# Patient Record
Sex: MALE | Race: WHITE | HISPANIC OR LATINO
[De-identification: names, ages, dates, MRNs, and addresses within clinical notes are randomized per-mention and may not be internally consistent; named-entity substitution may affect disease eponyms.]

---

## 2017-01-08 ENCOUNTER — MESSAGE (OUTPATIENT)
Age: 52
End: 2017-01-08

## 2017-01-11 ENCOUNTER — APPOINTMENT (OUTPATIENT)
Dept: PULMONOLOGY | Facility: CLINIC | Age: 52
End: 2017-01-11

## 2017-06-18 ENCOUNTER — EMERGENCY (EMERGENCY)
Facility: HOSPITAL | Age: 52
LOS: 1 days | Discharge: HOME | End: 2017-06-18
Attending: EMERGENCY MEDICINE
Payer: COMMERCIAL

## 2017-06-18 VITALS
DIASTOLIC BLOOD PRESSURE: 80 MMHG | SYSTOLIC BLOOD PRESSURE: 126 MMHG | OXYGEN SATURATION: 100 % | HEART RATE: 109 BPM | HEIGHT: 69 IN | WEIGHT: 214.95 LBS | TEMPERATURE: 99 F | RESPIRATION RATE: 24 BRPM

## 2017-06-18 VITALS
TEMPERATURE: 99 F | SYSTOLIC BLOOD PRESSURE: 130 MMHG | OXYGEN SATURATION: 97 % | RESPIRATION RATE: 20 BRPM | HEART RATE: 95 BPM | DIASTOLIC BLOOD PRESSURE: 83 MMHG

## 2017-06-18 DIAGNOSIS — G43.909 MIGRAINE, UNSPECIFIED, NOT INTRACTABLE, WITHOUT STATUS MIGRAINOSUS: ICD-10-CM

## 2017-06-18 DIAGNOSIS — M25.551 PAIN IN RIGHT HIP: ICD-10-CM

## 2017-06-18 DIAGNOSIS — M54.31 SCIATICA, RIGHT SIDE: ICD-10-CM

## 2017-06-18 DIAGNOSIS — E11.9 TYPE 2 DIABETES MELLITUS WITHOUT COMPLICATIONS: ICD-10-CM

## 2017-06-18 DIAGNOSIS — I10 ESSENTIAL (PRIMARY) HYPERTENSION: ICD-10-CM

## 2017-06-18 DIAGNOSIS — F17.210 NICOTINE DEPENDENCE, CIGARETTES, UNCOMPLICATED: ICD-10-CM

## 2017-06-18 PROCEDURE — 73502 X-RAY EXAM HIP UNI 2-3 VIEWS: CPT

## 2017-06-18 PROCEDURE — 99283 EMERGENCY DEPT VISIT LOW MDM: CPT | Mod: 25

## 2017-06-18 PROCEDURE — 96372 THER/PROPH/DIAG INJ SC/IM: CPT

## 2017-06-18 PROCEDURE — 99284 EMERGENCY DEPT VISIT MOD MDM: CPT

## 2017-06-18 PROCEDURE — 73502 X-RAY EXAM HIP UNI 2-3 VIEWS: CPT | Mod: 26,RT

## 2017-06-18 RX ORDER — ACETAMINOPHEN WITH CODEINE 300MG-30MG
1 TABLET ORAL
Qty: 20 | Refills: 0 | OUTPATIENT
Start: 2017-06-18 | End: 2017-06-23

## 2017-06-18 RX ORDER — KETOROLAC TROMETHAMINE 30 MG/ML
30 SYRINGE (ML) INJECTION ONCE
Qty: 0 | Refills: 0 | Status: DISCONTINUED | OUTPATIENT
Start: 2017-06-18 | End: 2017-06-18

## 2017-06-18 RX ORDER — ACETAMINOPHEN WITH CODEINE 300MG-30MG
1 TABLET ORAL ONCE
Qty: 0 | Refills: 0 | Status: DISCONTINUED | OUTPATIENT
Start: 2017-06-18 | End: 2017-06-18

## 2017-06-18 RX ORDER — IBUPROFEN 200 MG
1 TABLET ORAL
Qty: 20 | Refills: 0 | OUTPATIENT
Start: 2017-06-18 | End: 2017-06-23

## 2017-06-18 RX ADMIN — Medication 30 MILLIGRAM(S): at 16:20

## 2017-06-18 RX ADMIN — Medication 1 TABLET(S): at 16:19

## 2017-06-18 NOTE — ED PROVIDER NOTE - PROGRESS NOTE DETAILS
Pain is c/w patient's chronic R sided hip pain/sciatica. DC with cane  and pain meds, given copy of xray.

## 2017-06-18 NOTE — ED PROVIDER NOTE - OBJECTIVE STATEMENT
52 y/o male with significant PMHx NIDDM, HTN, presents to the ED c/o R hip pain radiating to R knee x today. Pt describes pain as aching and intermittent in nature, worsened with walking, causing limp. Pt reports he took Ibuprofen to mild relief. Pt denies heavy lifting, trauma or falls recently, only relates taking out garbage.  Pt denies swelling, dysuria, back pain, saddle anesthesia, numbness, weakness, tingling, N/V/D, Hx of abd surgeries, or any other complaints. NKDA Currently taking: Metformin. Smoker. 50 y/o male with significant PMHx NIDDM, HTN, presents to the ED c/o R abd/hip pain radiating to R knee x today. Pt describes pain as aching and intermittent in nature, worsened with walking, causing limp. Pt reports he took Ibuprofen to mild relief. Pt denies heavy lifting, trauma or falls recently, only relates taking out garbage.  Pt denies swelling, dysuria, back pain, saddle anesthesia, numbness, weakness, tingling, N/V/D, Hx of abd surgeries, or any other complaints. NKDA Currently taking: Metformin. Smoker. 50 y/o male with significant PMHx NIDDM, HTN, presents to the ED c/o R abd, R hip pain radiating to R knee x today. Pt describes pain as aching and intermittent in nature, worsened with walking, causing limp. Pt reports he took Ibuprofen to mild relief. Pt denies heavy lifting, trauma or falls recently, only relates taking out garbage.  Pt denies swelling, dysuria, back pain, saddle anesthesia, numbness, weakness, tingling, N/V/D, Hx of abd surgeries, or any other complaints. NKDA Currently taking: Metformin. Smoker. 50 y/o male with significant PMHx NIDDM, HTN, presents to the ED c/o R abd, R hip pain radiating to R knee x today. Pt describes pain as aching and intermittent in nature, worsened with walking, causing limp. Pt reports he took Ibuprofen to mild relief. Pt denies heavy lifting, trauma or falls recently, only relates taking out garbage. Pt has a known Hx of R sided sciatica and R hip pain. Pt denies swelling, dysuria, back pain, saddle anesthesia, numbness, weakness, tingling, N/V/D, Hx of abd surgeries, or any other complaints. NKDA Currently taking: Metformin. Smoker. 52 y/o male with significant PMHx NIDDM, HTN, R sided sciatica presents to the ED c/o R hip pain radiating to R knee x today. Pt describes pain as aching and intermittent in nature, worsened with walking, causing limp. Pt reports he took Ibuprofen to mild relief. Pt denies heavy lifting, trauma or falls recently, only relates taking out garbage. Pt has a known Hx of R sided sciatica and R hip pain. Pt denies swelling, dysuria, back pain, saddle anesthesia, numbness, weakness, tingling, N/V/D, Hx of abd surgeries, or any other complaints. NKDA Currently taking: Metformin. Smoker.

## 2017-06-18 NOTE — ED PROVIDER NOTE - CHPI ED SYMPTOMS NEG
no numbness/no tingling/no weakness/no swelling, dysuria, back pain, saddle anesthesia, numbness, weakness, tingling, N/V/D

## 2017-06-18 NOTE — ED PROVIDER NOTE - EXTREMITY EXAM
FROM of knees, pain on range of motion of R knee right lower extremity findings/FROM of knees, pain on range of motion of R hip

## 2017-06-18 NOTE — ED PROVIDER NOTE - MEDICAL DECISION MAKING DETAILS
Patient with known R sided sciatica c/o R hip pain radiating to the R knee, no new trauma, on exam with pain on ROM of the R hip but no redness, swelling, TTP. No leg swelling/pain. NV intact, will xray R hip, treat with pain meds, likely exacerbation of R hip sciatica Patient with known R sided sciatica c/o R hip pain radiating to the R knee, no new trauma, on exam with pain on ROM of the R hip but no redness, swelling, TTP. No leg swelling/pain. NV intact, no numbness/tingling/incontinence, will xray R hip, treat with pain meds, likely exacerbation of R hip sciatica.

## 2017-09-25 ENCOUNTER — OUTPATIENT (OUTPATIENT)
Dept: OUTPATIENT SERVICES | Facility: HOSPITAL | Age: 52
LOS: 1 days | Discharge: HOME | End: 2017-09-25
Payer: MEDICARE

## 2017-09-25 VITALS
HEART RATE: 74 BPM | HEIGHT: 66 IN | DIASTOLIC BLOOD PRESSURE: 81 MMHG | OXYGEN SATURATION: 99 % | WEIGHT: 220.24 LBS | RESPIRATION RATE: 18 BRPM | TEMPERATURE: 98 F | SYSTOLIC BLOOD PRESSURE: 144 MMHG

## 2017-09-25 DIAGNOSIS — I10 ESSENTIAL (PRIMARY) HYPERTENSION: ICD-10-CM

## 2017-09-25 DIAGNOSIS — M25.561 PAIN IN RIGHT KNEE: ICD-10-CM

## 2017-09-25 DIAGNOSIS — S83.221D PERIPHERAL TEAR OF MEDIAL MENISCUS, CURRENT INJURY, RIGHT KNEE, SUBSEQUENT ENCOUNTER: ICD-10-CM

## 2017-09-25 DIAGNOSIS — F17.200 NICOTINE DEPENDENCE, UNSPECIFIED, UNCOMPLICATED: ICD-10-CM

## 2017-09-25 DIAGNOSIS — E11.9 TYPE 2 DIABETES MELLITUS WITHOUT COMPLICATIONS: ICD-10-CM

## 2017-09-25 DIAGNOSIS — Z01.818 ENCOUNTER FOR OTHER PREPROCEDURAL EXAMINATION: ICD-10-CM

## 2017-09-25 LAB
ALBUMIN SERPL ELPH-MCNC: 3.7 G/DL — SIGNIFICANT CHANGE UP (ref 3.3–5)
ALP SERPL-CCNC: 100 U/L — SIGNIFICANT CHANGE UP (ref 40–120)
ALT FLD-CCNC: 36 U/L — SIGNIFICANT CHANGE UP (ref 12–78)
ANION GAP SERPL CALC-SCNC: 8 MMOL/L — SIGNIFICANT CHANGE UP (ref 5–17)
AST SERPL-CCNC: 17 U/L — SIGNIFICANT CHANGE UP (ref 15–37)
BILIRUB SERPL-MCNC: 0.3 MG/DL — SIGNIFICANT CHANGE UP (ref 0.2–1.2)
BUN SERPL-MCNC: 8 MG/DL — SIGNIFICANT CHANGE UP (ref 7–23)
CALCIUM SERPL-MCNC: 8.9 MG/DL — SIGNIFICANT CHANGE UP (ref 8.5–10.1)
CHLORIDE SERPL-SCNC: 104 MMOL/L — SIGNIFICANT CHANGE UP (ref 96–108)
CO2 SERPL-SCNC: 30 MMOL/L — SIGNIFICANT CHANGE UP (ref 22–31)
CREAT SERPL-MCNC: 0.74 MG/DL — SIGNIFICANT CHANGE UP (ref 0.5–1.3)
GLUCOSE SERPL-MCNC: 137 MG/DL — HIGH (ref 70–99)
HCT VFR BLD CALC: 43.3 % — SIGNIFICANT CHANGE UP (ref 39–50)
HGB BLD-MCNC: 14.8 G/DL — SIGNIFICANT CHANGE UP (ref 13–17)
MCHC RBC-ENTMCNC: 31.1 PG — SIGNIFICANT CHANGE UP (ref 27–34)
MCHC RBC-ENTMCNC: 34.2 GM/DL — SIGNIFICANT CHANGE UP (ref 32–36)
MCV RBC AUTO: 91 FL — SIGNIFICANT CHANGE UP (ref 80–100)
PLATELET # BLD AUTO: 189 K/UL — SIGNIFICANT CHANGE UP (ref 150–400)
POTASSIUM SERPL-MCNC: 3.2 MMOL/L — LOW (ref 3.5–5.3)
POTASSIUM SERPL-SCNC: 3.2 MMOL/L — LOW (ref 3.5–5.3)
PROT SERPL-MCNC: 7.7 GM/DL — SIGNIFICANT CHANGE UP (ref 6–8.3)
RBC # BLD: 4.76 M/UL — SIGNIFICANT CHANGE UP (ref 4.2–5.8)
RBC # FLD: 12.4 % — SIGNIFICANT CHANGE UP (ref 11–15)
SODIUM SERPL-SCNC: 142 MMOL/L — SIGNIFICANT CHANGE UP (ref 135–145)
WBC # BLD: 8.6 K/UL — SIGNIFICANT CHANGE UP (ref 3.8–10.5)
WBC # FLD AUTO: 8.6 K/UL — SIGNIFICANT CHANGE UP (ref 3.8–10.5)

## 2017-09-25 PROCEDURE — 71020: CPT | Mod: 26

## 2017-09-25 NOTE — H&P PST ADULT - FAMILY HISTORY
Grandparent  Still living? Unknown  Family history of essential hypertension, Age at diagnosis: Age Unknown

## 2017-09-25 NOTE — H&P PST ADULT - RS GEN PE MLT RESP DETAILS PC
no chest wall tenderness/no rhonchi/no wheezes/no rales/respirations non-labored/clear to auscultation bilaterally

## 2017-09-25 NOTE — H&P PST ADULT - HISTORY OF PRESENT ILLNESS
Patient is a 52 year old male with PMH DM, HTN, current everyday smoker who presents prior to right knee arthroscopy due to knee pain. Began suddenly in June of this year, tried PT with no relief. Takes ibuprofen at home with minimal relief. Pain worsened with laying supine and sitting for extended periods of time.     Patient saw his PCP about 1-2 weeks ago; he is aware of surgery. Patient is a 52 year old male with PMH DM, HTN, current everyday smoker who presents prior to right knee arthroscopy due to knee pain. States that pain began suddenly in June of this year, tried PT with no relief. Takes ibuprofen at home with minimal relief. Pain worsened when laying supine and sitting for extended periods of time.     Patient saw his PCP about 1-2 weeks ago; he is aware of surgery.

## 2017-10-30 ENCOUNTER — EMERGENCY (EMERGENCY)
Facility: HOSPITAL | Age: 52
LOS: 1 days | Discharge: LEFT BEFORE DISCHARGE | End: 2017-10-30
Admitting: EMERGENCY MEDICINE

## 2017-10-30 VITALS
SYSTOLIC BLOOD PRESSURE: 170 MMHG | DIASTOLIC BLOOD PRESSURE: 96 MMHG | RESPIRATION RATE: 18 BRPM | HEART RATE: 96 BPM | HEIGHT: 69 IN | WEIGHT: 218.92 LBS | OXYGEN SATURATION: 100 % | TEMPERATURE: 98 F

## 2017-10-30 NOTE — ED ADULT TRIAGE NOTE - CHIEF COMPLAINT QUOTE
patient with toothache on right side. hx of root canal on that tooth patient has not been able to see the dentist.

## 2017-10-30 NOTE — ED ADULT NURSE NOTE - OBJECTIVE STATEMENT
Pt is a 52 year old male who is complaining of pain in his right lower jaw and teeth. Pt reports the pain started last night suddenly. Pt reports he tried using Ibuprofen with no relief and decided to come to the ED.

## 2017-11-04 DIAGNOSIS — K08.89 OTHER SPECIFIED DISORDERS OF TEETH AND SUPPORTING STRUCTURES: ICD-10-CM

## 2018-01-08 ENCOUNTER — EMERGENCY (EMERGENCY)
Facility: HOSPITAL | Age: 53
LOS: 1 days | Discharge: HOME | End: 2018-01-08
Attending: EMERGENCY MEDICINE
Payer: COMMERCIAL

## 2018-01-08 VITALS
RESPIRATION RATE: 18 BRPM | TEMPERATURE: 99 F | DIASTOLIC BLOOD PRESSURE: 88 MMHG | HEIGHT: 69 IN | HEART RATE: 74 BPM | SYSTOLIC BLOOD PRESSURE: 141 MMHG | OXYGEN SATURATION: 98 % | WEIGHT: 218.92 LBS

## 2018-01-08 LAB
ALBUMIN SERPL ELPH-MCNC: 3.9 G/DL — SIGNIFICANT CHANGE UP (ref 3.5–5)
ALP SERPL-CCNC: 97 U/L — SIGNIFICANT CHANGE UP (ref 40–120)
ALT FLD-CCNC: 28 U/L DA — SIGNIFICANT CHANGE UP (ref 10–60)
ANION GAP SERPL CALC-SCNC: 8 MMOL/L — SIGNIFICANT CHANGE UP (ref 5–17)
APPEARANCE UR: CLEAR — SIGNIFICANT CHANGE UP
AST SERPL-CCNC: 15 U/L — SIGNIFICANT CHANGE UP (ref 10–40)
BACTERIA # UR AUTO: ABNORMAL /HPF
BASOPHILS # BLD AUTO: 0.1 K/UL — SIGNIFICANT CHANGE UP (ref 0–0.2)
BASOPHILS NFR BLD AUTO: 0.9 % — SIGNIFICANT CHANGE UP (ref 0–2)
BILIRUB SERPL-MCNC: 0.7 MG/DL — SIGNIFICANT CHANGE UP (ref 0.2–1.2)
BILIRUB UR-MCNC: ABNORMAL
BUN SERPL-MCNC: 12 MG/DL — SIGNIFICANT CHANGE UP (ref 7–18)
CALCIUM SERPL-MCNC: 8.9 MG/DL — SIGNIFICANT CHANGE UP (ref 8.4–10.5)
CHLORIDE SERPL-SCNC: 106 MMOL/L — SIGNIFICANT CHANGE UP (ref 96–108)
CO2 SERPL-SCNC: 24 MMOL/L — SIGNIFICANT CHANGE UP (ref 22–31)
COLOR SPEC: YELLOW — SIGNIFICANT CHANGE UP
COMMENT - URINE: SIGNIFICANT CHANGE UP
CREAT SERPL-MCNC: 0.8 MG/DL — SIGNIFICANT CHANGE UP (ref 0.5–1.3)
DIFF PNL FLD: ABNORMAL
EOSINOPHIL # BLD AUTO: 0.1 K/UL — SIGNIFICANT CHANGE UP (ref 0–0.5)
EOSINOPHIL NFR BLD AUTO: 0.7 % — SIGNIFICANT CHANGE UP (ref 0–6)
GLUCOSE SERPL-MCNC: 113 MG/DL — HIGH (ref 70–99)
GLUCOSE UR QL: NEGATIVE — SIGNIFICANT CHANGE UP
GRAN CASTS # UR COMP ASSIST: ABNORMAL /LPF
HCT VFR BLD CALC: 49.2 % — SIGNIFICANT CHANGE UP (ref 39–50)
HGB BLD-MCNC: 16.1 G/DL — SIGNIFICANT CHANGE UP (ref 13–17)
KETONES UR-MCNC: ABNORMAL
LEUKOCYTE ESTERASE UR-ACNC: ABNORMAL
LYMPHOCYTES # BLD AUTO: 2.7 K/UL — SIGNIFICANT CHANGE UP (ref 1–3.3)
LYMPHOCYTES # BLD AUTO: 30.5 % — SIGNIFICANT CHANGE UP (ref 13–44)
MCHC RBC-ENTMCNC: 30.5 PG — SIGNIFICANT CHANGE UP (ref 27–34)
MCHC RBC-ENTMCNC: 32.8 GM/DL — SIGNIFICANT CHANGE UP (ref 32–36)
MCV RBC AUTO: 92.9 FL — SIGNIFICANT CHANGE UP (ref 80–100)
MONOCYTES # BLD AUTO: 0.6 K/UL — SIGNIFICANT CHANGE UP (ref 0–0.9)
MONOCYTES NFR BLD AUTO: 6.7 % — SIGNIFICANT CHANGE UP (ref 2–14)
NEUTROPHILS # BLD AUTO: 5.4 K/UL — SIGNIFICANT CHANGE UP (ref 1.8–7.4)
NEUTROPHILS NFR BLD AUTO: 61.2 % — SIGNIFICANT CHANGE UP (ref 43–77)
NITRITE UR-MCNC: NEGATIVE — SIGNIFICANT CHANGE UP
PH UR: 6 — SIGNIFICANT CHANGE UP (ref 5–8)
PLATELET # BLD AUTO: 192 K/UL — SIGNIFICANT CHANGE UP (ref 150–400)
POTASSIUM SERPL-MCNC: 3.5 MMOL/L — SIGNIFICANT CHANGE UP (ref 3.5–5.3)
POTASSIUM SERPL-SCNC: 3.5 MMOL/L — SIGNIFICANT CHANGE UP (ref 3.5–5.3)
PROT SERPL-MCNC: 7.9 G/DL — SIGNIFICANT CHANGE UP (ref 6–8.3)
PROT UR-MCNC: 30 MG/DL
RBC # BLD: 5.29 M/UL — SIGNIFICANT CHANGE UP (ref 4.2–5.8)
RBC # FLD: 13.1 % — SIGNIFICANT CHANGE UP (ref 10.3–14.5)
RBC CASTS # UR COMP ASSIST: ABNORMAL /HPF (ref 0–2)
SODIUM SERPL-SCNC: 138 MMOL/L — SIGNIFICANT CHANGE UP (ref 135–145)
SP GR SPEC: 1.02 — SIGNIFICANT CHANGE UP (ref 1.01–1.02)
UROBILINOGEN FLD QL: 8
WBC # BLD: 8.8 K/UL — SIGNIFICANT CHANGE UP (ref 3.8–10.5)
WBC # FLD AUTO: 8.8 K/UL — SIGNIFICANT CHANGE UP (ref 3.8–10.5)
WBC UR QL: ABNORMAL /HPF (ref 0–5)

## 2018-01-08 PROCEDURE — 85027 COMPLETE CBC AUTOMATED: CPT

## 2018-01-08 PROCEDURE — 99284 EMERGENCY DEPT VISIT MOD MDM: CPT

## 2018-01-08 PROCEDURE — 74176 CT ABD & PELVIS W/O CONTRAST: CPT

## 2018-01-08 PROCEDURE — 80053 COMPREHEN METABOLIC PANEL: CPT

## 2018-01-08 PROCEDURE — 81001 URINALYSIS AUTO W/SCOPE: CPT

## 2018-01-08 PROCEDURE — 96374 THER/PROPH/DIAG INJ IV PUSH: CPT

## 2018-01-08 PROCEDURE — 71046 X-RAY EXAM CHEST 2 VIEWS: CPT | Mod: 26

## 2018-01-08 PROCEDURE — 71046 X-RAY EXAM CHEST 2 VIEWS: CPT

## 2018-01-08 PROCEDURE — 96375 TX/PRO/DX INJ NEW DRUG ADDON: CPT

## 2018-01-08 PROCEDURE — 74176 CT ABD & PELVIS W/O CONTRAST: CPT | Mod: 26

## 2018-01-08 PROCEDURE — 99284 EMERGENCY DEPT VISIT MOD MDM: CPT | Mod: 25

## 2018-01-08 RX ORDER — IBUPROFEN 200 MG
1 TABLET ORAL
Qty: 20 | Refills: 0 | OUTPATIENT
Start: 2018-01-08 | End: 2018-02-06

## 2018-01-08 RX ORDER — KETOROLAC TROMETHAMINE 30 MG/ML
15 SYRINGE (ML) INJECTION ONCE
Qty: 0 | Refills: 0 | Status: DISCONTINUED | OUTPATIENT
Start: 2018-01-08 | End: 2018-01-08

## 2018-01-08 RX ORDER — CEFTRIAXONE 500 MG/1
1 INJECTION, POWDER, FOR SOLUTION INTRAMUSCULAR; INTRAVENOUS ONCE
Qty: 0 | Refills: 0 | Status: COMPLETED | OUTPATIENT
Start: 2018-01-08 | End: 2018-01-08

## 2018-01-08 RX ORDER — SODIUM CHLORIDE 9 MG/ML
3 INJECTION INTRAMUSCULAR; INTRAVENOUS; SUBCUTANEOUS ONCE
Qty: 0 | Refills: 0 | Status: COMPLETED | OUTPATIENT
Start: 2018-01-08 | End: 2018-01-08

## 2018-01-08 RX ORDER — CEFUROXIME AXETIL 250 MG
1 TABLET ORAL
Qty: 20 | Refills: 0 | OUTPATIENT
Start: 2018-01-08 | End: 2018-01-17

## 2018-01-08 RX ADMIN — Medication 15 MILLIGRAM(S): at 12:39

## 2018-01-08 RX ADMIN — SODIUM CHLORIDE 3 MILLILITER(S): 9 INJECTION INTRAMUSCULAR; INTRAVENOUS; SUBCUTANEOUS at 12:39

## 2018-01-08 RX ADMIN — CEFTRIAXONE 100 GRAM(S): 500 INJECTION, POWDER, FOR SOLUTION INTRAMUSCULAR; INTRAVENOUS at 17:09

## 2018-01-08 NOTE — ED PROVIDER NOTE - OBJECTIVE STATEMENT
51 y/o M w/ Hx of HTN, kidney stones c/o L sided back pain x 4 hours w/ nausea. Pt denies fever, chills, cough, hematouria and any other complaints. NKDA. 53 y/o M w/ Hx of HTN, kidney stones c/o L sided back pain x 4 hours w/ nausea. Pt denies cp/sob/dizziness. No fever, chills, cough, hematuria and any other complaints. NKDA.

## 2018-05-19 ENCOUNTER — EMERGENCY (EMERGENCY)
Facility: HOSPITAL | Age: 53
LOS: 1 days | Discharge: HOME | End: 2018-05-19
Attending: EMERGENCY MEDICINE
Payer: COMMERCIAL

## 2018-05-19 VITALS
SYSTOLIC BLOOD PRESSURE: 158 MMHG | DIASTOLIC BLOOD PRESSURE: 91 MMHG | RESPIRATION RATE: 20 BRPM | OXYGEN SATURATION: 96 % | HEIGHT: 69 IN | HEART RATE: 86 BPM | TEMPERATURE: 98 F | WEIGHT: 218.92 LBS

## 2018-05-19 LAB
APPEARANCE UR: ABNORMAL
BILIRUB UR-MCNC: NEGATIVE — SIGNIFICANT CHANGE UP
COLOR SPEC: YELLOW — SIGNIFICANT CHANGE UP
DIFF PNL FLD: ABNORMAL
GLUCOSE UR QL: NEGATIVE — SIGNIFICANT CHANGE UP
KETONES UR-MCNC: ABNORMAL
LEUKOCYTE ESTERASE UR-ACNC: ABNORMAL
NITRITE UR-MCNC: NEGATIVE — SIGNIFICANT CHANGE UP
PH UR: 6 — SIGNIFICANT CHANGE UP (ref 5–8)
PROT UR-MCNC: 30 MG/DL
SP GR SPEC: 1.02 — SIGNIFICANT CHANGE UP (ref 1.01–1.02)
UROBILINOGEN FLD QL: NEGATIVE — SIGNIFICANT CHANGE UP

## 2018-05-19 PROCEDURE — 99285 EMERGENCY DEPT VISIT HI MDM: CPT

## 2018-05-19 PROCEDURE — 99284 EMERGENCY DEPT VISIT MOD MDM: CPT | Mod: 25

## 2018-05-19 PROCEDURE — 74176 CT ABD & PELVIS W/O CONTRAST: CPT

## 2018-05-19 PROCEDURE — 74176 CT ABD & PELVIS W/O CONTRAST: CPT | Mod: 26

## 2018-05-19 PROCEDURE — 81001 URINALYSIS AUTO W/SCOPE: CPT

## 2018-05-19 RX ORDER — CEFUROXIME AXETIL 250 MG
1 TABLET ORAL
Qty: 20 | Refills: 0 | OUTPATIENT
Start: 2018-05-19 | End: 2018-05-28

## 2018-05-19 NOTE — ED PROVIDER NOTE - MUSCULOSKELETAL MINIMAL EXAM
L subscapular tenderness to palpation, movement of L arm aggravates Sx, no flank tenderness to palpation

## 2018-05-19 NOTE — ED PROVIDER NOTE - MEDICAL DECISION MAKING DETAILS
Pt with L upper back pain x today. Will get labs, UA. UA returned nml, will d/c home with Motrin. Pt with L upper back pain x today. Will get labs, UA. UA reveals Urinary tract infection. Radiologist reports 2cm lesion in left kidney, recommends MRI to rule out malignancy. will d/c home with Motrin.

## 2018-05-19 NOTE — ED PROVIDER NOTE - PROGRESS NOTE DETAILS
ISELA: pt well appearing, comfortable, UA suggestive UTI and possible stone, will send Ceftin await CT and follow up with urology.

## 2018-05-19 NOTE — ED PROVIDER NOTE - OBJECTIVE STATEMENT
51 y/o male with  PMHx of DM, HTN presents to the ED c/o L upper back pain x today. Pt notes he took Ibuprofen 800mg to relief. Pt concerned with Sx due to Hx of renal stones and infection, prompting his visit to the ED. Pt denies fever, chills, or any other complaints. NKDA.

## 2018-05-19 NOTE — ED PROVIDER NOTE - CARE PLAN
Principal Discharge DX:	Back pain  Secondary Diagnosis:	UTI (urinary tract infection) Principal Discharge DX:	Back pain  Secondary Diagnosis:	UTI (urinary tract infection)  Secondary Diagnosis:	Renal lesion

## 2018-08-05 ENCOUNTER — EMERGENCY (EMERGENCY)
Facility: HOSPITAL | Age: 53
LOS: 1 days | Discharge: HOME | End: 2018-08-05
Attending: EMERGENCY MEDICINE | Admitting: EMERGENCY MEDICINE
Payer: MEDICARE

## 2018-08-05 VITALS
TEMPERATURE: 98 F | DIASTOLIC BLOOD PRESSURE: 89 MMHG | RESPIRATION RATE: 16 BRPM | OXYGEN SATURATION: 99 % | HEART RATE: 64 BPM | SYSTOLIC BLOOD PRESSURE: 156 MMHG

## 2018-08-05 VITALS
TEMPERATURE: 98 F | SYSTOLIC BLOOD PRESSURE: 169 MMHG | RESPIRATION RATE: 16 BRPM | OXYGEN SATURATION: 100 % | HEART RATE: 83 BPM | DIASTOLIC BLOOD PRESSURE: 97 MMHG

## 2018-08-05 PROBLEM — N15.9 RENAL TUBULO-INTERSTITIAL DISEASE, UNSPECIFIED: Chronic | Status: ACTIVE | Noted: 2018-05-19

## 2018-08-05 PROBLEM — N20.0 CALCULUS OF KIDNEY: Chronic | Status: ACTIVE | Noted: 2018-05-19

## 2018-08-05 LAB
APPEARANCE UR: SIGNIFICANT CHANGE UP
BACTERIA # UR AUTO: HIGH
BASOPHILS # BLD AUTO: 0.02 K/UL — SIGNIFICANT CHANGE UP (ref 0–0.2)
BASOPHILS NFR BLD AUTO: 0.2 % — SIGNIFICANT CHANGE UP (ref 0–2)
BILIRUB UR-MCNC: NEGATIVE — SIGNIFICANT CHANGE UP
BLOOD UR QL VISUAL: NEGATIVE — SIGNIFICANT CHANGE UP
BUN SERPL-MCNC: 11 MG/DL — SIGNIFICANT CHANGE UP (ref 7–23)
CALCIUM SERPL-MCNC: 9.7 MG/DL — SIGNIFICANT CHANGE UP (ref 8.4–10.5)
CHLORIDE SERPL-SCNC: 99 MMOL/L — SIGNIFICANT CHANGE UP (ref 98–107)
CO2 SERPL-SCNC: 29 MMOL/L — SIGNIFICANT CHANGE UP (ref 22–31)
COLOR SPEC: YELLOW — SIGNIFICANT CHANGE UP
CREAT SERPL-MCNC: 0.7 MG/DL — SIGNIFICANT CHANGE UP (ref 0.5–1.3)
EOSINOPHIL # BLD AUTO: 0.08 K/UL — SIGNIFICANT CHANGE UP (ref 0–0.5)
EOSINOPHIL NFR BLD AUTO: 0.9 % — SIGNIFICANT CHANGE UP (ref 0–6)
GLUCOSE SERPL-MCNC: 133 MG/DL — HIGH (ref 70–99)
GLUCOSE UR-MCNC: NEGATIVE — SIGNIFICANT CHANGE UP
HCT VFR BLD CALC: 42.8 % — SIGNIFICANT CHANGE UP (ref 39–50)
HGB BLD-MCNC: 14.8 G/DL — SIGNIFICANT CHANGE UP (ref 13–17)
IMM GRANULOCYTES # BLD AUTO: 0.04 # — SIGNIFICANT CHANGE UP
IMM GRANULOCYTES NFR BLD AUTO: 0.4 % — SIGNIFICANT CHANGE UP (ref 0–1.5)
KETONES UR-MCNC: NEGATIVE — SIGNIFICANT CHANGE UP
LEUKOCYTE ESTERASE UR-ACNC: HIGH
LYMPHOCYTES # BLD AUTO: 2.3 K/UL — SIGNIFICANT CHANGE UP (ref 1–3.3)
LYMPHOCYTES # BLD AUTO: 24.8 % — SIGNIFICANT CHANGE UP (ref 13–44)
MCHC RBC-ENTMCNC: 30.6 PG — SIGNIFICANT CHANGE UP (ref 27–34)
MCHC RBC-ENTMCNC: 34.6 % — SIGNIFICANT CHANGE UP (ref 32–36)
MCV RBC AUTO: 88.6 FL — SIGNIFICANT CHANGE UP (ref 80–100)
MONOCYTES # BLD AUTO: 0.81 K/UL — SIGNIFICANT CHANGE UP (ref 0–0.9)
MONOCYTES NFR BLD AUTO: 8.7 % — SIGNIFICANT CHANGE UP (ref 2–14)
MUCOUS THREADS # UR AUTO: SIGNIFICANT CHANGE UP
NEUTROPHILS # BLD AUTO: 6.01 K/UL — SIGNIFICANT CHANGE UP (ref 1.8–7.4)
NEUTROPHILS NFR BLD AUTO: 65 % — SIGNIFICANT CHANGE UP (ref 43–77)
NITRITE UR-MCNC: NEGATIVE — SIGNIFICANT CHANGE UP
NRBC # FLD: 0 — SIGNIFICANT CHANGE UP
PH UR: 7 — SIGNIFICANT CHANGE UP (ref 4.6–8)
PLATELET # BLD AUTO: 167 K/UL — SIGNIFICANT CHANGE UP (ref 150–400)
PMV BLD: 12.5 FL — SIGNIFICANT CHANGE UP (ref 7–13)
POTASSIUM SERPL-MCNC: 3.7 MMOL/L — SIGNIFICANT CHANGE UP (ref 3.5–5.3)
POTASSIUM SERPL-SCNC: 3.7 MMOL/L — SIGNIFICANT CHANGE UP (ref 3.5–5.3)
PROT UR-MCNC: 20 MG/DL — SIGNIFICANT CHANGE UP
RBC # BLD: 4.83 M/UL — SIGNIFICANT CHANGE UP (ref 4.2–5.8)
RBC # FLD: 12.7 % — SIGNIFICANT CHANGE UP (ref 10.3–14.5)
RBC CASTS # UR COMP ASSIST: SIGNIFICANT CHANGE UP (ref 0–?)
SODIUM SERPL-SCNC: 141 MMOL/L — SIGNIFICANT CHANGE UP (ref 135–145)
SP GR SPEC: 1.02 — SIGNIFICANT CHANGE UP (ref 1–1.04)
UROBILINOGEN FLD QL: NORMAL MG/DL — SIGNIFICANT CHANGE UP
WBC # BLD: 9.26 K/UL — SIGNIFICANT CHANGE UP (ref 3.8–10.5)
WBC # FLD AUTO: 9.26 K/UL — SIGNIFICANT CHANGE UP (ref 3.8–10.5)
WBC UR QL: SIGNIFICANT CHANGE UP (ref 0–?)

## 2018-08-05 PROCEDURE — 99284 EMERGENCY DEPT VISIT MOD MDM: CPT

## 2018-08-05 PROCEDURE — 76770 US EXAM ABDO BACK WALL COMP: CPT | Mod: 26

## 2018-08-05 RX ORDER — SODIUM CHLORIDE 9 MG/ML
1000 INJECTION INTRAMUSCULAR; INTRAVENOUS; SUBCUTANEOUS ONCE
Qty: 0 | Refills: 0 | Status: COMPLETED | OUTPATIENT
Start: 2018-08-05 | End: 2018-08-05

## 2018-08-05 RX ORDER — PHENAZOPYRIDINE HCL 100 MG
1 TABLET ORAL
Qty: 9 | Refills: 0 | OUTPATIENT
Start: 2018-08-05 | End: 2018-08-07

## 2018-08-05 RX ORDER — KETOROLAC TROMETHAMINE 30 MG/ML
15 SYRINGE (ML) INJECTION ONCE
Qty: 0 | Refills: 0 | Status: DISCONTINUED | OUTPATIENT
Start: 2018-08-05 | End: 2018-08-05

## 2018-08-05 RX ORDER — CEFUROXIME AXETIL 250 MG
1 TABLET ORAL
Qty: 20 | Refills: 0 | OUTPATIENT
Start: 2018-08-05 | End: 2018-08-14

## 2018-08-05 RX ORDER — CEPHALEXIN 500 MG
500 CAPSULE ORAL ONCE
Qty: 0 | Refills: 0 | Status: COMPLETED | OUTPATIENT
Start: 2018-08-05 | End: 2018-08-05

## 2018-08-05 RX ADMIN — Medication 500 MILLIGRAM(S): at 18:41

## 2018-08-05 RX ADMIN — SODIUM CHLORIDE 2000 MILLILITER(S): 9 INJECTION INTRAMUSCULAR; INTRAVENOUS; SUBCUTANEOUS at 12:43

## 2018-08-05 RX ADMIN — Medication 15 MILLIGRAM(S): at 12:43

## 2018-08-05 NOTE — ED ADULT NURSE NOTE - OBJECTIVE STATEMENT
received pt to intake room 3 for evaluation of left flank pain, intermittent nausea and dysuria x 1 day. denies any additional symptoms. pt presents awake a&ox4, denies dizziness or ha. skin warm, dry, appropriate for race. respirations even, unlabored. denies cp or sob. abdomen soft nontender nondistended. + left flank pain and dysuria. denies foul odor or different color/blood in urine. denies active n/v/d/fevers or chills. ivl placed. bloods drawn and sent. NS 0.9% bolus infusing. pt medicated as ordered. will re-assess. a

## 2018-08-05 NOTE — ED PROVIDER NOTE - OBJECTIVE STATEMENT
53 y/o m with pmhx of dm, htn, renal colic, uti, pyelo p/w left sided flank pain, mild polyuria neg dysuria. denies hematuria. states feels more similar to previous episode of pyelo. pt well appearing, sitting comfortably in bed. denies constitutional signs of infection, including no neck pain, nuchal rigidity, chills, fever, rigors. neg n/v/d, abd pain. neg groin pain. no purulent discharge from penis.

## 2018-08-05 NOTE — ED ADULT NURSE NOTE - NSIMPLEMENTINTERV_GEN_ALL_ED
Implemented All Universal Safety Interventions:  [place] to call system. Call bell, personal items and telephone within reach. Instruct patient to call for assistance. Room bathroom lighting operational. Non-slip footwear when patient is off stretcher. Physically safe environment: no spills, clutter or unnecessary equipment. Stretcher in lowest position, wheels locked, appropriate side rails in place.

## 2018-08-05 NOTE — ED PROVIDER NOTE - PROGRESS NOTE DETAILS
Harley:  patient signed out to me pending UA, appears to have urinary infection, will treat with keflex and instructed to follow up with his physician, given return precautions. consuelo: pt neg us for hydro, pending ua, signed out to dr bliss. pt with no active complaints at this point.

## 2018-10-31 ENCOUNTER — OUTPATIENT (OUTPATIENT)
Dept: OUTPATIENT SERVICES | Facility: HOSPITAL | Age: 53
LOS: 1 days | End: 2018-10-31
Payer: MEDICARE

## 2018-10-31 VITALS
RESPIRATION RATE: 19 BRPM | OXYGEN SATURATION: 96 % | HEIGHT: 69 IN | WEIGHT: 244.93 LBS | TEMPERATURE: 98 F | HEART RATE: 80 BPM | SYSTOLIC BLOOD PRESSURE: 151 MMHG | DIASTOLIC BLOOD PRESSURE: 86 MMHG

## 2018-10-31 DIAGNOSIS — Z98.890 OTHER SPECIFIED POSTPROCEDURAL STATES: Chronic | ICD-10-CM

## 2018-10-31 DIAGNOSIS — N20.0 CALCULUS OF KIDNEY: ICD-10-CM

## 2018-10-31 DIAGNOSIS — Z01.818 ENCOUNTER FOR OTHER PREPROCEDURAL EXAMINATION: ICD-10-CM

## 2018-10-31 PROCEDURE — G0463: CPT

## 2018-10-31 RX ORDER — SODIUM CHLORIDE 9 MG/ML
3 INJECTION INTRAMUSCULAR; INTRAVENOUS; SUBCUTANEOUS EVERY 8 HOURS
Qty: 0 | Refills: 0 | Status: DISCONTINUED | OUTPATIENT
Start: 2018-11-13 | End: 2018-11-21

## 2018-10-31 NOTE — H&P PST ADULT - TOBACCO USE
2000 TRANSFER - IN REPORT:    Verbal report received from Mary Maria (name) on Jericho Sparks  being received from Cuong (unit) for routine progression of care      Report consisted of patients Situation, Background, Assessment and   Recommendations(SBAR). Information from the following report(s) SBAR, Kardex, Procedure Summary, Intake/Output, MAR, Recent Results and Cardiac Rhythm NSR / Ayleen Ada was reviewed with the receiving nurse. Opportunity for questions and clarification was provided. Assessment completed upon patients arrival to unit and care assumed. 2120 BG 60. Given 4 oz juice and 12.5 g dextrose 50. Repeat .  2130 Instilled Cathflo into PICC for sluggish blood return  2300 Removed Cathflo. Blood return still minimal, unable to draw back 10 cc.  0330 Checked blood sugar while drawing morning labs. Critically low below 45. Pt not symptomatic. Given 25 g dextrose 50, repeat .    0730 Bedside shift change report given to Oneida Thompson (oncoming nurse) by Cuong (offgoing nurse). Report included the following information SBAR, Kardex, Procedure Summary, Intake/Output, MAR, Recent Results and Cardiac Rhythm NSR/ST. Never smoker

## 2018-10-31 NOTE — H&P PST ADULT - NSANTHOSAYNRD_GEN_A_CORE
No. SALLIE screening performed.  STOP BANG Legend: 0-2 = LOW Risk; 3-4 = INTERMEDIATE Risk; 5-8 = HIGH Risk

## 2018-10-31 NOTE — H&P PST ADULT - PMH
Diabetes    Hyperlipidemia    Hypertension    Migraines    Nephrolithiasis    Renal infection    Renal stones    Tear of meniscus of knee

## 2018-10-31 NOTE — H&P PST ADULT - HISTORY OF PRESENT ILLNESS
53year old male with pmhx of T2DM, hypertension, hyperlipidemia, nephrolithiasis, migraines, tear of meniscus and renal stones presents today for presurgical testing for scheduled left extracorporeal shockwave lithotripsy on 11/31/18

## 2018-11-12 ENCOUNTER — TRANSCRIPTION ENCOUNTER (OUTPATIENT)
Age: 53
End: 2018-11-12

## 2018-11-13 ENCOUNTER — OUTPATIENT (OUTPATIENT)
Dept: OUTPATIENT SERVICES | Facility: HOSPITAL | Age: 53
LOS: 1 days | End: 2018-11-13
Payer: MEDICARE

## 2018-11-13 VITALS
RESPIRATION RATE: 14 BRPM | TEMPERATURE: 98 F | OXYGEN SATURATION: 98 % | SYSTOLIC BLOOD PRESSURE: 126 MMHG | DIASTOLIC BLOOD PRESSURE: 64 MMHG | HEART RATE: 59 BPM

## 2018-11-13 VITALS
SYSTOLIC BLOOD PRESSURE: 134 MMHG | WEIGHT: 244.93 LBS | RESPIRATION RATE: 18 BRPM | HEIGHT: 69 IN | OXYGEN SATURATION: 96 % | HEART RATE: 78 BPM | TEMPERATURE: 98 F | DIASTOLIC BLOOD PRESSURE: 87 MMHG

## 2018-11-13 DIAGNOSIS — Z98.890 OTHER SPECIFIED POSTPROCEDURAL STATES: Chronic | ICD-10-CM

## 2018-11-13 DIAGNOSIS — N20.0 CALCULUS OF KIDNEY: ICD-10-CM

## 2018-11-13 DIAGNOSIS — Z01.818 ENCOUNTER FOR OTHER PREPROCEDURAL EXAMINATION: ICD-10-CM

## 2018-11-13 LAB
GLUCOSE BLDC GLUCOMTR-MCNC: 142 MG/DL — HIGH (ref 70–99)
GLUCOSE BLDC GLUCOMTR-MCNC: 160 MG/DL — HIGH (ref 70–99)

## 2018-11-13 PROCEDURE — 82962 GLUCOSE BLOOD TEST: CPT

## 2018-11-13 PROCEDURE — 50590 FRAGMENTING OF KIDNEY STONE: CPT

## 2018-11-13 RX ORDER — HYDROMORPHONE HYDROCHLORIDE 2 MG/ML
0.5 INJECTION INTRAMUSCULAR; INTRAVENOUS; SUBCUTANEOUS
Qty: 0 | Refills: 0 | Status: DISCONTINUED | OUTPATIENT
Start: 2018-11-13 | End: 2018-11-13

## 2018-11-13 RX ORDER — ONDANSETRON 8 MG/1
4 TABLET, FILM COATED ORAL EVERY 6 HOURS
Qty: 0 | Refills: 0 | Status: DISCONTINUED | OUTPATIENT
Start: 2018-11-13 | End: 2018-11-21

## 2018-11-13 RX ORDER — OXYCODONE AND ACETAMINOPHEN 5; 325 MG/1; MG/1
1 TABLET ORAL EVERY 6 HOURS
Qty: 0 | Refills: 0 | Status: DISCONTINUED | OUTPATIENT
Start: 2018-11-13 | End: 2018-11-13

## 2018-11-13 RX ADMIN — SODIUM CHLORIDE 3 MILLILITER(S): 9 INJECTION INTRAMUSCULAR; INTRAVENOUS; SUBCUTANEOUS at 07:49

## 2018-11-13 NOTE — ASU DISCHARGE PLAN (ADULT/PEDIATRIC). - MEDICATION SUMMARY - MEDICATIONS TO TAKE
I will START or STAY ON the medications listed below when I get home from the hospital:    metFORMIN 1000 mg oral tablet, extended release  -- 1 tab(s) by mouth 2 times a day  -- Indication: For as prescribed    Januvia 25 mg oral tablet  -- 1 tab(s) by mouth once a day  -- Indication: For as prescribed    Prandin 1 mg oral tablet  -- 1 tab(s) by mouth 3 times a day (before meals)  -- Indication: For as prescribed    Crestor 10 mg oral tablet  -- 1 tab(s) by mouth once a day (at bedtime)  -- Indication: For as prescribed    Toprol-XL 25 mg oral tablet, extended release  -- 1 tab(s) by mouth once a day  -- Indication: For as prescribed    Norvasc 10 mg oral tablet  -- 1 tab(s) by mouth once a day  -- Indication: For as prescribed

## 2018-11-30 NOTE — ED ADULT NURSE NOTE - TIMING
----- Message from Keli Muniz sent at 11/30/2018 11:53 AM CST -----  Contact: pt   Name of Who is Calling: YARI MUELLER [[id number]]       What is the request in detail: patient is returning a call in regards to insertion of IUD....Please contact to further discuss and advise.       Can the clinic reply by [facility]: NO       What Number to Call Back [facility] not [facility]: [phone] LEAVE MS IF NO ANSWER     sudden onset

## 2019-04-19 ENCOUNTER — TRANSCRIPTION ENCOUNTER (OUTPATIENT)
Age: 54
End: 2019-04-19

## 2019-05-31 NOTE — ED PROVIDER NOTE - CROS ED ENMT ALL NEG
Left msg for pt to return my call to the clinic to discuss MD recommendations.    
Left msg for pt to return my call to the clinic to discuss MD recommendations.    
Left msg on pt home phone to return my call to discuss medication regimen as previously noted.     Last Note 3.19.19     Glucometer download  Episode of hypoglycemia - None  Current Regimen (insulin medications) Toujeo 115 units BID   Novolog 40 units QAC plus 5:50>150     Self Glucose Testing  Glucometer date and time were incorrect. Reset by MA after download  BG range  mg/dL since last office visit  No hyperglycemia and no episodes of BG >400mg/dL.  BG remain variable due to lifestyle.     Plan: Continue present management until f/u in summer 2019.  call sooner with any questions, concerns, or BS <70 or >300 twice in a week.      Left message to call office         Hospital admission/ discharge instructions 5.5.19  MEDICATIONS:  1.  NovoLog Regular Insulin 20 units 3 times a day before meals.    2.  Toujeo.  She was taking 70 units nightly before admission.  I would reduce that to 30 units at this time and adjust accordingly.      
Letter sent to pt home address.  
Patient name: Lela Lowry   Person calling: Lela garcía   Phone Number: [phone]   Best Availability:   Fax Number:   Facility name:   Verbal disclosure:   Permission to leave detailed message: No  Who person saw last:   Name of pharmacy:    Name of medication:    needed: No    Detailed reason for call:  Patient calling. She is scheduled 8/1/19 for Dr. Dutta and put on the wait list to be seen for hospital follow up but insulin was changed in the hospital and she would like to go over this. Please call her @ [phone] when able.   
Patient returned call, via scheduling line.    Please call back at: [phone]  
negative...

## 2019-09-15 ENCOUNTER — EMERGENCY (EMERGENCY)
Facility: HOSPITAL | Age: 54
LOS: 1 days | Discharge: HOME | End: 2019-09-15
Attending: EMERGENCY MEDICINE
Payer: MEDICARE

## 2019-09-15 VITALS
OXYGEN SATURATION: 96 % | HEART RATE: 79 BPM | DIASTOLIC BLOOD PRESSURE: 82 MMHG | SYSTOLIC BLOOD PRESSURE: 134 MMHG | RESPIRATION RATE: 16 BRPM | HEIGHT: 69 IN | WEIGHT: 218.92 LBS | TEMPERATURE: 98 F

## 2019-09-15 DIAGNOSIS — Z98.890 OTHER SPECIFIED POSTPROCEDURAL STATES: Chronic | ICD-10-CM

## 2019-09-15 PROBLEM — S83.209A UNSPECIFIED TEAR OF UNSPECIFIED MENISCUS, CURRENT INJURY, UNSPECIFIED KNEE, INITIAL ENCOUNTER: Chronic | Status: ACTIVE | Noted: 2018-10-31

## 2019-09-15 PROBLEM — E78.5 HYPERLIPIDEMIA, UNSPECIFIED: Chronic | Status: ACTIVE | Noted: 2018-10-31

## 2019-09-15 PROCEDURE — 73130 X-RAY EXAM OF HAND: CPT | Mod: 26,RT

## 2019-09-15 PROCEDURE — 99284 EMERGENCY DEPT VISIT MOD MDM: CPT

## 2019-09-15 PROCEDURE — 90715 TDAP VACCINE 7 YRS/> IM: CPT

## 2019-09-15 PROCEDURE — 90471 IMMUNIZATION ADMIN: CPT

## 2019-09-15 PROCEDURE — 99283 EMERGENCY DEPT VISIT LOW MDM: CPT | Mod: 25

## 2019-09-15 PROCEDURE — 73130 X-RAY EXAM OF HAND: CPT

## 2019-09-15 RX ORDER — ACETAMINOPHEN 500 MG
2 TABLET ORAL
Qty: 24 | Refills: 0
Start: 2019-09-15 | End: 2019-09-17

## 2019-09-15 RX ORDER — TETANUS TOXOID, REDUCED DIPHTHERIA TOXOID AND ACELLULAR PERTUSSIS VACCINE, ADSORBED 5; 2.5; 8; 8; 2.5 [IU]/.5ML; [IU]/.5ML; UG/.5ML; UG/.5ML; UG/.5ML
0.5 SUSPENSION INTRAMUSCULAR ONCE
Refills: 0 | Status: COMPLETED | OUTPATIENT
Start: 2019-09-15 | End: 2019-09-15

## 2019-09-15 RX ORDER — CEPHALEXIN 500 MG
1 CAPSULE ORAL
Qty: 10 | Refills: 0
Start: 2019-09-15 | End: 2019-09-19

## 2019-09-15 RX ORDER — IBUPROFEN 200 MG
600 TABLET ORAL ONCE
Refills: 0 | Status: COMPLETED | OUTPATIENT
Start: 2019-09-15 | End: 2019-09-15

## 2019-09-15 RX ORDER — BACITRACIN ZINC 500 UNIT/G
1 OINTMENT IN PACKET (EA) TOPICAL ONCE
Refills: 0 | Status: COMPLETED | OUTPATIENT
Start: 2019-09-15 | End: 2019-09-15

## 2019-09-15 RX ADMIN — Medication 600 MILLIGRAM(S): at 13:32

## 2019-09-15 RX ADMIN — TETANUS TOXOID, REDUCED DIPHTHERIA TOXOID AND ACELLULAR PERTUSSIS VACCINE, ADSORBED 0.5 MILLILITER(S): 5; 2.5; 8; 8; 2.5 SUSPENSION INTRAMUSCULAR at 13:32

## 2019-09-15 RX ADMIN — Medication 600 MILLIGRAM(S): at 14:00

## 2019-09-15 RX ADMIN — Medication 1 APPLICATION(S): at 13:34

## 2019-09-15 NOTE — ED ADULT NURSE NOTE - OBJECTIVE STATEMENT
States a door slammed to right thumb yesterday, c/o pain to right humb and laceration to right palm. Laceration to right palm irrigated with NS and soaked with betadine and NS then bacitracin ointment and dsd applied.

## 2019-09-15 NOTE — ED PROVIDER NOTE - CLINICAL SUMMARY MEDICAL DECISION MAKING FREE TEXT BOX
Neurovascularly intact. No indication for lac repair. Will wash and bacitracin dressing. Xray r/o fracture, IBU, adacel, re-assess.

## 2019-09-15 NOTE — ED PROVIDER NOTE - PROGRESS NOTE DETAILS
Xray negative. Tetanus immunization updated. Given delay in cleaning wound last night and history of DM will cover with prophylactic Keflex. Will need to follow up with PMD in 2-3 days. Pt is well appearing walking with steady gait, stable for discharge and follow up without fail with medical doctor. I had a detailed discussion with the patient and/or guardian regarding the historical points, exam findings, and any diagnostic results supporting the discharge diagnosis. Pt educated on care and need for follow up. Strict return instructions and red flag signs and symptoms discussed with patient. Questions answered. Pt shows understanding of discharge information and agrees to follow.

## 2019-09-15 NOTE — ED PROVIDER NOTE - PATIENT PORTAL LINK FT
You can access the FollowMyHealth Patient Portal offered by [facility] by registering at the following website: [email or web address]. By joining [facility]’s FollowMyHealth portal, you will also be able to view your health information using other applications (apps) compatible with our system.

## 2019-09-15 NOTE — ED PROVIDER NOTE - OBJECTIVE STATEMENT
54 year-old male, history of DM, HLD, presents for evaluation of R hand injury. Reports that 9 PM last night door slammed on his hand and had a cut to the R palm. Reports pain is sharp, severe, going up the forearm. Able to move all fingers. Denies any tingling/numbness/focal weakness. Last tetanus immunization unknown.

## 2019-09-15 NOTE — ED PROVIDER NOTE - PHYSICAL EXAMINATION
R hand exam: full range of motion of all fingers. Cap. refill < 2 sec. Hypothenar eminence with superficial 0.5 mm skin tear. Localized tenderness to palpation. R wrist full range of motion without swelling or tenderness to palpation. R hand exam: full range of motion of all fingers. Cap. refill < 2 sec. Hypothenar eminence with superficial 0.5 mm skin tear. Localized tenderness to palpation. R wrist full range of motion without swelling or tenderness to palpation.    Attending Physical: right hand: superficial 0.5 cm skin tear to hypothenar eminence with tenderness to palpation. FROM of wrist w/o swelling or tenderness

## 2019-09-15 NOTE — ED ADULT TRIAGE NOTE - WEIGHT METHOD
What Is The Reason For Today's Visit?: Full Body Skin Examination What Is The Reason For Today's Visit? (Being Monitored For X): concerning skin lesions on an annual basis stated

## 2019-09-15 NOTE — ED PROVIDER NOTE - CARE PLAN
Principal Discharge DX:	Contusion of right hand, initial encounter  Secondary Diagnosis:	Skin tear of right hand without complication, initial encounter

## 2019-09-19 NOTE — PACU DISCHARGE NOTE - NSPTMEETSDISCHCRITERIADT_GEN_A_CORE
HOSPITALIST PROGRESS NOTE    Patient Name: Aleah Calhoun    Attending: Ibrahima Schneider MD  : 1965      Date: 2019  Age: 54 year old       Hospital Day: Hospital Day: 11  Sex: female    Chief Complaint: bilateral breast tissue necrosis, breast cancer    Interval History/Subjective: Aleah Calhoun is a 54 year old female who was admitted on 2019 for sepsis and likely pyoderma gangrenosum.     Patient states she had some pain with dressing change this morning, but it was much better than previously. Otherwise pain relatively well controlled. She slept well last night. Her appetite is slowly improving. No nausea, abdominal pain, diarrhea. She does not like the Ensure supplements and does not wish to continue receiving these. No fever, chills, shortness of breath.    Medications:  Scheduled Medications:   • gabapentin  300 mg Oral Nightly   • predniSONE  60 mg Oral Daily with breakfast   • clobetasol   Topical 2 times per day   • acetaminophen  1,000 mg Oral 4x Daily   • celecoxib  100 mg Oral BID WC   • lactobacillus acidophilus  1 tablet Oral Daily   • sodium chloride (PF)  2 mL Intracatheter 2 times per day   • enoxaparin  40 mg Subcutaneous Daily   • fluticasone  1 spray Each Nare BID   • fluticasone-vilanterol  1 puff Inhalation Daily Resp   • pantoprazole  40 mg Oral QAM AC     Continuous Infusions:     Objective:  Vitals 24 Hour Range Last Value   Temperature Temp  Min: 97.2 °F (36.2 °C)  Max: 97.8 °F (36.6 °C) 97.2 °F (36.2 °C) (19)   Pulse Pulse  Min: 73  Max: 86 73 (19)   Respiratory Resp  Min: 18  Max: 19 18 (19)   Non-Invasive Blood Pressure BP  Min: 132/77  Max: 138/73 138/73 (19)   Pulse Oximetry SpO2  Min: 97 %  Max: 100 % 100 % (19)   FiO2          Vital Most Recent Value First Value   Weight (!) 139.4 kg Weight: 127 kg   Height 5' 3\" (160 cm) Height: 5' 3\" (160 cm)   Intake/Output:    I&O Last shift: I/O this shift:  In: 355.9  [I.V.:355.9]  Out: -     I&O Last 24 hours:     Intake/Output Summary (Last 24 hours) at 9/19/2019 1055  Last data filed at 9/19/2019 0733  Gross per 24 hour   Intake 4830.08 ml   Output 40 ml   Net 4790.08 ml       Last Stool Occurrence: 1 (09/17/19 1215)    Physical Exam:   General: Patient sitting up in bed. Does not appear to be in acute distress.  Psychiatric: Pleasant. Engages in conversation. Appropriate mood and affect.  HEENT: Normocephalic. Pupils equal. External ears and nose without abnormalities. Oropharynx moist.  Respiratory: Normal respiratory effort. No use of accessory muscles. Clear to auscultation throughout peripheral lung fields.  Cardiac: Regular rate and rhythm. Normal S1 and S2. No murmurs, rubs, or gallops.  Abdomen: Soft, nontender, nondistended. Bowel sounds normoactive.  Peripheral Vascular: Dorsalis pedis pulses 2+ bilaterally.  Extremities: 1+ bilateral lower extremity edema  Skin: Mastectomy incision sites with granulation tissue present. Violaceous areas improving compared to pictures in chart. Mild surrounding erythema. 2 CAROLINA drains on each breast, draining serosanguinous fluid.  Neuro: Alert and oriented x3. No gross neuro deficits. Moves all extremities spontaneously.    Labs:  Recent Labs   Lab 09/19/19  0758   WBC 11.6*   HGB 8.6*   HCT 26.4*   SODIUM 144   POTASSIUM 3.1*   CHLORIDE 112*   CO2 23   BUN 14   BUN CREATININE RATIO 30*   CREATININE 0.46*   GLUCOSE 111*   CALCIUM 7.5*     Imaging:  CXR 9/9/2019  Bilateral breast expanders. Possible subtle infiltrate in the suprahilar region on the left.  US liver/gallbladder/pancras 9/11/2019  Steatosis hepatitis, otherwise unremarkable upper abdomen ultrasound.  CT chest/abdomen/pelvis with contrast 9/11/2019  1.  Postsurgical changes of bilateral breasts with prominent skin thickening over the left breast/chest wall. No drainable fluid collection or abscess. Presumably the skin thickening is related to cellulitis but clinical  correlation is recommended to exclude malignancy.  2.  No acute intraabdominal findings.  CT chest/abdomen/pelvis 9/16/2019  1. Interval removal of bilateral breast tissue expanders and placement of bilateral breast subcutaneous drains. Residual superficial skin thickening involving both breasts, left greater than right, possibly cellulitis.   2. Left upper extremity PICC.  3. Otherwise, no evident source of infection in the chest, abdomen, or pelvis.    Assessment:  Active Hospital Problems    Diagnosis Date Noted   • Leukocytosis 09/17/2019     Priority: Low   • Acute blood loss anemia 09/17/2019     Priority: Low   • Hyponatremia 09/17/2019     Priority: Low   • Sepsis (CMS/[facility]) 09/13/2019     Priority: Low   • Cellulitis 09/13/2019     Priority: Low   • Infiltrating ductal carcinoma of right breast (CMS/[facility]) 08/12/2019     Priority: Low     Plan:    Likely pyoderma gangrenosum  -cultures negative thus far and antibiotics discontinued per ID 9/17  -WBC much improved to 11.6 this morning  -surgery consulted; appreciate assistance  -wound care consulted; appreciate assistance  -dermatology consulted; appreciate assistance  -surgical pathology 9/13 of bilateral breast tissue: epidermal ulceration with dense suppurative inflammation and focal necrosis  -continue prednisone and topical clobetasol  -will need home care at discharge for wounds  -4 CAROLINA drains in place; will remain in place at discharge    Sepsis, resolved  -initially felt to related to possible pneumonia, but no evidence of pneumonia on CT chest  -CT chest/abd/pelvis 9/11 and 9/16 concerning for left breast cellulitis  -has been afebrile   -leukocytosis improved; WBC 11.6 today  -procalcitonin 3.87>0.80>0.78  -urine culture 9/9 with no growth  -strep pneumo and legionella ag negative, respiratory pathogen panel negative  -blood cultures 9/9 negative  -repeat blood cultures 9/16 with no growth to date  -left breast fluid culture 9/9 and left breast  aspirate 9/13 negative, right breast tissue culture negative  -left breast skin tissue culture 9/13 with very rare coag negative staphylococcus  -C difficile 9/17 negative  -ID consulted; antibiotics discontinued 9/17    Breast cancer s/p bilateral mastectomy 9/6/2019  -follows with Dr. Espinoza  -s/p removal of tissue expanders 9/13  -wound care as above    Hyponatremia, resolved  -sodium normalized with IV hydration  -monitor    Acute blood loss anemia  -Hgb 8.6  -in setting of surgery  -no overt bleeding  -monitor    Hypokalemia  -K 3.1  -supplemented  -continue to monitor    Best practice:  DVT prophylaxis:Lovenox 40 mg sub q daily  Gamboa in Place: No  Central Line: Yes, medically necessary PICC line    Code Status:     Full Resuscitation    Disposition: likely discharge tomorrow pending clinical stability    Plan of care discussed with patient, the , the patient's nurse, general surgery    Care in coordination with Dr. Jennie Amezquita PA-C  Hospitalist Service  09/19/19     I personally saw and examined patient at bedside. I reviewed and discussed the above-mentioned assessment and plan with the RAMESH and we jointly formed the treatment plan above. I also made adjustments as necessary.    Pyoderma gangrenosum: WBC significantly improved and wounds improving on steroids. We will discuss tapering regimen with Dermatology. Her drains will remain in place on discharge she'll follow-up with general surgery regarding this. Wound care will continue to manage her as an outpatient as well.    Anemia: Hemoglobin is 20.6 from 9.4 yesterday. We will recheck tomorrow.    Physical Exam   Constitutional: She is well-developed, well-nourished, and in no distress. No distress.   HENT:   Head: Normocephalic and atraumatic.   Cardiovascular: Normal rate and regular rhythm. Exam reveals no friction rub.   No murmur heard.  Pulmonary/Chest: No respiratory distress. She has no wheezes. She has no rales.    Abdominal: Soft. Bowel sounds are normal. She exhibits no distension. There is no tenderness.   Skin:   Her chest wounds are somewhat improved, less drainage, less running erythema.        Ibrahima Schneider MD  9/19/2019 1:56 PM     13-Nov-2018 09:33

## 2019-09-30 NOTE — ED ADULT TRIAGE NOTE - BSA (M2)
2.15 Melolabial Transposition Flap Text: The defect edges were debeveled with a #15 scalpel blade.  Given the location of the defect and the proximity to free margins a melolabial flap was deemed most appropriate.  Using a sterile surgical marker, an appropriate melolabial transposition flap was drawn incorporating the defect.    The area thus outlined was incised deep to adipose tissue with a #15 scalpel blade.  The skin margins were undermined to an appropriate distance in all directions utilizing iris scissors.

## 2019-10-01 ENCOUNTER — OUTPATIENT (OUTPATIENT)
Dept: OUTPATIENT SERVICES | Facility: HOSPITAL | Age: 54
LOS: 1 days | End: 2019-10-01
Payer: MEDICARE

## 2019-10-01 DIAGNOSIS — Z98.890 OTHER SPECIFIED POSTPROCEDURAL STATES: Chronic | ICD-10-CM

## 2019-10-01 PROCEDURE — G9001: CPT

## 2019-10-11 DIAGNOSIS — Z71.89 OTHER SPECIFIED COUNSELING: ICD-10-CM

## 2020-02-21 NOTE — ED ADULT NURSE NOTE - PAIN RATING/NUMBER SCALE (0-10): REST
10 [Eyeglasses] : currently wearing eyeglasses [Anxiety] : anxiety [Negative] : Heme/Lymph [Headache] : no headache [Recent Weight Gain (___ Lbs)] : no recent weight gain [Feeling Fatigued] : not feeling fatigued [Seeing Double (Diplopia)] : no diplopia [Earache] : no earache [Mouth Sores] : no mouth sores [Dyspnea on exertion] : not dyspnea during exertion [Palpitations] : no palpitations [Lower Ext Edema] : no extremity edema [Chest Pain] : no chest pain [Wheezing] : no wheezing [Cough] : no cough [Abdominal Pain] : no abdominal pain [Nausea] : no nausea [Heartburn] : no heartburn [Dysuria] : no dysuria [Incontinence] : no incontinence [Joint Pain] : no joint pain [Limb Weakness (Paresis)] : no limb weakness [Joint Swelling] : no joint swelling [Itching] : no itching [Skin: A Rash] : no rash: [Dizziness] : no dizziness [Tremor] : no tremor was seen [Confusion] : no confusion was observed [Convulsions] : no convulsions [Excessive Thirst] : no polydipsia [Memory Lapses Or Loss] : no memory lapses or loss [Easy Bleeding] : no tendency for easy bleeding [Easy Bruising] : no tendency for easy bruising

## 2020-06-21 ENCOUNTER — APPOINTMENT (OUTPATIENT)
Dept: DISASTER EMERGENCY | Facility: CLINIC | Age: 55
End: 2020-06-21

## 2021-09-01 ENCOUNTER — APPOINTMENT (OUTPATIENT)
Dept: ORTHOPEDIC SURGERY | Facility: CLINIC | Age: 56
End: 2021-09-01
Payer: MEDICARE

## 2021-09-01 VITALS
WEIGHT: 230 LBS | SYSTOLIC BLOOD PRESSURE: 145 MMHG | DIASTOLIC BLOOD PRESSURE: 87 MMHG | HEART RATE: 80 BPM | BODY MASS INDEX: 34.07 KG/M2 | HEIGHT: 69 IN

## 2021-09-01 VITALS — TEMPERATURE: 96.6 F

## 2021-09-01 PROCEDURE — 99204 OFFICE O/P NEW MOD 45 MIN: CPT | Mod: 25

## 2021-09-01 PROCEDURE — 20611 DRAIN/INJ JOINT/BURSA W/US: CPT | Mod: RT

## 2021-09-01 PROCEDURE — 73564 X-RAY EXAM KNEE 4 OR MORE: CPT | Mod: RT

## 2021-09-01 NOTE — HISTORY OF PRESENT ILLNESS
[de-identified] : CC Bilateral knee\par \par HPI 55yo male presents with acute onset of 2 months of medial left knee pain as well as several years of anterior right knee pain[without injury]. The pain is worse, and rated a #8 out of 10, described as sharp, [without radiation].  Rest makes the pain better and walking makes the pain worse. The patient reports associated symptoms of pop click grinding instability causing falls. The patient reports pain at night affecting sleep, and reports left knee pain is currently similar pain previously in the right knee prior to arthroscopic surgery however his right knee pain is different from his previous pain.\par \par The patient has tried the following treatments:\par Activity modification	+\par Ice/Compression  	+\par Braces    		-\par Nsaids    		+\par Physical Therapy 	+ 2 mo hep\par Cortisone Injection	-\par Visco Injection		-\par Zilretta			-\par Arthroscopy		+ right knee 2017\par \par Review of Systems is positive for the above musculoskeletal symptoms and is otherwise non-contributory for general, constitutional, psychiatric, neurologic, HEENT, cardiac, respiratory, gastrointestinal, reproductive, lymphatic, and dermatologic complaints.\par \par Consult by  [ ]

## 2021-09-01 NOTE — PHYSICAL EXAM
[de-identified] : Physical Examination\par General: well nourished, in no acute distress, alert and oriented to person, place and time\par Psychiatric: normal mood and affect, no abnormal movements or speech patterns\par Eyes: vision intact with glasses\par \par \par Musculoskeletal Examination\par Ambulation	with antalgic gait, with assistive devices\par \par Knee			Right			Left\par General\par      Swelling/Deformity	normal			normal	\par      Skin			healed incisions			normal\par      Erythema		-			-\par      Standing Alignment	neutral			neutral\par      Effusion		none			none\par Range of Motion\par      Hip			full painless ROM		full painless ROM\par      Knee Flexion		110			120\par      Knee Extension	0			0\par Patella\par      J Sign		-			-\par      Quad Medial/Lateral	1/1 1/1\par      Apprehension		-			-\par      Nathanael's		+			-\par      Grind Sign		+			-\par      Crepitus		+			+\par Palpation\par      Medial Joint Line	-			+\par      Medial Fem Condyle Artic	-			-\par      Medial Fem Condyle Epic	-			-\par      Lateral Joint Line	-			-\par      Quad Tendon		-			-\par      Patella Tendon	-			-\par      Medial Patella		-			-\par      Lateral Patella 	+			-\par      Posterior Knee	-			-\par Ligamentous\par      Varus @ 0° / 30°	-/-			-/-\par      Valgus @ 0° / 30°	-/-			-/-\par      Lachman		-			-\par      Pivot Shift		-			-\par      Anterior Drawer	-			-\par      Posterior Drawer	-			-\par Meniscus\par      Ashwini		-			+ medial pain\par      Flexion Pinch		+ anterior pain			-\par Strength Examination/Atrophy\par      Hip Flexors 		5+			5+\par      Quadriceps		5+			5+\par      Hamstring		5+			5+\par      Tibialis Anterior	5+			5+\par      Achilles/Soleus	5+			5+\par Sensation\par      Deep Peroneal	normal			normal\par      Superficial Peroneal 	normal			normal\par      Sural  		normal			normal\par      Posterior Tibial 	normal			normal\par      Saphneous 		normal			normal\par Pulses\par      DP			2+			2+\par  [de-identified] : 4 views of the bilateral knee (standing AP, flexing standing AP, 30degree flexed lateral,  sunrise view)\par were ordered, obtained and evaluated by myself today and\par demonstrate:\par Right\par There is no narrowing\par Trace osteophytic lipping\par Trace suprapatellar effusion\par Small lateral osteophytes with trace patellofemoral joint space loss [without] evidence of tilt [or] subluxation on sunrise view\par Normal soft tissue density\par Otherwise normal osseous bone structure without fracture or dislocation\par \par Left\par There is no narrowing\par Trace osteophytic lipping\par Trace suprapatellar effusion\par Trace patellofemoral joint space loss [without] evidence of tilt [or] subluxation on sunrise view\par Normal soft tissue density\par Otherwise normal osseous bone structure without fracture or dislocation

## 2021-09-01 NOTE — DISCUSSION/SUMMARY
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 Given the current findings for the patient, I recommend proceeding with advanced imaging to better characterize and diagnose the problem to aid patient care, management and treatment guidance as I suspect an internal injury to the knee not diagnosed on non-diagnostic plain radiographs causing the patients symptoms and physical examination to help provide surgical management planning.\par \par Therefore given the patients continued symptoms despite [ non-operative management ] with continued pain affecting ADLs and inability to do activities limiting quality of life as well as locking and instability significant for patient falls potentially placing the patient at increased risk for exacerbating the injury or causing further injury to the knee, an examination and history consistent with injury to an internal knee derangement and a non-diagnostic radiograph I recommend obtaining an MRI to assess the knee's internal structures for preoperative planning. I discussed with the patient that I am ordering an MRI to assess the soft tissue structures in the joint such as ligaments and tendons, as well as to assess the cartilage and menisci as well as for any bony edema. The test will need insurance approval and will take place at a [facility] or outside facility. If the patient elects to obtain the MRI from an outside facility, the patient understands they have been instructed to bring in both the final radiologist read and a CD/DVD with the MRI images to allow review of the imaging test by myself during the follow up visit. I do not recommend obtaining an open standing MRI as the quality of the images is subpar and makes it difficult to diagnose intra-articular derangements and guide care discussion and decision making.\par The patient was prescribed Diclofenac PO non-steroidal anti-inflammatory medication. 50mg tablets twice daily to be taken for at least 1-2 weeks in a row and then PRN afterwards. Risks and benefits were discussed and include but not limited to renal damage and GI ulceration and bleeding.  They were advised to take with food to limit stomach upset as well as warned to stop the medication if worsening gastric pain or dizziness or other side effects. Also to immediately stop the medication and seek appropriate medical attention if any severe stomach ache, gastritis, black/red vomit, black/red stools or any other medical concern.\par \par \par For the right knee recommend physical therapy and lateral J brace\par \par Procedure Note:\par \par Verbal consent was obtained for an arthrocentesis and intra-articular ketorolac injection of the RIGHT knee, after the risks and benefits were discussed with the patient. Potential adverse effects were discussed including but not limited to bleeding, skin/joint infection, local skin reactions including bleaching, bruising, stiffness, soreness, vasovagal episodes, transient hyperglycemia, avascular necrosis, pseudo-septic type reactions, post injection joint pain, allergic reaction to product or anesthetic and other rare but potential adverse effects along with benefits including decreased pain and improved stability prior to obtaining verbal informed consent. It was also discussed that for some patients the treatment is ineffective and there are no guarantees that the patient will experience improvement as the result of the injection. In rare occasions the injection can cause worsening of pain.\par \par The use of a Sonosite 15-6 MHz linear transducer with live ultrasound guidance of the knee was necessary given the patient's BMI and local body habitus overlying and obscuring the accurate identification of normal body bony anatomy used to identify the injection site and the depth of soft tissue envelope necessitating a longer than normal needle to reach the joint space, and to confirm the location of the needle tip and intra-articular delivery of the medication. Without the use of live ultrasound guidance the injection would have been more difficult and place the patient's neurovascular structures at risk from the longer needle needed to traverse the soft tissue envelope.\par \par A 6 inch bolster was placed underneath the knee to place the knee in a slightly flexed position. The superolateral injection site was identified using the ultrasound probe to identify the suprapatellar space and superior boarder of the patella first longitudinally and then transversely. The injection site was marked and prepped with a ChloraPrep swab and anesthetized with ethylchloride skin anesthesia. Using sterile technique a 20g 3 1/2 in spinal needle with 4 cc total of 1cc 1% lidocaine without epinephrine, 1cc 0.25% Marcaine without epinephrine and 2cc of 60mg/mL Ketorolac was passed through the injection site towards the suprapatellar space under live ultrasound guidance and noted to penetrate the joint capsule. The medication was injected without resistance under live ultrasound visualization and noted to flow into the suprapatellar joint space. The injection site was sterilely dressed, there was minimal blood loss. The patient tolerated this procedure without any complications done by myself. Images were recorded and saved.\par \par The patient has been advised that if they notice any worsening of symptoms or any problems to contact me and seek care from a qualified medical professional. The patient was instructed to ice the knee and take NSAID medication on an as needed basis if the patient feels discomfort.\par \par The patient verifies their understanding the the visit, diagnosis and plan. They agree with the treatment plan and will contact the office with any questions or problems.\par \par FU after MRI is obtained

## 2021-10-12 ENCOUNTER — APPOINTMENT (OUTPATIENT)
Dept: ORTHOPEDIC SURGERY | Facility: CLINIC | Age: 56
End: 2021-10-12

## 2021-10-24 NOTE — ASU PATIENT PROFILE, ADULT - BILL OF RIGHTS/ADMISSION INFORMATION PROVIDED TO:
[facility] urgent care Emergency Department Lab result notification [Adult-Female]    [facility] ED lab result protocol used  Herpes Simplex    Reason for call  Notify of lab results, assess symptoms,  review ED providers recommendations/discharge instructions (if necessary) and advise per ED lab result f/u protocol    Lab Result (including Rx patient on, if applicable)  Final result for Herpes simplex Virus 1 is [NEGATIVE] and 2 IgG is [POSITIVE].    [facility] Emergency Dept discharge antibiotic prescribed: None  Recommendations in treatment per [facility] ED lab result Herpes Simple protocol  Information table from Emergency Dept Provider visit on 10/20/21  Symptoms reported at ED visit (Chief complaint, HPI) 33 year old who presents to clinic today for the following health issues:     Urgent Care and STD        HPI      1 week of vaginal discharge. Patient wanted to be tested for STIs as well as BV and yeast.       Significant Medical hx, if applicable (i.e. CKD, diabetes) Reviewed   Allergies Allergies   Allergen Reactions     Buspirone Other (See Comments)     Suicidal thoughts     Accutane Rash      Weight, if applicable Wt Readings from Last 2 Encounters:   10/20/21 126.6 kg (279 lb)   10/06/21 130.5 kg (287 lb 12.8 oz)      Coumadin/Warfarin [Yes /No] No   Creatinine Level (mg/dl) Creatinine   Date Value Ref Range Status   05/28/2019 0.93 0.52 - 1.04 mg/dL Final      Creatinine clearance (ml/min), if applicable Creatinine clearance cannot be calculated (Patient's most recent lab result is older than the maximum 30 days allowed.)   Pregnant (Yes/No/NA) ?   Breastfeeding (Yes/No/NA) ?   ED providers Impression and Plan (applicable information) Special screening examination for chlamydial disease  - Chlamydia & Gonorrhea by PCR, GICH/Range - Clinic Collect  - HIV Antigen Antibody Combo; Future  - Treponema Abs w Reflex to RPR and Titer; Future  - Herpes Simplex Virus 1 and 2 IgG;  Future  - Hepatitis C antibody; Future  - Hepatitis C RNA, quantitative; Future  - HIV Antigen Antibody Combo  - Treponema Abs w Reflex to RPR and Titer  - Herpes Simplex Virus 1 and 2 IgG  - Hepatitis C antibody  - Hepatitis C RNA, quantitative     Vaginal discharge  - Wet prep - Clinic Collect  - UA macro with reflex to Microscopic and Culture - Clinc Collect     Bacterial vaginosis  - metroNIDAZOLE (FLAGYL) 500 MG tablet; Take 1 tablet (500 mg) by mouth 2 times daily for 7 days   ED diagnosis  Bacterial vaginosis     Special screening examination for chlamydial disease     Vaginal discharge       ED provider  Valdez Galan, PAJacquelinC             RN Assessment (Patient s current Symptoms), include time called.  [Insert Left message here if message left]  4:13 Left voicemail message requesting a call back to [facility] ED Lab Result RN at [phone].  RN is available every day between 9 a.m. and 5:30 p.m.            Jennyfer Roland RN  [facility]  Emergency Dept Lab Result RN  Ph# [phone]     Copy of Lab result   Herpes Simplex Virus 1 and 2 IgG  Order: [id number]  Status:  Final result   Visible to patient:  Yes (MyChart) Next appt:  10/27/2021 at 12:30 PM in Physical Therapy (Mark Wen PT) Dx:  Special screening examination for chl...   1 Result Note   Ref Range & Units 4 d ago   HSV Type 1 IgG Instrument Value <0.90 Index 0.51    Herpes Simplex Virus Type 1 IgG Antibody No HSV-1 IgG antibodies detected No HSV-1 IgG antibodies detected.    HSV Type 2 IgG Instrument Value <0.90 Index 14.90High     Herpes Simplex Virus Type 2 IgG Antibody No HSV-2 IgG antibodies detected Positive.  IgG antibody to HSV-2 detected.Abnormal     Resulting Agency  [facility]         Specimen Collected: 10/20/21  8:04 PM Last Resulted: 10/22/21 11:22 AM              Patient

## 2021-11-12 ENCOUNTER — APPOINTMENT (OUTPATIENT)
Dept: ORTHOPEDIC SURGERY | Facility: CLINIC | Age: 56
End: 2021-11-12
Payer: MEDICARE

## 2021-11-12 PROCEDURE — 99214 OFFICE O/P EST MOD 30 MIN: CPT

## 2021-11-12 NOTE — HISTORY OF PRESENT ILLNESS
[de-identified] : CC Bilateral knee\par \par HPI 55yo male presents for mri review of acute onset of 2 months of medial left knee pain as well as several years of anterior right knee pain[without injury]. The pain is worse, and rated a #8 out of 10, described as sharp, [without radiation].  Rest makes the pain better and walking makes the pain worse. The patient reports associated symptoms of pop click grinding instability causing falls. The patient reports pain at night affecting sleep, and reports left knee pain is currently similar pain previously in the right knee prior to arthroscopic surgery however his right knee pain is different from his previous pain.\par \par The patient has tried the following treatments:\par Activity modification	+\par Ice/Compression  	+\par Braces    		-\par Nsaids    		+\par Physical Therapy 	+ 2 mo hep\par Cortisone Injection	-\par Visco Injection		-\par Zilretta			-\par Arthroscopy		+ right knee 2017\par \par severe right anterior knee pain\par \par Review of Systems is positive for the above musculoskeletal symptoms and is otherwise non-contributory for general, constitutional, psychiatric, neurologic, HEENT, cardiac, respiratory, gastrointestinal, reproductive, lymphatic, and dermatologic complaints.\par \par Consult by  [ ]

## 2021-11-12 NOTE — DISCUSSION/SUMMARY
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 The patella has a convex v shaped joint surface and the trochlea a matching concave v shape. The patella runs in the trochlea valley as the knee bends, increasing the leverage and allowing knee range of motion. However the patellofemoral joint experiences 20-50x the body weight in force when going up and down stairs during mid-flexion of the knee. The joint is most symptomatic with prolonged knee flexion or going up/down stairs. The treatment for this problem is predominantly non-operative consisting of patient education, activity modification, physical therapy with focus on VMO strengthening, oral and topical non-steroidal anti-inflammatories, knee braces including lateral J/shield's brace or infrapatellar band, and corticosteroid/viscosupplementation injection. The symptoms are chronic and are difficult to treat. In cases of failure to respond to non-operative management surgical options can be considered.\par \par Lateral J brace\par \par Physical therapy prescribed with knee ROM exercises, quad/hamstring/VMO/Hip abductor strengthening, knee taping, patella mobilization, modalities PRN, and home exercise program.\par \par The patient was prescribed Diclofenac PO non-steroidal anti-inflammatory medication. 50mg tablets twice daily to be taken for at least 1-2 weeks in a row and then PRN afterwards. Risks and benefits were discussed and include but not limited to renal damage and GI ulceration and bleeding.  They were advised to take with food to limit stomach upset as well as warned to stop the medication if worsening gastric pain or dizziness or other side effects. Also to immediately stop the medication and seek appriate medical attention if any severe stomach ache, gastritis, black/red vomit, black/red stools or any other medical concern.\par \par hold csi due to dm status\par \par The patient verifies their understanding the the visit, diagnosis and plan. They agree with the treatment plan and will contact the office with any questions or problems.\par \par Follow up\par PRN\par

## 2021-11-12 NOTE — PHYSICAL EXAM
[de-identified] : Physical Examination\par General: well nourished, in no acute distress, alert and oriented to person, place and time\par Psychiatric: elevated mood and affect, no abnormal movements or speech patterns\par Eyes: vision intact with glasses\par \par \par Musculoskeletal Examination\par Ambulation	with antalgic gait, with assistive devices\par \par Knee			Right			Left\par General\par      Swelling/Deformity	normal			normal	\par      Skin			healed incisions			normal\par      Erythema		-			-\par      Standing Alignment	neutral			neutral\par      Effusion		none			none\par Range of Motion\par      Hip			full painless ROM		full painless ROM\par      Knee Flexion		110			120\par      Knee Extension	0			0\par Patella\par      J Sign		-			-\par      Quad Medial/Lateral	1/1 1/1\par      Apprehension		-			-\par      Nathanael's		+			-\par      Grind Sign		+			-\par      Crepitus		+			+\par Palpation\par      Medial Joint Line	-			+\par      Medial Fem Condyle Artic	-			-\par      Medial Fem Condyle Epic	-			-\par      Lateral Joint Line	-			-\par      Quad Tendon		-			-\par      Patella Tendon	-			-\par      Medial Patella		-			-\par      Lateral Patella 	+			-\par      Posterior Knee	-			-\par Ligamentous\par      Varus @ 0° / 30°	-/-			-/-\par      Valgus @ 0° / 30°	-/-			-/-\par      Lachman		-			-\par      Pivot Shift		-			-\par      Anterior Drawer	-			-\par      Posterior Drawer	-			-\par Meniscus\par      Ashwini		-			+ medial pain\par      Flexion Pinch		+ anterior pain			-\par Strength Examination/Atrophy\par      Hip Flexors 		5+			5+\par      Quadriceps		5+			5+\par      Hamstring		5+			5+\par      Tibialis Anterior	5+			5+\par      Achilles/Soleus	5+			5+\par Sensation\par      Deep Peroneal	normal			normal\par      Superficial Peroneal 	normal			normal\par      Sural  		normal			normal\par      Posterior Tibial 	normal			normal\par      Saphneous 		normal			normal\par Pulses\par      DP			2+			2+\par  [de-identified] : 4 views of the bilateral knee (standing AP, flexing standing AP, 30degree flexed lateral,  sunrise view)\par 9-1-21\par demonstrate:\par Right\par There is no narrowing\par Trace osteophytic lipping\par Trace suprapatellar effusion\par Small lateral osteophytes with trace patellofemoral joint space loss [without] evidence of tilt [or] subluxation on sunrise view\par Normal soft tissue density\par Otherwise normal osseous bone structure without fracture or dislocation\par \par Left\par There is no narrowing\par Trace osteophytic lipping\par Trace suprapatellar effusion\par Trace patellofemoral joint space loss [without] evidence of tilt [or] subluxation on sunrise view\par Normal soft tissue density\par Otherwise normal osseous bone structure without fracture or dislocation\par \par \par MRI Left knee from [facility] on 9-14-21\par My impression of the images:\par Quality of the MRI is good\par Medial Meniscus ok\par Lateral Meniscus ok\par There is no chondral loss in the tri compartments\par There is [Not] bone marrow edema[/subchondral cysts] in the tri compartments\par LCL [Is] intact\par MCL [Is] intact\par ACL [Is] intact\par PCL [Is] intact \par Quadriceps Tendon [Is] intact\par Patella Tendon [Is] intact\par \par The Final Radiologist Impression:\par Joint space/synovium: There is no effusion or synovial thickening.  There is no popliteal cyst.\par \par Bones/articular cartilage: There is no fracture or bone marrow edema.  The articular cartilage is preserved.  \par \par Menisci: The medial meniscus is intact.  The lateral meniscus is intact.\par \par Ligaments/Tendons: The anterior cruciate and posterior cruciate ligaments are intact.  The medial collateral ligament is intact.  The lateral collateral ligament complex is intact.  The extensor tendon mechanism is unremarkable.\par \par IMPRESSION: Normal knee MRI

## 2021-12-17 ENCOUNTER — APPOINTMENT (OUTPATIENT)
Dept: ORTHOPEDIC SURGERY | Facility: CLINIC | Age: 56
End: 2021-12-17
Payer: MEDICARE

## 2021-12-17 DIAGNOSIS — M22.42 CHONDROMALACIA PATELLAE, LEFT KNEE: ICD-10-CM

## 2021-12-17 PROCEDURE — 99214 OFFICE O/P EST MOD 30 MIN: CPT

## 2021-12-17 NOTE — HISTORY OF PRESENT ILLNESS
[de-identified] : CC Right knee\par \par HPI 55yo male presents for pt and nsaids fu of acute onset of 2 months of medial left knee pain as well as several years of anterior right knee pain[without injury]. The pain is worse, and rated a #8 out of 10, described as sharp, [without radiation].  Rest makes the pain better and walking makes the pain worse. The patient reports associated symptoms of pop click grinding instability causing falls. The patient reports pain at night affecting sleep, and reports left knee pain is currently similar pain previously in the right knee prior to arthroscopic surgery however his right knee pain is different from his previous pain.\par \par The patient has tried the following treatments:\par Activity modification	+\par Ice/Compression  	+\par Braces    		-\par Nsaids    		+ diclofenac mild temporary relief\par Physical Therapy 	+ 2 mo help\par Cortisone Injection	+ 9/2021 help 2-3 weeks\par Visco Injection		-\par Zilretta			-\par Arthroscopy		+ right knee 2017\par \par knee pain worsening to the anterior right knee; improved to the medial left knee\par csi 9/2021 relief for 2-3 weeks\par Physical therapy sessions completed (helpful)\par does not use lateral J consistently due to skin irritation\par patient interested in more csi \par \par Review of Systems is positive for the above musculoskeletal symptoms and is otherwise non-contributory for general, constitutional, psychiatric, neurologic, HEENT, cardiac, respiratory, gastrointestinal, reproductive, lymphatic, and dermatologic complaints.\par \par Consult by  [ ]

## 2021-12-17 NOTE — PHYSICAL EXAM
[de-identified] : Physical Examination\par General: well nourished, in no acute distress, alert and oriented to person, place and time\par Psychiatric: elevated mood and affect, no abnormal movements or speech patterns\par Eyes: vision intact with glasses\par \par \par Musculoskeletal Examination\par Ambulation	with antalgic gait, with assistive devices\par \par Knee			Right			Left\par General\par      Swelling/Deformity	normal			normal	\par      Skin			healed incisions			normal\par      Erythema		-			-\par      Standing Alignment	neutral			neutral\par      Effusion		none			none\par Range of Motion\par      Hip			full painless ROM		full painless ROM\par      Knee Flexion		110			120\par      Knee Extension	0			0\par Patella\par      J Sign		-			-\par      Quad Medial/Lateral	1/1 1/1\par      Apprehension		-			-\par      Nathanael's		+			-\par      Grind Sign		+			-\par      Crepitus		+			+\par Palpation\par      Medial Joint Line	-			+\par      Medial Fem Condyle Artic	-			-\par      Medial Fem Condyle Epic	-			-\par      Lateral Joint Line	-			-\par      Quad Tendon		-			-\par      Patella Tendon	-			-\par      Medial Patella		-			-\par      Lateral Patella 	+			-\par      Posterior Knee	-			-\par Ligamentous\par      Varus @ 0° / 30°	-/-			-/-\par      Valgus @ 0° / 30°	-/-			-/-\par      Lachman		-			-\par      Pivot Shift		-			-\par      Anterior Drawer	-			-\par      Posterior Drawer	-			-\par Meniscus\par      Ashwini		-			+ medial pain\par      Flexion Pinch		+ anterior pain			-\par Strength Examination/Atrophy\par      Hip Flexors 		5+			5+\par      Quadriceps		5+			5+\par      Hamstring		5+			5+\par      Tibialis Anterior	5+			5+\par      Achilles/Soleus	5+			5+\par Sensation\par      Deep Peroneal	normal			normal\par      Superficial Peroneal 	normal			normal\par      Sural  		normal			normal\par      Posterior Tibial 	normal			normal\par      Saphneous 		normal			normal\par Pulses\par      DP			2+			2+\par  [de-identified] : 4 views of the bilateral knee (standing AP, flexing standing AP, 30degree flexed lateral,  sunrise view)\par 9-1-21\par demonstrate:\par Right\par There is no narrowing\par Trace osteophytic lipping\par Trace suprapatellar effusion\par Small lateral osteophytes with trace patellofemoral joint space loss [without] evidence of tilt [or] subluxation on sunrise view\par Normal soft tissue density\par Otherwise normal osseous bone structure without fracture or dislocation\par \par Left\par There is no narrowing\par Trace osteophytic lipping\par Trace suprapatellar effusion\par Trace patellofemoral joint space loss [without] evidence of tilt [or] subluxation on sunrise view\par Normal soft tissue density\par Otherwise normal osseous bone structure without fracture or dislocation\par \par \par MRI Left knee from [facility] on 9-14-21\par My impression of the images:\par Quality of the MRI is good\par Medial Meniscus ok\par Lateral Meniscus ok\par There is no chondral loss in the tri compartments\par There is [Not] bone marrow edema[/subchondral cysts] in the tri compartments\par LCL [Is] intact\par MCL [Is] intact\par ACL [Is] intact\par PCL [Is] intact \par Quadriceps Tendon [Is] intact\par Patella Tendon [Is] intact\par \par The Final Radiologist Impression:\par Joint space/synovium: There is no effusion or synovial thickening.  There is no popliteal cyst.\par \par Bones/articular cartilage: There is no fracture or bone marrow edema.  The articular cartilage is preserved.  \par \par Menisci: The medial meniscus is intact.  The lateral meniscus is intact.\par \par Ligaments/Tendons: The anterior cruciate and posterior cruciate ligaments are intact.  The medial collateral ligament is intact.  The lateral collateral ligament complex is intact.  The extensor tendon mechanism is unremarkable.\par \par IMPRESSION: Normal knee MRI

## 2021-12-17 NOTE — DISCUSSION/SUMMARY
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 The patella has a convex v shaped joint surface and the trochlea a matching concave v shape. The patella runs in the trochlea valley as the knee bends, increasing the leverage and allowing knee range of motion. However the patellofemoral joint experiences 20-50x the body weight in force when going up and down stairs during mid-flexion of the knee. The joint is most symptomatic with prolonged knee flexion or going up/down stairs. The treatment for this problem is predominantly non-operative consisting of patient education, activity modification, physical therapy with focus on VMO strengthening, oral and topical non-steroidal anti-inflammatories, knee braces including lateral J/shield's brace or infrapatellar band, and corticosteroid/viscosupplementation injection. The symptoms are chronic and are difficult to treat. In cases of failure to respond to non-operative management surgical options can be considered.\par \par The patient was prescribed Diclofenac PO non-steroidal anti-inflammatory medication. 50mg tablets twice daily to be taken for at least 1-2 weeks in a row and then PRN afterwards. Risks and benefits were discussed and include but not limited to renal damage and GI ulceration and bleeding.  They were advised to take with food to limit stomach upset as well as warned to stop the medication if worsening gastric pain or dizziness or other side effects. Also to immediately stop the medication and seek appriate medical attention if any severe stomach ache, gastritis, black/red vomit, black/red stools or any other medical concern.\par \par Due to failure of prior non-operative modalities of treatment including physical therapy, activity modification, non-steroidal anti-inflammatory medications, and weight-loss with failure of multiple prior corticosteroid injections without appropriate improvement in symptoms and concern for too many frequent injections causing increased risk for adverse events, I am ordering a time release cortisone injection Zilretta right knee and will obtain insurance authorization. The patient will be contacted by our authorization department over its status, copayment and when available for injection.\par \par The patient verifies their understanding the the visit, diagnosis and plan. They agree with the treatment plan and will contact the office with any questions or problems.\par \par Follow up for injections\par \par

## 2022-01-25 ENCOUNTER — APPOINTMENT (OUTPATIENT)
Dept: ORTHOPEDIC SURGERY | Facility: CLINIC | Age: 57
End: 2022-01-25
Payer: MEDICARE

## 2022-01-25 PROCEDURE — 20611 DRAIN/INJ JOINT/BURSA W/US: CPT | Mod: RT

## 2022-01-25 NOTE — PROCEDURE
[de-identified] : Chief complaint:     Right  knee pain\par \par Diagnosis:              Right knee osteoarthritis\par \par \par Injection:\par Zilretta x1\par ZA 21006 expiration February 2022\par \par \par Procedure Note:\par \par Risks and benefits of an arthrocentesis and intraarticular extended release cortisone injection of the RIGHT knee were discussed with the patient. Potential adverse effects were discussed including but not limited to bleeding, skin/ joint infection, local skin reactions including bleaching, bruising, stiffness, soreness, vasovagal episodes, transient hyperglycemia, avascular necrosis, pseudo-septic type reactions, post injection joint pain, elevation of blood glucose, allergic reaction to product or anesthetic and other rare but potential adverse effects along with benefits including decreased pain and improved stability prior to obtaining verbal informed consent. It was also discussed that for some patients the treatment is ineffective and there are no guarantees that the patient will experience improvement as the result of the injection. In rare occasions the injection can cause worsening of pain.\par \par Zilretta vials were preparred according to instructions and drawn up into a 5cc syringe.\par \par The use of a Sonosite 15-6 MHz linear transducer with live ultrasound guidance of the knee was necessary given the patient's BMI and local body habitus overlying and obscuring the accurate identification of normal body bony anatomy used to identify the injection site and the depth of soft tissue envelope necessitating a longer than normal needle to reach the joint space, and to confirm the location of the needle tip and intra-articular delivery of the medication. Without the use of live ultrasound guidance the injection would have been more difficult and place the patient's neurovascular structures at risk from the longer needle needed to traverse the soft tissue envelope.\par \par A 6 inch bolster was placed underneath the knee to place the Right knee in a slightly flexed position. The superolateral injection site was identified using the ultrasound probe to identify the suprapatellar space and superior boarder of the patella first longitudinally and then transversely. The injection site was marked and prepped with a ChloraPrep swab and anesthetized with ethylchloride skin anesthesia. Under sterile technique a 20g 3 1/2 in spinal needle with a preloaded Zilretta syringe was passed through the injection site towards the suprapatellar space under live ultrasound guidance and noted to penetrate the joint capsule. The medication was injected without resistance under live ultrasound visualization and noted to flow into the suprapatellar joint space. The injection site was sterilely dressed, there was minimal blood loss.\par \par The patient tolerated this procedure without any complications done by myself. Images were recorded and saved.\par \par The patient has been advised that if they notice any worsening of symptoms or any problems to contact me and seek care from a qualified medical professional. The patient was instructed to ice the knee and take NSAID medication on an as needed basis if the patient feels discomfort.\par \par Followup injection [6 months]

## 2022-01-25 NOTE — PROCEDURE
[de-identified] : Chief complaint:     Right  knee pain\par \par Diagnosis:              Right knee osteoarthritis\par \par \par Injection:\par Zilretta x1\par ZA 21006 expiration February 2022\par \par \par Procedure Note:\par \par Risks and benefits of an arthrocentesis and intraarticular extended release cortisone injection of the RIGHT knee were discussed with the patient. Potential adverse effects were discussed including but not limited to bleeding, skin/ joint infection, local skin reactions including bleaching, bruising, stiffness, soreness, vasovagal episodes, transient hyperglycemia, avascular necrosis, pseudo-septic type reactions, post injection joint pain, elevation of blood glucose, allergic reaction to product or anesthetic and other rare but potential adverse effects along with benefits including decreased pain and improved stability prior to obtaining verbal informed consent. It was also discussed that for some patients the treatment is ineffective and there are no guarantees that the patient will experience improvement as the result of the injection. In rare occasions the injection can cause worsening of pain.\par \par Zilretta vials were preparred according to instructions and drawn up into a 5cc syringe.\par \par The use of a Sonosite 15-6 MHz linear transducer with live ultrasound guidance of the knee was necessary given the patient's BMI and local body habitus overlying and obscuring the accurate identification of normal body bony anatomy used to identify the injection site and the depth of soft tissue envelope necessitating a longer than normal needle to reach the joint space, and to confirm the location of the needle tip and intra-articular delivery of the medication. Without the use of live ultrasound guidance the injection would have been more difficult and place the patient's neurovascular structures at risk from the longer needle needed to traverse the soft tissue envelope.\par \par A 6 inch bolster was placed underneath the knee to place the Right knee in a slightly flexed position. The superolateral injection site was identified using the ultrasound probe to identify the suprapatellar space and superior boarder of the patella first longitudinally and then transversely. The injection site was marked and prepped with a ChloraPrep swab and anesthetized with ethylchloride skin anesthesia. Under sterile technique a 20g 3 1/2 in spinal needle with a preloaded Zilretta syringe was passed through the injection site towards the suprapatellar space under live ultrasound guidance and noted to penetrate the joint capsule. The medication was injected without resistance under live ultrasound visualization and noted to flow into the suprapatellar joint space. The injection site was sterilely dressed, there was minimal blood loss.\par \par The patient tolerated this procedure without any complications done by myself. Images were recorded and saved.\par \par The patient has been advised that if they notice any worsening of symptoms or any problems to contact me and seek care from a qualified medical professional. The patient was instructed to ice the knee and take NSAID medication on an as needed basis if the patient feels discomfort.\par \par Followup injection [6 months]

## 2022-03-09 ENCOUNTER — APPOINTMENT (OUTPATIENT)
Dept: OTOLARYNGOLOGY | Facility: CLINIC | Age: 57
End: 2022-03-09
Payer: MEDICARE

## 2022-03-09 VITALS
DIASTOLIC BLOOD PRESSURE: 79 MMHG | HEART RATE: 60 BPM | WEIGHT: 246 LBS | HEIGHT: 69 IN | BODY MASS INDEX: 36.43 KG/M2 | SYSTOLIC BLOOD PRESSURE: 136 MMHG

## 2022-03-09 PROCEDURE — 31231 NASAL ENDOSCOPY DX: CPT

## 2022-03-09 PROCEDURE — 99204 OFFICE O/P NEW MOD 45 MIN: CPT | Mod: 25

## 2022-03-09 RX ORDER — DICLOFENAC SODIUM 50 MG/1
50 TABLET, DELAYED RELEASE ORAL
Qty: 60 | Refills: 1 | Status: DISCONTINUED | COMMUNITY
Start: 2021-11-12 | End: 2022-03-09

## 2022-03-09 RX ORDER — DICLOFENAC SODIUM 50 MG/1
50 TABLET, DELAYED RELEASE ORAL
Qty: 60 | Refills: 1 | Status: DISCONTINUED | COMMUNITY
Start: 2021-12-17 | End: 2022-03-09

## 2022-03-09 NOTE — HISTORY OF PRESENT ILLNESS
[de-identified] : 56 year old male presents for evaluation for post nasal drip for about one year.\par Reports intermittent nasal congestion especially at night and post nasal drip with clear/greenish phlegm production when he lays down to sleep every night\par Denies anterior rhinorrhea, facial pain and pressure or epistaxis.\par Sense of smell is good\par Recurrent sinus infections: No\par Denies use of OTC allergy medication, nasal sprays or nasal rinses.\par Current every day cigarette smoker\par Also w heartburn\par \par PMH: DM, HTN, arthritis

## 2022-04-27 ENCOUNTER — APPOINTMENT (OUTPATIENT)
Dept: OTOLARYNGOLOGY | Facility: CLINIC | Age: 57
End: 2022-04-27
Payer: MEDICARE

## 2022-04-27 VITALS
BODY MASS INDEX: 34.8 KG/M2 | HEIGHT: 69 IN | HEART RATE: 69 BPM | SYSTOLIC BLOOD PRESSURE: 127 MMHG | DIASTOLIC BLOOD PRESSURE: 78 MMHG | WEIGHT: 235 LBS

## 2022-04-27 DIAGNOSIS — K21.9 GASTRO-ESOPHAGEAL REFLUX DISEASE W/OUT ESOPHAGITIS: ICD-10-CM

## 2022-04-27 PROCEDURE — 31231 NASAL ENDOSCOPY DX: CPT

## 2022-04-27 PROCEDURE — 99213 OFFICE O/P EST LOW 20 MIN: CPT | Mod: 25

## 2022-04-27 NOTE — HISTORY OF PRESENT ILLNESS
[de-identified] : 56M with PND presents for follow-up\par LCV 3/9/22\par Started on INCS but stopped due to HA\par Has made some dietary changes in regard to reflux, also drinking more water\par Also started PPI as per GI who he saw in March\par Some improvement in drip, coughing less\par No nasal congestion, anterior rhinorrhea, facial pain/pressure, good sense of smell\par \par PMH: DM, HTN, arthritis\par Current everyday smoker

## 2022-06-14 ENCOUNTER — APPOINTMENT (OUTPATIENT)
Dept: ORTHOPEDIC SURGERY | Facility: CLINIC | Age: 57
End: 2022-06-14
Payer: MEDICARE

## 2022-06-14 VITALS — WEIGHT: 250 LBS | HEIGHT: 69 IN | BODY MASS INDEX: 37.03 KG/M2

## 2022-06-14 PROCEDURE — 99214 OFFICE O/P EST MOD 30 MIN: CPT

## 2022-06-14 NOTE — DISCUSSION/SUMMARY
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 The patella has a convex v shaped joint surface and the trochlea a matching concave v shape. The patella runs in the trochlea valley as the knee bends, increasing the leverage and allowing knee range of motion. However the patellofemoral joint experiences 20-50x the body weight in force when going up and down stairs during mid-flexion of the knee. The joint is most symptomatic with prolonged knee flexion or going up/down stairs. The treatment for this problem is predominantly non-operative consisting of patient education, activity modification, physical therapy with focus on VMO strengthening, oral and topical non-steroidal anti-inflammatories, knee braces including lateral J/shield's brace or infrapatellar band, and corticosteroid/viscosupplementation injection. The symptoms are chronic and are difficult to treat. In cases of failure to respond to non-operative management surgical options can be considered.\par \par The patient was prescribed Diclofenac PO non-steroidal anti-inflammatory medication. 50mg tablets twice daily to be taken for at least 1-2 weeks in a row and then PRN afterwards. Risks and benefits were discussed and include but not limited to renal damage and GI ulceration and bleeding.  They were advised to take with food to limit stomach upset as well as warned to stop the medication if worsening gastric pain or dizziness or other side effects. Also to immediately stop the medication and seek appriate medical attention if any severe stomach ache, gastritis, black/red vomit, black/red stools or any other medical concern.\par \par Due to failure of prior non-operative modalities of treatment including physical therapy, activity modification, non-steroidal anti-inflammatory medications, and weight-loss with failure of multiple prior corticosteroid injections without appropriate improvement in symptoms and concern for too many frequent injections causing increased risk for adverse events, I am ordering a time release cortisone injection Zilretta right knee and will obtain insurance authorization. The patient will be contacted by our authorization department over its status, copayment and when available for injection.\par \par the patient desires medication stronger than nsaids, referral for pain management provided.\par \par patient declined mri of right knee\par \par The patient verifies their understanding the the visit, diagnosis and plan. They agree with the treatment plan and will contact the office with any questions or problems.\par \par Follow up for injections\par \par

## 2022-06-14 NOTE — HISTORY OF PRESENT ILLNESS
[de-identified] : CC Right knee\par \par HPI 55yo male presents for zilretta inj 5mo fu of acute onset of 2 months of medial left knee pain as well as several years of anterior right knee pain[without injury]. The pain is worse, and rated a #8 out of 10, described as sharp, [without radiation].  Rest makes the pain better and walking makes the pain worse. The patient reports associated symptoms of pop click grinding instability causing falls. The patient reports pain at night affecting sleep, and reports left knee pain is currently similar pain previously in the right knee prior to arthroscopic surgery however his right knee pain is different from his previous pain.\par \par The patient has tried the following treatments:\par Activity modification	+\par Ice/Compression  	+\par Braces    		-\par Nsaids    		+ diclofenac mild temporary relief\par Physical Therapy 	+ 2 mo help\par Cortisone Injection	+ 9/2021 help 2-3 weeks\par Visco Injection		-\par Zilretta			1-25-22 helped for 4 mo\par Arthroscopy		+ right knee 2017\par \par right knee pain helped 4 mo w zilretta\par wants more zilretta\par wants medicine stronger than the ones i'm giving him for pain\par \par Review of Systems is positive for the above musculoskeletal symptoms and is otherwise non-contributory for general, constitutional, psychiatric, neurologic, HEENT, cardiac, respiratory, gastrointestinal, reproductive, lymphatic, and dermatologic complaints.\par \par Consult by  [ ]

## 2022-06-14 NOTE — PHYSICAL EXAM
[de-identified] : Physical Examination\par General: well nourished, in no acute distress, alert and oriented to person, place and time\par Psychiatric: elevated mood and affect, no abnormal movements or speech patterns\par Eyes: vision intact with glasses\par \par \par Musculoskeletal Examination\par Ambulation	with antalgic gait, with assistive devices\par \par Knee			Right			Left\par General\par      Swelling/Deformity	normal			normal	\par      Skin			healed incisions			normal\par      Erythema		-			-\par      Standing Alignment	neutral			neutral\par      Effusion		none			none\par Range of Motion\par      Hip			full painless ROM		full painless ROM\par      Knee Flexion		110			120\par      Knee Extension	0			0\par Patella\par      J Sign		-			-\par      Quad Medial/Lateral	1/1 1/1\par      Apprehension		-			-\par      Nathanael's		+			-\par      Grind Sign		+			-\par      Crepitus		+			+\par Palpation\par      Medial Joint Line	-			+\par      Medial Fem Condyle Artic	-			-\par      Medial Fem Condyle Epic	-			-\par      Lateral Joint Line	-			-\par      Quad Tendon		-			-\par      Patella Tendon	-			-\par      Medial Patella		-			-\par      Lateral Patella 	+			-\par      Posterior Knee	-			-\par Ligamentous\par      Varus @ 0° / 30°	-/-			-/-\par      Valgus @ 0° / 30°	-/-			-/-\par      Lachman		-			-\par      Pivot Shift		-			-\par      Anterior Drawer	-			-\par      Posterior Drawer	-			-\par Meniscus\par      Ashwini		-			+ medial pain\par      Flexion Pinch		+ anterior pain			-\par Strength Examination/Atrophy\par      Hip Flexors 		5+			5+\par      Quadriceps		5+			5+\par      Hamstring		5+			5+\par      Tibialis Anterior	5+			5+\par      Achilles/Soleus	5+			5+\par Sensation\par      Deep Peroneal	normal			normal\par      Superficial Peroneal 	normal			normal\par      Sural  		normal			normal\par      Posterior Tibial 	normal			normal\par      Saphneous 		normal			normal\par Pulses\par      DP			2+			2+\par  [de-identified] : 4 views of the bilateral knee (standing AP, flexing standing AP, 30degree flexed lateral,  sunrise view)\par 9-1-21\par demonstrate:\par Right\par There is no narrowing\par Trace osteophytic lipping\par Trace suprapatellar effusion\par Small lateral osteophytes with trace patellofemoral joint space loss [without] evidence of tilt [or] subluxation on sunrise view\par Normal soft tissue density\par Otherwise normal osseous bone structure without fracture or dislocation\par \par Left\par There is no narrowing\par Trace osteophytic lipping\par Trace suprapatellar effusion\par Trace patellofemoral joint space loss [without] evidence of tilt [or] subluxation on sunrise view\par Normal soft tissue density\par Otherwise normal osseous bone structure without fracture or dislocation\par \par \par MRI Left knee [facility] on 9-14-21\par My impression of the images:\par Quality of the MRI is good\par Medial Meniscus ok\par Lateral Meniscus ok\par There is no chondral loss in the tri compartments\par There is [Not] bone marrow edema[/subchondral cysts] in the tri compartments\par LCL [Is] intact\par MCL [Is] intact\par ACL [Is] intact\par PCL [Is] intact \par Quadriceps Tendon [Is] intact\par Patella Tendon [Is] intact\par \par The Final Radiologist Impression:\par Joint space/synovium: There is no effusion or synovial thickening.  There is no popliteal cyst.\par \par Bones/articular cartilage: There is no fracture or bone marrow edema.  The articular cartilage is preserved.  \par \par Menisci: The medial meniscus is intact.  The lateral meniscus is intact.\par \par Ligaments/Tendons: The anterior cruciate and posterior cruciate ligaments are intact.  The medial collateral ligament is intact.  The lateral collateral ligament complex is intact.  The extensor tendon mechanism is unremarkable.\par \par IMPRESSION: Normal knee MRI\par \par \par 4 views of the right knee (standing AP, flexing standing AP, 30degree flexed lateral,  sunrise view)\par  06/14/2022,  Ordered taken and evaluated by myself today and\par demonstrate:\par Right\par There is no narrowing\par Trace osteophytic lipping\par Trace suprapatellar effusion\par Small lateral osteophytes with trace patellofemoral joint space loss [without] evidence of tilt [or] subluxation on sunrise view\par Normal soft tissue density\par Otherwise normal osseous bone structure without fracture or dislocation

## 2022-06-21 ENCOUNTER — APPOINTMENT (OUTPATIENT)
Dept: ORTHOPEDIC SURGERY | Facility: CLINIC | Age: 57
End: 2022-06-21
Payer: MEDICARE

## 2022-06-21 PROCEDURE — 20611 DRAIN/INJ JOINT/BURSA W/US: CPT | Mod: RT

## 2022-06-21 NOTE — PROCEDURE
[de-identified] : Chief complaint:     Right  knee pain\par \par Diagnosis:              Right knee osteoarthritis\par \par \par Injection:\par Zilretta x1\par [id number] 6/23\par \par \par Procedure Note:\par \par Risks and benefits of an arthrocentesis and intraarticular extended release cortisone injection of the RIGHT knee were discussed with the patient. Potential adverse effects were discussed including but not limited to bleeding, skin/ joint infection, local skin reactions including bleaching, bruising, stiffness, soreness, vasovagal episodes, transient hyperglycemia, avascular necrosis, pseudo-septic type reactions, post injection joint pain, elevation of blood glucose, allergic reaction to product or anesthetic and other rare but potential adverse effects along with benefits including decreased pain and improved stability prior to obtaining verbal informed consent. It was also discussed that for some patients the treatment is ineffective and there are no guarantees that the patient will experience improvement as the result of the injection. In rare occasions the injection can cause worsening of pain.\par \par Zilretta vials were preparred according to instructions and drawn up into a 5cc syringe.\par \par The use of a Sonosite 15-6 MHz linear transducer with live ultrasound guidance of the knee was necessary given the patient's BMI and local body habitus overlying and obscuring the accurate identification of normal body bony anatomy used to identify the injection site and the depth of soft tissue envelope necessitating a longer than normal needle to reach the joint space, and to confirm the location of the needle tip and intra-articular delivery of the medication. Without the use of live ultrasound guidance the injection would have been more difficult and place the patient's neurovascular structures at risk from the longer needle needed to traverse the soft tissue envelope.\par \par A 6 inch bolster was placed underneath the knee to place the Right knee in a slightly flexed position. The superolateral injection site was identified using the ultrasound probe to identify the suprapatellar space and superior boarder of the patella first longitudinally and then transversely. The injection site was marked and prepped with a ChloraPrep swab and anesthetized with ethylchloride skin anesthesia. Under sterile technique a 20g 3 1/2 in spinal needle with a preloaded Zilretta syringe was passed through the injection site towards the suprapatellar space under live ultrasound guidance and noted to penetrate the joint capsule. The medication was injected without resistance under live ultrasound visualization and noted to flow into the suprapatellar joint space. The injection site was sterilely dressed, there was minimal blood loss.\par \par The patient tolerated this procedure without any complications done by myself. Images were recorded and saved.\par \par The patient has been advised that if they notice any worsening of symptoms or any problems to contact me and seek care from a qualified medical professional. The patient was instructed to ice the knee and take NSAID medication on an as needed basis if the patient feels discomfort.\par \par Followup injection [6 months]

## 2022-07-05 NOTE — ED ADULT TRIAGE NOTE - AS TEMP SITE
oral Griseofulvin Pregnancy And Lactation Text: This medication is Pregnancy Category X and is known to cause serious birth defects. It is unknown if this medication is excreted in breast milk but breast feeding should be avoided.

## 2022-07-22 NOTE — ASU PATIENT PROFILE, ADULT - TEACHING/LEARNING OTHER LEARNERS
The orders are in the computer and papercopy was faxed to [facility] this week. Please contact [facility] - phone number might be on the paperwork. If there is a delay with [facility], then please fax to a different company for overnight pulseox.  Thank you.      family

## 2022-08-18 ENCOUNTER — APPOINTMENT (OUTPATIENT)
Dept: ORTHOPEDIC SURGERY | Facility: CLINIC | Age: 57
End: 2022-08-18

## 2022-09-12 ENCOUNTER — APPOINTMENT (OUTPATIENT)
Dept: ORTHOPEDIC SURGERY | Facility: CLINIC | Age: 57
End: 2022-09-12

## 2022-09-12 PROCEDURE — 99214 OFFICE O/P EST MOD 30 MIN: CPT

## 2022-09-12 PROCEDURE — 72100 X-RAY EXAM L-S SPINE 2/3 VWS: CPT

## 2022-09-12 PROCEDURE — 73502 X-RAY EXAM HIP UNI 2-3 VIEWS: CPT

## 2022-09-21 ENCOUNTER — APPOINTMENT (OUTPATIENT)
Dept: PHYSICAL MEDICINE AND REHAB | Facility: CLINIC | Age: 57
End: 2022-09-21

## 2022-09-21 VITALS
DIASTOLIC BLOOD PRESSURE: 82 MMHG | OXYGEN SATURATION: 96 % | HEART RATE: 93 BPM | TEMPERATURE: 95.2 F | SYSTOLIC BLOOD PRESSURE: 127 MMHG

## 2022-09-21 VITALS — OXYGEN SATURATION: 96 % | HEART RATE: 93 BPM

## 2022-09-21 PROCEDURE — 99203 OFFICE O/P NEW LOW 30 MIN: CPT

## 2022-09-21 NOTE — HISTORY OF PRESENT ILLNESS
[FreeTextEntry1] : RAFAT KANG is a 57 year old male who was referred to us for diagnostic hip injection by orthopedist Dr. Alvarez. Patient states he had a right KNEE sided torn meniscus which was repaired in 2017. He endorses relief for about 2 years before current pain began. Patient reports that it is excruciating and interferes with walking. He endorses associated right hip and low back pain.  He denies pain when sitting. 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 Patient reports understanding that Dr. Alvarez believes the right knee pain may in part be attributed to an issue with his right hip or spine and he is here today to address the right hip. \par \par Pain location: Right: knee, hip, low back\par Quality: stabbing\par Radiation: Right thigh\par Severity: 7/10\par Onset: [id number]\par Associated symptoms: denies\par Numbness: denies\par Weakness: due to pain\par Exacerbated by: weight bearing, positional\par Improved by: sitting\par \par Denies bowel/bladder dysfunction, saddle anesthesia, fevers, chills, weight loss, night pain, or night sweats at this time.\par \par The pain interferes with function, ADLs and quality of life.\par Patient had tried Acetaminophen, NSAIDs, prescription pain medications, muscle relaxants without any lasting relief of pain.\par \par Patient had imaging studies to evaluate the pain but reports are being faxed to our office.\par
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
## 2022-09-21 NOTE — REVIEW OF SYSTEMS
[Joint Pain] : joint pain [Negative] : Integumentary [FreeTextEntry9] : R knee pain, hip pain, low back pain

## 2022-09-21 NOTE — ASSESSMENT
[FreeTextEntry1] : 57 year old male with right sided chronic knee pain but also right-sided hip and back pain.  The back and hip pains are confounding his right knee pain issues. \par \par Patient reassured and educated on the diagnosis and treatment options.\par \par Dr. Alvarez's note from September 12, 2022 reviewed\par \par Requested imaging of the right knee hip and back from Ortho joint Dr. Alvarez's office\par \par Patient would like to have the right hip intra-articular diagnostic and therapeutic steroid injection\par \par Follow-up 2 weeks after injection to assess efficacy and diagnosis. \par Patient to follow-up with ortho as scheduled by Dr. Alvarez\par \par Patient had tried and failed conservative treatment. This includes Acetaminophen, NSAIDs, muscle relaxants, neuropathic medications and PT for at least 8 weeks. After a thorough discussion of risks and benefits patient would like to proceed with right hip intra-articular steroid injection with fluoroscopic guidance. \par \par  \par Based on history, physical exam and diagnostic findings, patient will benefit from this procedure. The goal of this procedure is to reduce pain and inflammation, improve function and range of motion and to further promote increased activity and return to previous level of functioning. \par \par Asking for authorization for right hip intra-articular steroid injection with fluoroscopic guidance to help treat patient's pain. \par \par This note was generated using Dragon medical dictation software. A reasonable effort had been made for proofreading its contents, but spelling mistakes or grammatical errors may still remain. If there are any questions or points of clarification needed please notify my office.\par

## 2022-09-22 ENCOUNTER — TRANSCRIPTION ENCOUNTER (OUTPATIENT)
Age: 57
End: 2022-09-22

## 2022-09-30 ENCOUNTER — APPOINTMENT (OUTPATIENT)
Dept: PHYSICAL MEDICINE AND REHAB | Facility: CLINIC | Age: 57
End: 2022-09-30

## 2022-09-30 ENCOUNTER — OUTPATIENT (OUTPATIENT)
Dept: OUTPATIENT SERVICES | Facility: HOSPITAL | Age: 57
LOS: 1 days | End: 2022-09-30
Payer: COMMERCIAL

## 2022-09-30 DIAGNOSIS — Z98.890 OTHER SPECIFIED POSTPROCEDURAL STATES: Chronic | ICD-10-CM

## 2022-09-30 DIAGNOSIS — M17.11 UNILATERAL PRIMARY OSTEOARTHRITIS, RIGHT KNEE: ICD-10-CM

## 2022-09-30 PROCEDURE — 20610 DRAIN/INJ JOINT/BURSA W/O US: CPT | Mod: RT

## 2022-09-30 PROCEDURE — 20610 DRAIN/INJ JOINT/BURSA W/O US: CPT

## 2022-09-30 PROCEDURE — 77002 NEEDLE LOCALIZATION BY XRAY: CPT | Mod: 26

## 2022-10-03 DIAGNOSIS — M25.561 PAIN IN RIGHT KNEE: ICD-10-CM

## 2022-10-14 ENCOUNTER — APPOINTMENT (OUTPATIENT)
Dept: PHYSICAL MEDICINE AND REHAB | Facility: CLINIC | Age: 57
End: 2022-10-14

## 2022-10-14 ENCOUNTER — APPOINTMENT (OUTPATIENT)
Dept: ORTHOPEDIC SURGERY | Facility: CLINIC | Age: 57
End: 2022-10-14

## 2022-10-14 VITALS
DIASTOLIC BLOOD PRESSURE: 78 MMHG | SYSTOLIC BLOOD PRESSURE: 115 MMHG | HEIGHT: 69 IN | WEIGHT: 250 LBS | BODY MASS INDEX: 37.03 KG/M2

## 2022-10-14 VITALS — HEART RATE: 91 BPM | DIASTOLIC BLOOD PRESSURE: 71 MMHG | SYSTOLIC BLOOD PRESSURE: 112 MMHG | OXYGEN SATURATION: 99 %

## 2022-10-14 DIAGNOSIS — M22.41 CHONDROMALACIA PATELLAE, RIGHT KNEE: ICD-10-CM

## 2022-10-14 DIAGNOSIS — M47.816 SPONDYLOSIS W/OUT MYELOPATHY OR RADICULOPATHY, LUMBAR REGION: ICD-10-CM

## 2022-10-14 PROCEDURE — 99213 OFFICE O/P EST LOW 20 MIN: CPT

## 2022-10-14 NOTE — PHYSICAL EXAM
[FreeTextEntry1] : General exam \par \par Constitutional: The patient appears well-developed, well-nourished, and in no apparent distress. Patient is well-groomed.  \par \par Skin: The skin is warm and dry, with normal turgor.  No rashes or lesions are noted.  \par \par Eyes: PERRL.  \par \par ENMT: Ears: Hearing is grossly within normal limits.  \par \par Neck: Supple: The neck is supple.  \par \par Respiratory: Inspection: Breathing unlabored.  \par \par Neurologic: Alert and oriented x 3. \par \par Psychiatric: Patient is cooperative and appropriate.  Mood and affect are normal.  Patient's insight is good, and memory and judgment are intact.\par \par

## 2022-10-14 NOTE — HISTORY OF PRESENT ILLNESS
[FreeTextEntry1] : Patient is s/p right hip steroid injection with fluoroscopic guidance on 9/30/22. Today patient is here for follow up. Patient reports greater than 80% reduction in pain as tested by the VAS pain scale. Patient also reports improvement in quality of life. In addition, patient reports improvement of function and ADLs. He is happy that his right hip pain has resolved. He is now c/o right knee pains that are more bothersome now as he notices it more.\par \par Denies any new pain, numbness or weakness, bowel/bladder dysfunction, saddle anesthesia, fevers, chills, weight loss, night pain, or night sweats at this time.

## 2022-10-14 NOTE — ASSESSMENT
[FreeTextEntry1] : 57 year old male with right sided chronic knee pain but also right-sided hip and back pain. Patient notes improvement of his right hip pain after injection. He has follow up with Dr. Alvarez at 2pm today. He is now reporting that his right knee is bothering him.\par \par Patient reassured and educated on the diagnosis and treatment options.\par \par Patient to follow-up with Dr. Alvarez today\par Consider right knee steroid injections in the future as needed\par Follow up PRN\par \par This note was generated using Dragon medical dictation software. A reasonable effort had been made for proofreading its contents, but spelling mistakes or grammatical errors may still remain. If there are any questions or points of clarification needed please notify my office.

## 2022-11-02 ENCOUNTER — APPOINTMENT (OUTPATIENT)
Dept: ORTHOPEDIC SURGERY | Facility: CLINIC | Age: 57
End: 2022-11-02

## 2022-11-02 VITALS
DIASTOLIC BLOOD PRESSURE: 81 MMHG | SYSTOLIC BLOOD PRESSURE: 120 MMHG | WEIGHT: 250 LBS | HEIGHT: 69 IN | BODY MASS INDEX: 37.03 KG/M2

## 2022-11-02 DIAGNOSIS — M54.2 CERVICALGIA: ICD-10-CM

## 2022-11-02 PROCEDURE — 99214 OFFICE O/P EST MOD 30 MIN: CPT

## 2022-11-02 PROCEDURE — 72050 X-RAY EXAM NECK SPINE 4/5VWS: CPT

## 2022-11-02 NOTE — HISTORY OF PRESENT ILLNESS
[de-identified] : This is a 57-year-old male here today for evaluation of his neck pain.  He denies any severe radicular type pain.  He does state that the symptoms can be disabling at times.  He has pain with range of motion of his neck.  He denies any issues with balance or hand dexterity.  He denies any bowel bladder issues.  He denies any saddle anesthesia.  He does have some fairly significant right hip pain which is being followed by [facility].

## 2022-11-02 NOTE — PHYSICAL EXAM
[de-identified] : Cervical Physical Exam\par \par Gait - Normal\par \par Station - Normal\par \par Sagittal balance - Normal \par \par Compensatory mechanism? - None\par \par Horizontal gaze - Maintained\par \par Heel walk - Normal\par \par Toe walk - Normal\par \par Reflexes\par Biceps - Normal\par Triceps - Normal\par Brachioradialis - Normal\par Patellar - Normal\par Gastroc - Normal\par Clonus -No\par \par Gracia´s - None\par \par Shoulder Exam - Normal\par \par Spurling´s - None\par \par Wrist Pulses -2+ radial/ulnar\par \par Foot Pulses -2+ DP/PT\par \par Cervical range of motion - Normal\par \par Sensation \par C5-T1 sensation intact to light touch bilaterally\par \par L1-S1 sensation intact to light touch bilaterally\par \par Motor\par \par \par 	Deltoid	Biceps	Triceps	WF	WE	IO	\par Right	5/5	5/5	5/5	5/5	5/5	5/5	5/5\par Left	5/5	5/5	5/5	5/5	5/5	5/5	5/5\par \par \par 	IP	Quad	HS	TA	Gastroc	EHL\par Right	5/5	5/5	5/5	5/5	5/5	5/5\par Left	5/5	5/5	5/5	5/5	5/5	5/5 [de-identified] : Cervical radiographs\par Disc degeneration noted\par Facet arthropathy noted

## 2022-11-05 ENCOUNTER — OUTPATIENT (OUTPATIENT)
Dept: OUTPATIENT SERVICES | Facility: HOSPITAL | Age: 57
LOS: 1 days | End: 2022-11-05
Payer: COMMERCIAL

## 2022-11-05 ENCOUNTER — APPOINTMENT (OUTPATIENT)
Dept: MRI IMAGING | Facility: IMAGING CENTER | Age: 57
End: 2022-11-05

## 2022-11-05 DIAGNOSIS — M23.91 UNSPECIFIED INTERNAL DERANGEMENT OF RIGHT KNEE: ICD-10-CM

## 2022-11-05 DIAGNOSIS — Z98.890 OTHER SPECIFIED POSTPROCEDURAL STATES: Chronic | ICD-10-CM

## 2022-11-05 DIAGNOSIS — Z00.8 ENCOUNTER FOR OTHER GENERAL EXAMINATION: ICD-10-CM

## 2022-11-05 PROCEDURE — 73721 MRI JNT OF LWR EXTRE W/O DYE: CPT

## 2022-11-05 PROCEDURE — 73721 MRI JNT OF LWR EXTRE W/O DYE: CPT | Mod: 26,RT

## 2022-11-08 ENCOUNTER — APPOINTMENT (OUTPATIENT)
Dept: OTOLARYNGOLOGY | Facility: CLINIC | Age: 57
End: 2022-11-08

## 2022-11-08 VITALS — WEIGHT: 250 LBS | HEIGHT: 69 IN | BODY MASS INDEX: 37.03 KG/M2

## 2022-11-08 PROCEDURE — 99214 OFFICE O/P EST MOD 30 MIN: CPT | Mod: 25

## 2022-11-08 PROCEDURE — 31231 NASAL ENDOSCOPY DX: CPT

## 2022-11-08 NOTE — HISTORY OF PRESENT ILLNESS
[de-identified] : 57 year old Man with hx of LPRD presents for follow-up for rhinitis\par History of current smoker - 1/2ppd for about 40 years\par LCV 4/27/22 at which time recommended to continue PPI medication and maintain reflux precautions\par Using nasal Saline intermittently, subsequent headaches\par Reports constant coughing with yellow sputum produced - thick consistency\par Denies hemoptysis, nasal congestion, anterior rhinorrhea or PND, facial pain and pressure, poor sense of smell\par No recent sinus infections or recent fevers

## 2022-11-14 ENCOUNTER — APPOINTMENT (OUTPATIENT)
Dept: ORTHOPEDIC SURGERY | Facility: CLINIC | Age: 57
End: 2022-11-14

## 2022-11-14 VITALS — OXYGEN SATURATION: 95 % | HEART RATE: 70 BPM | SYSTOLIC BLOOD PRESSURE: 122 MMHG | DIASTOLIC BLOOD PRESSURE: 80 MMHG

## 2022-11-14 PROCEDURE — 99212 OFFICE O/P EST SF 10 MIN: CPT

## 2022-11-17 RX ORDER — HYLAN G-F 20 16MG/2ML
16 SYRINGE (ML) INTRAARTICULAR
Qty: 1 | Refills: 0 | Status: ACTIVE | COMMUNITY
Start: 2022-11-17

## 2022-11-22 ENCOUNTER — NON-APPOINTMENT (OUTPATIENT)
Age: 57
End: 2022-11-22

## 2023-01-06 ENCOUNTER — APPOINTMENT (OUTPATIENT)
Dept: ORTHOPEDIC SURGERY | Facility: CLINIC | Age: 58
End: 2023-01-06
Payer: MEDICARE

## 2023-01-06 PROCEDURE — 20610 DRAIN/INJ JOINT/BURSA W/O US: CPT

## 2023-01-19 ENCOUNTER — APPOINTMENT (OUTPATIENT)
Dept: ORTHOPEDIC SURGERY | Facility: CLINIC | Age: 58
End: 2023-01-19
Payer: MEDICARE

## 2023-01-19 VITALS — HEIGHT: 69 IN | WEIGHT: 241 LBS | BODY MASS INDEX: 35.7 KG/M2

## 2023-01-19 VITALS — HEART RATE: 82 BPM | SYSTOLIC BLOOD PRESSURE: 136 MMHG | DIASTOLIC BLOOD PRESSURE: 80 MMHG

## 2023-01-19 PROCEDURE — 20610 DRAIN/INJ JOINT/BURSA W/O US: CPT

## 2023-01-24 ENCOUNTER — APPOINTMENT (OUTPATIENT)
Dept: OTOLARYNGOLOGY | Facility: CLINIC | Age: 58
End: 2023-01-24
Payer: MEDICARE

## 2023-01-24 DIAGNOSIS — J31.0 CHRONIC RHINITIS: ICD-10-CM

## 2023-01-24 PROCEDURE — 31231 NASAL ENDOSCOPY DX: CPT

## 2023-01-24 PROCEDURE — 99213 OFFICE O/P EST LOW 20 MIN: CPT | Mod: 25

## 2023-01-24 RX ORDER — AZELASTINE HYDROCHLORIDE 137 UG/1
137 SPRAY, METERED NASAL
Qty: 1 | Refills: 4 | Status: ACTIVE | COMMUNITY
Start: 2023-01-24 | End: 1900-01-01

## 2023-01-24 NOTE — HISTORY OF PRESENT ILLNESS
[de-identified] : 57 year old male hx rhinitis, LPR presents for follow up\par History of current smoker - 1/2ppd for about 40 years\par LCV 11/08/22 at which time restarted on Flonase, ordered for Chest xray\par Using Flonase daily with improvement in nasal congestion \par Has not done chest xray yet- scheduled to see pulmonologist next month \par Frequent coughing with yellow/green thick phlegm \par No recent nasal congestion, anterior rhinorrhea, PND, facial pain/pressure\par Sense of smell is good

## 2023-01-24 NOTE — PHYSICAL EXAM
[Nasal Endoscopy Performed] : nasal endoscopy was performed, see procedure section for findings [Midline] : trachea located in midline position [Edentulous] : edentulous [Normal] : no rashes

## 2023-01-27 ENCOUNTER — APPOINTMENT (OUTPATIENT)
Dept: ORTHOPEDIC SURGERY | Facility: CLINIC | Age: 58
End: 2023-01-27
Payer: MEDICARE

## 2023-01-27 VITALS
DIASTOLIC BLOOD PRESSURE: 83 MMHG | HEIGHT: 69 IN | WEIGHT: 241 LBS | BODY MASS INDEX: 35.7 KG/M2 | SYSTOLIC BLOOD PRESSURE: 140 MMHG

## 2023-01-27 DIAGNOSIS — M17.11 UNILATERAL PRIMARY OSTEOARTHRITIS, RIGHT KNEE: ICD-10-CM

## 2023-01-27 PROCEDURE — 20610 DRAIN/INJ JOINT/BURSA W/O US: CPT

## 2023-02-07 ENCOUNTER — APPOINTMENT (OUTPATIENT)
Dept: PHYSICAL MEDICINE AND REHAB | Facility: CLINIC | Age: 58
End: 2023-02-07
Payer: MEDICARE

## 2023-02-07 VITALS — DIASTOLIC BLOOD PRESSURE: 76 MMHG | HEART RATE: 66 BPM | SYSTOLIC BLOOD PRESSURE: 120 MMHG | OXYGEN SATURATION: 83 %

## 2023-02-07 PROCEDURE — 99214 OFFICE O/P EST MOD 30 MIN: CPT

## 2023-02-07 NOTE — REVIEW OF SYSTEMS
[Chest Pain] : no chest pain [Shortness Of Breath] : no shortness of breath [Negative] : Constitutional [FreeTextEntry9] : hip, knee, back pains

## 2023-02-07 NOTE — ASSESSMENT
[FreeTextEntry1] : Mr. RAFAT KANG is a 57 year M with pain in the right hip. He reports chronic long standing pain and is noting an acute on chronic exacerbation of this pain due to hip osteoarthritis.\par \par Patient reassured and educated on the diagnosis and treatment options. Risks and benefits of treatment and of delaying treatment discussed with patient. Risks discussed include but not limited to: progression of symptoms, worsening pain and functional status, etc.\par \par Sending for XR bilateral hips to evaluate for DJD progression\par \par Dr. Alvarez's note from 11/14/22 reviewed\par Continue follow up with Dr. Alvarez for knee pains\par \par Patient had tried and failed conservative treatment. This includes but is not limited to: Acetaminophen, NSAIDs, muscle relaxants, neuropathic medications, physician directed home exercises and/or physical therapy for at least 8 weeks. After a thorough discussion of risks and benefits patient would like to proceed with RIGHT HIP INTRAARTICULAR STEROID INJECTION WITH FLUOROSCOPIC GUIDANCE\par \par Risks and benefits of having injection discussed with patient. Risks discussed included, but not limited to: pain, infection, bleeding requiring emergency surgery, headaches, damage to vital organs, etc.\par \par Based on the history, physical exam and diagnostic findings, patient will benefit from this procedure. The goal of this procedure is to reduce pain and inflammation, improve function and range of motion and to further promote increased activity and return to previous level of functioning. The ultimate goal of performing this procedure is to improve patient's quality of life.\par \par Asking for authorization for RIGHT HIP INTRAARTICULAR STEROID INJECTION WITH FLUOROSCOPIC GUIDANCE to help treat patient´s pain.  Patient will be scheduled for this procedure.\par \par Patient was advised if the following symptoms develop: chills, fever, loss of bladder control, bowel incontinence or urinary retention, numbness/tingling or weakness is present in upper or lower extremities, to go to the nearest emergency room. This may be a new clinical condition not present at the time of the patient visit that may lead to paralysis and/or death. Patient advised if the above symptoms developed to also call the office immediately to inform us and to go to the nearest emergency room.\par \par This note was generated using Dragon medical dictation software. A reasonable effort had been made for proofreading its contents, but spelling mistakes or grammatical errors may still remain. If there are any questions or points of clarification needed please notify my office.

## 2023-02-07 NOTE — PHYSICAL EXAM
[FreeTextEntry1] : General exam \par \par Constitutional: The patient appears well-developed, well-nourished, and in no apparent distress. Patient is well-groomed.  \par \par Skin: The skin is warm and dry, with normal turgor.  No rashes or lesions are noted.  \par \par Eyes: PERRL.  \par \par ENMT: Ears: Hearing is grossly within normal limits.  \par \par Neck: Supple: The neck is supple.  \par \par Respiratory: Inspection: Breathing unlabored.  \par \par Neurologic: Alert and oriented x 3. \par \par Psychiatric: Patient is cooperative and appropriate.  Mood and affect are normal.  Patient's insight is good, and memory and judgment are intact.\par \par HIP EXAM\par \par APPEARANCE:\par No visible scars, moles or discolorations\par No gross deformity or malalignment\par No erythema, swelling or ecchymosis\par \par SPECIAL TESTS:\par Normal left straight leg raising test\par Normal right straight leg raising test\par FABERE left normal\par FABERE right +\par FADIR left normal\par FADIR right +\par Gaenslen's test left normal\par Gaenslen's test right +\par \par GAIT:\par +antalgic gait

## 2023-02-07 NOTE — HISTORY OF PRESENT ILLNESS
[FreeTextEntry1] : RAFAT KANG is s/p right hip steroid injection with fluoroscopic guidance on 9/30/22. He reported over 80% reduction in pain as tested by the VAS pain scale that lasted over 3 months. However the pain has now returned and he wishes to repeat the injection. He has been seeing Dr. Antonio lema for his knee pains.\par \par Denies any new pain, numbness or weakness, bowel/bladder dysfunction, saddle anesthesia, fevers, chills, weight loss, night pain, or night sweats at this time.

## 2023-02-10 ENCOUNTER — EMERGENCY (EMERGENCY)
Facility: HOSPITAL | Age: 58
LOS: 1 days | Discharge: HOME | End: 2023-02-10
Attending: EMERGENCY MEDICINE | Admitting: EMERGENCY MEDICINE
Payer: MEDICARE

## 2023-02-10 ENCOUNTER — APPOINTMENT (OUTPATIENT)
Dept: RADIOLOGY | Facility: IMAGING CENTER | Age: 58
End: 2023-02-10
Payer: MEDICARE

## 2023-02-10 ENCOUNTER — OUTPATIENT (OUTPATIENT)
Dept: OUTPATIENT SERVICES | Facility: HOSPITAL | Age: 58
LOS: 1 days | End: 2023-02-10
Payer: COMMERCIAL

## 2023-02-10 VITALS
TEMPERATURE: 98 F | DIASTOLIC BLOOD PRESSURE: 69 MMHG | RESPIRATION RATE: 16 BRPM | SYSTOLIC BLOOD PRESSURE: 112 MMHG | OXYGEN SATURATION: 100 % | HEART RATE: 92 BPM

## 2023-02-10 DIAGNOSIS — Z98.890 OTHER SPECIFIED POSTPROCEDURAL STATES: Chronic | ICD-10-CM

## 2023-02-10 DIAGNOSIS — M16.11 UNILATERAL PRIMARY OSTEOARTHRITIS, RIGHT HIP: ICD-10-CM

## 2023-02-10 LAB
A1C WITH ESTIMATED AVERAGE GLUCOSE RESULT: 10.8 % — HIGH (ref 4–5.6)
ALBUMIN SERPL ELPH-MCNC: 4.3 G/DL — SIGNIFICANT CHANGE UP (ref 3.3–5)
ALP SERPL-CCNC: 100 U/L — SIGNIFICANT CHANGE UP (ref 40–120)
ALT FLD-CCNC: 16 U/L — SIGNIFICANT CHANGE UP (ref 4–41)
ANION GAP SERPL CALC-SCNC: 10 MMOL/L — SIGNIFICANT CHANGE UP (ref 7–14)
APPEARANCE UR: ABNORMAL
APTT BLD: 29 SEC — SIGNIFICANT CHANGE UP (ref 27–36.3)
AST SERPL-CCNC: 11 U/L — SIGNIFICANT CHANGE UP (ref 4–40)
BACTERIA # UR AUTO: NEGATIVE — SIGNIFICANT CHANGE UP
BASE EXCESS BLDV CALC-SCNC: 3.2 MMOL/L — HIGH (ref -2–3)
BASOPHILS # BLD AUTO: 0.02 K/UL — SIGNIFICANT CHANGE UP (ref 0–0.2)
BASOPHILS NFR BLD AUTO: 0.2 % — SIGNIFICANT CHANGE UP (ref 0–2)
BILIRUB SERPL-MCNC: 0.4 MG/DL — SIGNIFICANT CHANGE UP (ref 0.2–1.2)
BILIRUB UR-MCNC: NEGATIVE — SIGNIFICANT CHANGE UP
BLOOD GAS VENOUS COMPREHENSIVE RESULT: SIGNIFICANT CHANGE UP
BUN SERPL-MCNC: 42 MG/DL — HIGH (ref 7–23)
CALCIUM SERPL-MCNC: 10.5 MG/DL — SIGNIFICANT CHANGE UP (ref 8.4–10.5)
CHLORIDE BLDV-SCNC: 99 MMOL/L — SIGNIFICANT CHANGE UP (ref 96–108)
CHLORIDE SERPL-SCNC: 98 MMOL/L — SIGNIFICANT CHANGE UP (ref 98–107)
CO2 BLDV-SCNC: 30.6 MMOL/L — HIGH (ref 22–26)
CO2 SERPL-SCNC: 27 MMOL/L — SIGNIFICANT CHANGE UP (ref 22–31)
COLOR SPEC: YELLOW — SIGNIFICANT CHANGE UP
CREAT SERPL-MCNC: 1.36 MG/DL — HIGH (ref 0.5–1.3)
DIFF PNL FLD: ABNORMAL
EGFR: 61 ML/MIN/1.73M2 — SIGNIFICANT CHANGE UP
EOSINOPHIL # BLD AUTO: 0.06 K/UL — SIGNIFICANT CHANGE UP (ref 0–0.5)
EOSINOPHIL NFR BLD AUTO: 0.6 % — SIGNIFICANT CHANGE UP (ref 0–6)
EPI CELLS # UR: 1 /HPF — SIGNIFICANT CHANGE UP (ref 0–5)
ESTIMATED AVERAGE GLUCOSE: 263 — SIGNIFICANT CHANGE UP
FLUAV AG NPH QL: SIGNIFICANT CHANGE UP
FLUBV AG NPH QL: SIGNIFICANT CHANGE UP
GAS PNL BLDV: 133 MMOL/L — LOW (ref 136–145)
GLUCOSE BLDV-MCNC: 273 MG/DL — HIGH (ref 70–99)
GLUCOSE SERPL-MCNC: 261 MG/DL — HIGH (ref 70–99)
GLUCOSE UR QL: ABNORMAL
HCO3 BLDV-SCNC: 29 MMOL/L — SIGNIFICANT CHANGE UP (ref 22–29)
HCT VFR BLD CALC: 41.3 % — SIGNIFICANT CHANGE UP (ref 39–50)
HCT VFR BLDA CALC: 42 % — SIGNIFICANT CHANGE UP (ref 39–51)
HGB BLD CALC-MCNC: 13.9 G/DL — SIGNIFICANT CHANGE UP (ref 13–17)
HGB BLD-MCNC: 13.8 G/DL — SIGNIFICANT CHANGE UP (ref 13–17)
HYALINE CASTS # UR AUTO: 2 /LPF — SIGNIFICANT CHANGE UP (ref 0–7)
IANC: 6.24 K/UL — SIGNIFICANT CHANGE UP (ref 1.8–7.4)
IMM GRANULOCYTES NFR BLD AUTO: 0.6 % — SIGNIFICANT CHANGE UP (ref 0–0.9)
INR BLD: 1.02 RATIO — SIGNIFICANT CHANGE UP (ref 0.88–1.16)
KETONES UR-MCNC: NEGATIVE — SIGNIFICANT CHANGE UP
LACTATE BLDV-MCNC: 1.1 MMOL/L — SIGNIFICANT CHANGE UP (ref 0.5–2)
LEUKOCYTE ESTERASE UR-ACNC: ABNORMAL
LYMPHOCYTES # BLD AUTO: 2.81 K/UL — SIGNIFICANT CHANGE UP (ref 1–3.3)
LYMPHOCYTES # BLD AUTO: 28.1 % — SIGNIFICANT CHANGE UP (ref 13–44)
MCHC RBC-ENTMCNC: 30.1 PG — SIGNIFICANT CHANGE UP (ref 27–34)
MCHC RBC-ENTMCNC: 33.4 GM/DL — SIGNIFICANT CHANGE UP (ref 32–36)
MCV RBC AUTO: 90 FL — SIGNIFICANT CHANGE UP (ref 80–100)
MONOCYTES # BLD AUTO: 0.81 K/UL — SIGNIFICANT CHANGE UP (ref 0–0.9)
MONOCYTES NFR BLD AUTO: 8.1 % — SIGNIFICANT CHANGE UP (ref 2–14)
NEUTROPHILS # BLD AUTO: 6.24 K/UL — SIGNIFICANT CHANGE UP (ref 1.8–7.4)
NEUTROPHILS NFR BLD AUTO: 62.4 % — SIGNIFICANT CHANGE UP (ref 43–77)
NITRITE UR-MCNC: NEGATIVE — SIGNIFICANT CHANGE UP
NRBC # BLD: 0 /100 WBCS — SIGNIFICANT CHANGE UP (ref 0–0)
NRBC # FLD: 0 K/UL — SIGNIFICANT CHANGE UP (ref 0–0)
PCO2 BLDV: 48 MMHG — SIGNIFICANT CHANGE UP (ref 42–55)
PH BLDV: 7.39 — SIGNIFICANT CHANGE UP (ref 7.32–7.43)
PH UR: 6 — SIGNIFICANT CHANGE UP (ref 5–8)
PLATELET # BLD AUTO: 173 K/UL — SIGNIFICANT CHANGE UP (ref 150–400)
PO2 BLDV: 29 MMHG — SIGNIFICANT CHANGE UP (ref 25–45)
POTASSIUM BLDV-SCNC: 4.3 MMOL/L — SIGNIFICANT CHANGE UP (ref 3.5–5.1)
POTASSIUM SERPL-MCNC: 4.6 MMOL/L — SIGNIFICANT CHANGE UP (ref 3.5–5.3)
POTASSIUM SERPL-SCNC: 4.6 MMOL/L — SIGNIFICANT CHANGE UP (ref 3.5–5.3)
PROT SERPL-MCNC: 7.7 G/DL — SIGNIFICANT CHANGE UP (ref 6–8.3)
PROT UR-MCNC: ABNORMAL
PROTHROM AB SERPL-ACNC: 11.8 SEC — SIGNIFICANT CHANGE UP (ref 10.5–13.4)
RBC # BLD: 4.59 M/UL — SIGNIFICANT CHANGE UP (ref 4.2–5.8)
RBC # FLD: 12.7 % — SIGNIFICANT CHANGE UP (ref 10.3–14.5)
RBC CASTS # UR COMP ASSIST: 3 /HPF — SIGNIFICANT CHANGE UP (ref 0–4)
RSV RNA NPH QL NAA+NON-PROBE: SIGNIFICANT CHANGE UP
SAO2 % BLDV: 42.3 % — LOW (ref 67–88)
SARS-COV-2 RNA SPEC QL NAA+PROBE: SIGNIFICANT CHANGE UP
SODIUM SERPL-SCNC: 135 MMOL/L — SIGNIFICANT CHANGE UP (ref 135–145)
SP GR SPEC: 1.02 — SIGNIFICANT CHANGE UP (ref 1.01–1.05)
UROBILINOGEN FLD QL: SIGNIFICANT CHANGE UP
WBC # BLD: 10 K/UL — SIGNIFICANT CHANGE UP (ref 3.8–10.5)
WBC # FLD AUTO: 10 K/UL — SIGNIFICANT CHANGE UP (ref 3.8–10.5)
WBC UR QL: 119 /HPF — HIGH (ref 0–5)

## 2023-02-10 PROCEDURE — 73521 X-RAY EXAM HIPS BI 2 VIEWS: CPT | Mod: 26

## 2023-02-10 PROCEDURE — 99223 1ST HOSP IP/OBS HIGH 75: CPT

## 2023-02-10 PROCEDURE — 73521 X-RAY EXAM HIPS BI 2 VIEWS: CPT

## 2023-02-10 PROCEDURE — 71046 X-RAY EXAM CHEST 2 VIEWS: CPT | Mod: 26

## 2023-02-10 RX ORDER — LOSARTAN POTASSIUM 100 MG/1
100 TABLET, FILM COATED ORAL DAILY
Refills: 0 | Status: DISCONTINUED | OUTPATIENT
Start: 2023-02-11 | End: 2023-02-14

## 2023-02-10 RX ORDER — DEXTROSE 50 % IN WATER 50 %
15 SYRINGE (ML) INTRAVENOUS ONCE
Refills: 0 | Status: DISCONTINUED | OUTPATIENT
Start: 2023-02-10 | End: 2023-02-14

## 2023-02-10 RX ORDER — INSULIN GLARGINE 100 [IU]/ML
22 INJECTION, SOLUTION SUBCUTANEOUS AT BEDTIME
Refills: 0 | Status: DISCONTINUED | OUTPATIENT
Start: 2023-02-10 | End: 2023-02-14

## 2023-02-10 RX ORDER — GLUCAGON INJECTION, SOLUTION 0.5 MG/.1ML
1 INJECTION, SOLUTION SUBCUTANEOUS ONCE
Refills: 0 | Status: DISCONTINUED | OUTPATIENT
Start: 2023-02-10 | End: 2023-02-14

## 2023-02-10 RX ORDER — DEXTROSE 50 % IN WATER 50 %
25 SYRINGE (ML) INTRAVENOUS ONCE
Refills: 0 | Status: DISCONTINUED | OUTPATIENT
Start: 2023-02-10 | End: 2023-02-14

## 2023-02-10 RX ORDER — INSULIN LISPRO 100/ML
6 VIAL (ML) SUBCUTANEOUS
Refills: 0 | Status: DISCONTINUED | OUTPATIENT
Start: 2023-02-11 | End: 2023-02-14

## 2023-02-10 RX ORDER — ATORVASTATIN CALCIUM 80 MG/1
40 TABLET, FILM COATED ORAL DAILY
Refills: 0 | Status: DISCONTINUED | OUTPATIENT
Start: 2023-02-11 | End: 2023-02-14

## 2023-02-10 RX ORDER — SODIUM CHLORIDE 9 MG/ML
1000 INJECTION, SOLUTION INTRAVENOUS
Refills: 0 | Status: DISCONTINUED | OUTPATIENT
Start: 2023-02-10 | End: 2023-02-14

## 2023-02-10 RX ORDER — DEXTROSE 50 % IN WATER 50 %
12.5 SYRINGE (ML) INTRAVENOUS ONCE
Refills: 0 | Status: DISCONTINUED | OUTPATIENT
Start: 2023-02-10 | End: 2023-02-14

## 2023-02-10 RX ORDER — CEFTRIAXONE 500 MG/1
1000 INJECTION, POWDER, FOR SOLUTION INTRAMUSCULAR; INTRAVENOUS ONCE
Refills: 0 | Status: COMPLETED | OUTPATIENT
Start: 2023-02-10 | End: 2023-02-10

## 2023-02-10 RX ORDER — SODIUM CHLORIDE 9 MG/ML
1000 INJECTION INTRAMUSCULAR; INTRAVENOUS; SUBCUTANEOUS ONCE
Refills: 0 | Status: COMPLETED | OUTPATIENT
Start: 2023-02-10 | End: 2023-02-10

## 2023-02-10 RX ORDER — KETOROLAC TROMETHAMINE 30 MG/ML
30 SYRINGE (ML) INJECTION ONCE
Refills: 0 | Status: DISCONTINUED | OUTPATIENT
Start: 2023-02-10 | End: 2023-02-10

## 2023-02-10 RX ORDER — CEFTRIAXONE 500 MG/1
1000 INJECTION, POWDER, FOR SOLUTION INTRAMUSCULAR; INTRAVENOUS ONCE
Refills: 0 | Status: DISCONTINUED | OUTPATIENT
Start: 2023-02-11 | End: 2023-02-14

## 2023-02-10 RX ORDER — INSULIN LISPRO 100/ML
VIAL (ML) SUBCUTANEOUS
Refills: 0 | Status: DISCONTINUED | OUTPATIENT
Start: 2023-02-10 | End: 2023-02-14

## 2023-02-10 RX ADMIN — Medication 4: at 22:25

## 2023-02-10 RX ADMIN — CEFTRIAXONE 100 MILLIGRAM(S): 500 INJECTION, POWDER, FOR SOLUTION INTRAMUSCULAR; INTRAVENOUS at 17:31

## 2023-02-10 RX ADMIN — INSULIN GLARGINE 22 UNIT(S): 100 INJECTION, SOLUTION SUBCUTANEOUS at 22:21

## 2023-02-10 RX ADMIN — SODIUM CHLORIDE 1000 MILLILITER(S): 9 INJECTION INTRAMUSCULAR; INTRAVENOUS; SUBCUTANEOUS at 17:31

## 2023-02-10 RX ADMIN — Medication 30 MILLIGRAM(S): at 22:26

## 2023-02-10 NOTE — ED ADULT NURSE NOTE - OBJECTIVE STATEMENT
Received patient to 26a for hyperglycemia. A&o4, ambulatory, noticed BGL >300 today, endorsing polydipsia and polyuria. Also states has chronic cough, denies chest pain, SOB. RR even and unlabored, speaking clear.  Vitals as noted, labs sent.

## 2023-02-10 NOTE — ED PROVIDER NOTE - NSICDXPASTMEDICALHX_GEN_ALL_CORE_FT
PAST MEDICAL HISTORY:  Diabetes     Hyperlipidemia     Hypertension     Migraines     Nephrolithiasis     Renal infection     Renal stones     Tear of meniscus of knee

## 2023-02-10 NOTE — ED ADULT TRIAGE NOTE - CHIEF COMPLAINT QUOTE
Pt w. hx of hld htn DM2 , daily smoker c/o hyperglycemia this am home BG = 300mg/dL, w/ associated polyuria, polydipsia, intermittent nausea and fatigue.  Pt denies abdominal pain, vomiting, dizziness

## 2023-02-10 NOTE — ED CDU PROVIDER INITIAL DAY NOTE - CLINICAL SUMMARY MEDICAL DECISION MAKING FREE TEXT BOX
57M PMH HTN, HLD, DM (on metformin, trulicity, and actos), L renal carcinoma s/p resection p/w hyperglycemia. Pt found to have UTI and elevated creatinine. Plan: hyperglycemic control and antibiotics. Possible endocrine consult.

## 2023-02-10 NOTE — ED PROVIDER NOTE - PHYSICAL EXAMINATION
PHYSICAL EXAM:  GENERAL: Sitting comfortable in bed, in no acute distress  HENMT: Atraumatic, moist mucous membranes, no oropharyngeal exudates or vesicles, uvula is midline EYES: Clear bilaterally, PERRL, EOMs intact b/l  HEART: RRR, S1/S2, no murmur  RESPIRATORY: Clear to auscultation bilaterally, no wheezes/rhonchi/rales  ABDOMEN: Soft, nontender, nondistended  GENITOURINARY: Normal genitalia, no balanitis  EXTREMITIES: No lower extremity edema, +2 radial and pt pulses b/l  NEURO: A&Ox4, CN II-XII grossly intact, no focal motor deficits or sensory deficits   SKIN: Skin normal color for race, warm, dry and intact PHYSICAL EXAM:  GENERAL: Sitting comfortable in bed, in no acute distress  HENMT: Atraumatic, moist mucous membranes, no oropharyngeal exudates or vesicles, uvula is midline EYES: Clear bilaterally, PERRL, EOMs intact b/l  HEART: RRR, S1/S2, no murmur  RESPIRATORY: Clear to auscultation bilaterally, no wheezes/rhonchi/rales  ABDOMEN: Soft, nontender, nondistended  GENITOURINARY: Declining genital exam. Pt says he had a cystogram 2 days ago and his urologist said his genitals looked normal  EXTREMITIES: No lower extremity edema, +2 radial and pt pulses b/l  NEURO: A&Ox4, CN II-XII grossly intact, no focal motor deficits or sensory deficits   SKIN: Skin normal color for race, warm, dry and intact

## 2023-02-10 NOTE — ED PROVIDER NOTE - OBJECTIVE STATEMENT
57M PMH HTN, HLD, DM on metformin, trulicity, and actos p/w hyperglycemia. Pt says that 57M PMH HTN, HLD, DM on metformin, trulicity, and actos p/w hyperglycemia. Pt says that his sugars have been high over the past couple days. He has also felt weak and dizzy. Endorses cough productive of green sputum for the past several months, for which he is being worked up. Saw his urologist 2 days ago and was told he has a UTI. Treated with 1 dose of bactrim. Denies fever, N/V, CP, SOB, abdominal pain, urinary symptoms, diarrhea. 57M PMH HTN, HLD, DM (on metformin, trulicity, and actos), L renal carcinoma s/p resection p/w hyperglycemia. Pt says that his sugars have been high over the past couple days. He has also felt weak and dizzy. Endorses cough productive of green sputum for the past several months, for which he is being worked up. Saw his urologist 2 days ago and was told he has a UTI. Treated with 1 dose of bactrim. Denies fever, N/V, CP, SOB, abdominal pain, urinary symptoms, diarrhea.

## 2023-02-10 NOTE — ED CDU PROVIDER INITIAL DAY NOTE - OBJECTIVE STATEMENT
57M PMH HTN, HLD, DM (on metformin, trulicity, and actos), L renal carcinoma s/p resection p/w hyperglycemia. Pt says that his sugars have been high over the past couple days. He has also felt weak and dizzy. Endorses cough productive of green sputum for the past several months, for which he is being worked up. Saw his urologist 2 days ago and was told he has a UTI. Treated with 1 dose of bactrim. Denies fever, N/V, CP, SOB, abdominal pain, urinary symptoms, diarrhea.

## 2023-02-10 NOTE — ED ADULT NURSE REASSESSMENT NOTE - NS ED NURSE REASSESS COMMENT FT1
Received report from Alma Akins RN: Pt A&Ox4, ambulatory, no complaints at this moment, respirations are even and unlabored, NAD, VS noted, denies any discomfort or pain, awaiting for CDU bed assignment. Safety precautions implemented as per protocol, awaiting further MD orders, will continue with plan of care.

## 2023-02-10 NOTE — ED CDU PROVIDER INITIAL DAY NOTE - ATTENDING APP SHARED VISIT CONTRIBUTION OF CARE
Resident provider note HPI "57M PMH HTN, HLD, DM (on metformin, trulicity, and actos), L renal carcinoma s/p resection p/w hyperglycemia. Pt says that his sugars have been high over the past couple days. He has also felt weak and dizzy. Endorses cough productive of green sputum for the past several months, for which he is being worked up. Saw his urologist 2 days ago and was told he has a UTI. Treated with 1 dose of bactrim. Denies fever, N/V, CP, SOB, abdominal pain, urinary symptoms, diarrhea. "Vital Signs Stable   PE: as described; my additions and exceptions are noted in the chart    DATA:  EKG: pending at time of evaluation  LAB: Pending at time of evaluation    IMPRESSION/RISK:  Dx=hyperglycemia    Patient admitted for endocrine consult and treatment of hypoglycemia   will reassess

## 2023-02-10 NOTE — ED ADULT NURSE NOTE - NSIMPLEMENTINTERV_GEN_ALL_ED
Implemented All Fall Risk Interventions:  [place] to call system. Call bell, personal items and telephone within reach. Instruct patient to call for assistance. Room bathroom lighting operational. Non-slip footwear when patient is off stretcher. Physically safe environment: no spills, clutter or unnecessary equipment. Stretcher in lowest position, wheels locked, appropriate side rails in place. Provide visual cue, wrist band, yellow gown, etc. Monitor gait and stability. Monitor for mental status changes and reorient to person, place, and time. Review medications for side effects contributing to fall risk. Reinforce activity limits and safety measures with patient and family.

## 2023-02-10 NOTE — ED PROVIDER NOTE - ATTENDING CONTRIBUTION TO CARE
Alem Mosher MD attending physician patient is a 57-year-old man who is on metformin for his diabetes as well as injectables.  Patient complains of polyuria polydipsia.  Also has a history of hypertension and high lipids.  He notes that his blood glucose this morning was 300 which is very unusual for him.  He is also a daily smoker.  He denies fevers abdominal pain diarrhea.  He does have some cough with some green mucus.  He is awake alert and appropriate and concerned about his hyperglycemia.  I will sign him out to Dr. Urena.  Pending labs IV hydration and reevaluation    I performed a history and physical exam of the patient and discussed their management with the resident and /or advanced care provider. I reviewed the resident and /or ACP's note and agree with the documented findings and plan of care. My medical decison making and observations are found above.

## 2023-02-10 NOTE — ED CDU PROVIDER INITIAL DAY NOTE - PHYSICAL EXAMINATION
PHYSICAL EXAM:  GENERAL: Sitting comfortable in bed, in no acute distress  HENMT: Atraumatic, moist mucous membranes, no oropharyngeal exudates or vesicles, uvula is midline EYES: Clear bilaterally, PERRL, EOMs intact b/l  HEART: RRR, S1/S2, no murmur  RESPIRATORY: Clear to auscultation bilaterally, no wheezes/rhonchi/rales  ABDOMEN: Soft, nontender, nondistended  GENITOURINARY: Declining genital exam. Pt says he had a cystogram 2 days ago and his urologist said his genitals looked normal  EXTREMITIES: No lower extremity edema, +2 radial and pt pulses b/l  NEURO: A&Ox4, CN II-XII grossly intact, no focal motor deficits or sensory deficits   SKIN: Skin normal color for race, warm, dry and intact

## 2023-02-10 NOTE — ED PROVIDER NOTE - PROGRESS NOTE DETAILS
Nany Dallas M.D. (Resident Physician): Cr slightly elevated. Pt thinks he has been told in the past that his Cr was elevated.

## 2023-02-10 NOTE — ED PROVIDER NOTE - NSICDXPASTSURGICALHX_GEN_ALL_CORE_FT
PAST SURGICAL HISTORY:  H/O arthroscopy of right knee repair of torn meniscus    History of lithotripsy

## 2023-02-10 NOTE — ED PROVIDER NOTE - CLINICAL SUMMARY MEDICAL DECISION MAKING FREE TEXT BOX
Subjective:      Ansley Horne is a 29 y.o. female who presents for possible ear infection. Symptoms include left ear pain and congestion. Onset of symptoms was 2 days ago, rapidly worsening since that time. Associated symptoms include left ear pressure/pain, which have been present for 3 days . She is drinking plenty of fluids. She is 12 weeks pregnant. Problem List:     Patient Active Problem List    Diagnosis Date Noted    ADD (attention deficit disorder)      Medical History:     Past Medical History:   Diagnosis Date    ADD (attention deficit disorder)      Allergies:   No Known Allergies   Medications:     Current Outpatient Medications   Medication Sig    folic acid (FOLVITE) 1 mg tablet Take  by mouth daily.  amoxicillin (AMOXIL) 500 mg capsule Take 1 Cap by mouth two (2) times a day.  dextroamphetamine-amphetamine (ADDERALL) 30 mg tablet Take 0.5 Tabs by mouth two (2) times a day. No current facility-administered medications for this visit. Surgical History:     Past Surgical History:   Procedure Laterality Date    HX HEENT       Social History:     Social History     Socioeconomic History    Marital status:      Spouse name: Not on file    Number of children: Not on file    Years of education: Not on file    Highest education level: Not on file   Tobacco Use    Smoking status: Former Smoker   Substance and Sexual Activity    Alcohol use: Yes     Comment: rarely    Drug use: No    Sexual activity: Yes     Partners: Male     Birth control/protection: Pill, None         Objective:     ROS:   Feeling well. No dyspnea or chest pain on exertion. No abdominal pain, change in bowel habits, black or bloody stools. No urinary tract symptoms. OBJECTIVE:   The patient appears well, alert, oriented x 3, in no distress.   Visit Vitals  BP (!) 140/92 (BP 1 Location: Left arm, BP Patient Position: Sitting)   Pulse (!) 106   Temp 98 °F (36.7 °C) (Oral)   Resp 16   Ht 5' 5\" (1.651 m) Wt 214 lb (97.1 kg)   SpO2 98%   BMI 35.61 kg/m²     HEENT:ENT right canal with mild redness, TM with slight bulge, left TM red, bulging with white fluid visible, canal red, no drainage. .  Neck supple. No adenopathy or thyromegaly. CAMILA. Chest: Lungs are clear, good air entry, no wheezes, rhonchi or rales. Cardiovascular: S1 and S2 normal, no murmurs, regular rate and rhythm. Abdomen: soft without tenderness, guarding, mass or organomegaly. Extremities: show no edema, normal peripheral pulses. Neurological: is normal, no focal findings. Assessment/Plan:       ICD-10-CM ICD-9-CM    1. Left non-suppurative otitis media H65.92 381.4 amoxicillin (AMOXIL) 500 mg capsule   . blood pressure being monitored by physician monthly. Alem Mosher MD attending physician patient is a 57-year-old man who is on metformin for his diabetes as well as injectables.  Patient complains of polyuria polydipsia.  Also has a history of hypertension and high lipids.  He notes that his blood glucose this morning was 300 which is very unusual for him.  He is also a daily smoker.  He denies fevers abdominal pain diarrhea.  He does have some cough with some green mucus.  He is awake alert and appropriate and concerned about his hyperglycemia.  I will sign him out to Dr. Urena.  Pending labs IV hydration and reevaluation

## 2023-02-11 VITALS
TEMPERATURE: 98 F | SYSTOLIC BLOOD PRESSURE: 129 MMHG | RESPIRATION RATE: 18 BRPM | OXYGEN SATURATION: 99 % | DIASTOLIC BLOOD PRESSURE: 73 MMHG | HEART RATE: 91 BPM

## 2023-02-11 DIAGNOSIS — I10 ESSENTIAL (PRIMARY) HYPERTENSION: ICD-10-CM

## 2023-02-11 DIAGNOSIS — E11.65 TYPE 2 DIABETES MELLITUS WITH HYPERGLYCEMIA: ICD-10-CM

## 2023-02-11 DIAGNOSIS — E78.5 HYPERLIPIDEMIA, UNSPECIFIED: ICD-10-CM

## 2023-02-11 LAB
ANION GAP SERPL CALC-SCNC: 13 MMOL/L — SIGNIFICANT CHANGE UP (ref 7–14)
BASOPHILS # BLD AUTO: 0.02 K/UL — SIGNIFICANT CHANGE UP (ref 0–0.2)
BASOPHILS NFR BLD AUTO: 0.2 % — SIGNIFICANT CHANGE UP (ref 0–2)
BLOOD GAS VENOUS COMPREHENSIVE RESULT: SIGNIFICANT CHANGE UP
BUN SERPL-MCNC: 44 MG/DL — HIGH (ref 7–23)
CALCIUM SERPL-MCNC: 10.1 MG/DL — SIGNIFICANT CHANGE UP (ref 8.4–10.5)
CHLORIDE SERPL-SCNC: 95 MMOL/L — LOW (ref 98–107)
CO2 SERPL-SCNC: 25 MMOL/L — SIGNIFICANT CHANGE UP (ref 22–31)
CREAT SERPL-MCNC: 1.37 MG/DL — HIGH (ref 0.5–1.3)
EGFR: 60 ML/MIN/1.73M2 — SIGNIFICANT CHANGE UP
EOSINOPHIL # BLD AUTO: 0.08 K/UL — SIGNIFICANT CHANGE UP (ref 0–0.5)
EOSINOPHIL NFR BLD AUTO: 0.9 % — SIGNIFICANT CHANGE UP (ref 0–6)
GLUCOSE SERPL-MCNC: 261 MG/DL — HIGH (ref 70–99)
HCT VFR BLD CALC: 40.7 % — SIGNIFICANT CHANGE UP (ref 39–50)
HGB BLD-MCNC: 13.6 G/DL — SIGNIFICANT CHANGE UP (ref 13–17)
IANC: 5.21 K/UL — SIGNIFICANT CHANGE UP (ref 1.8–7.4)
IMM GRANULOCYTES NFR BLD AUTO: 0.6 % — SIGNIFICANT CHANGE UP (ref 0–0.9)
LYMPHOCYTES # BLD AUTO: 2.39 K/UL — SIGNIFICANT CHANGE UP (ref 1–3.3)
LYMPHOCYTES # BLD AUTO: 28.1 % — SIGNIFICANT CHANGE UP (ref 13–44)
MCHC RBC-ENTMCNC: 29.7 PG — SIGNIFICANT CHANGE UP (ref 27–34)
MCHC RBC-ENTMCNC: 33.4 GM/DL — SIGNIFICANT CHANGE UP (ref 32–36)
MCV RBC AUTO: 88.9 FL — SIGNIFICANT CHANGE UP (ref 80–100)
MONOCYTES # BLD AUTO: 0.76 K/UL — SIGNIFICANT CHANGE UP (ref 0–0.9)
MONOCYTES NFR BLD AUTO: 8.9 % — SIGNIFICANT CHANGE UP (ref 2–14)
NEUTROPHILS # BLD AUTO: 5.21 K/UL — SIGNIFICANT CHANGE UP (ref 1.8–7.4)
NEUTROPHILS NFR BLD AUTO: 61.3 % — SIGNIFICANT CHANGE UP (ref 43–77)
NRBC # BLD: 0 /100 WBCS — SIGNIFICANT CHANGE UP (ref 0–0)
NRBC # FLD: 0 K/UL — SIGNIFICANT CHANGE UP (ref 0–0)
PLATELET # BLD AUTO: 173 K/UL — SIGNIFICANT CHANGE UP (ref 150–400)
POTASSIUM SERPL-MCNC: 3.8 MMOL/L — SIGNIFICANT CHANGE UP (ref 3.5–5.3)
POTASSIUM SERPL-SCNC: 3.8 MMOL/L — SIGNIFICANT CHANGE UP (ref 3.5–5.3)
RBC # BLD: 4.58 M/UL — SIGNIFICANT CHANGE UP (ref 4.2–5.8)
RBC # FLD: 12.5 % — SIGNIFICANT CHANGE UP (ref 10.3–14.5)
SODIUM SERPL-SCNC: 133 MMOL/L — LOW (ref 135–145)
WBC # BLD: 8.51 K/UL — SIGNIFICANT CHANGE UP (ref 3.8–10.5)
WBC # FLD AUTO: 8.51 K/UL — SIGNIFICANT CHANGE UP (ref 3.8–10.5)

## 2023-02-11 PROCEDURE — 99238 HOSP IP/OBS DSCHRG MGMT 30/<: CPT

## 2023-02-11 PROCEDURE — 99284 EMERGENCY DEPT VISIT MOD MDM: CPT

## 2023-02-11 RX ORDER — INSULIN GLARGINE 100 [IU]/ML
20 INJECTION, SOLUTION SUBCUTANEOUS
Qty: 5 | Refills: 0
Start: 2023-02-11 | End: 2023-03-12

## 2023-02-11 RX ORDER — ISOPROPYL ALCOHOL, BENZOCAINE .7; .06 ML/ML; ML/ML
1 SWAB TOPICAL
Qty: 100 | Refills: 1
Start: 2023-02-11 | End: 2023-04-01

## 2023-02-11 RX ORDER — DULAGLUTIDE 4.5 MG/.5ML
1.5 INJECTION, SOLUTION SUBCUTANEOUS
Qty: 2 | Refills: 0
Start: 2023-02-11 | End: 2023-02-14

## 2023-02-11 RX ORDER — PIOGLITAZONE HYDROCHLORIDE 15 MG/1
1 TABLET ORAL
Qty: 30 | Refills: 0
Start: 2023-02-11 | End: 2023-03-12

## 2023-02-11 RX ORDER — REPAGLINIDE 1 MG/1
1 TABLET ORAL
Qty: 30 | Refills: 0
Start: 2023-02-11 | End: 2023-03-12

## 2023-02-11 RX ORDER — METFORMIN HYDROCHLORIDE 850 MG/1
1 TABLET ORAL
Qty: 60 | Refills: 0
Start: 2023-02-11 | End: 2023-03-12

## 2023-02-11 RX ADMIN — Medication 6 UNIT(S): at 07:47

## 2023-02-11 RX ADMIN — Medication 2: at 11:29

## 2023-02-11 RX ADMIN — LOSARTAN POTASSIUM 100 MILLIGRAM(S): 100 TABLET, FILM COATED ORAL at 06:32

## 2023-02-11 RX ADMIN — Medication 6 UNIT(S): at 11:30

## 2023-02-11 RX ADMIN — Medication 3: at 07:47

## 2023-02-11 RX ADMIN — ATORVASTATIN CALCIUM 40 MILLIGRAM(S): 80 TABLET, FILM COATED ORAL at 11:31

## 2023-02-11 NOTE — ED CDU PROVIDER SUBSEQUENT DAY NOTE - CLINICAL SUMMARY MEDICAL DECISION MAKING FREE TEXT BOX
57M PMH HTN, HLD, DM (on metformin, trulicity, and actos), L renal carcinoma s/p resection p/w hyperglycemia. Pt found to have UTI and elevated creatinine. Plan: hyperglycemic control and antibiotics, endocrinology consultation, initiation of insulin therapy.

## 2023-02-11 NOTE — ED CDU PROVIDER SUBSEQUENT DAY NOTE - HISTORY
57M PMH HTN, HLD, DM (on metformin, trulicity, and actos), L renal carcinoma s/p resection p/w hyperglycemia. Pt says that his sugars have been high over the past couple days. He has also felt weak and dizzy. Endorses cough productive of green sputum for the past several months, for which he is being worked up. Saw his urologist 2 days ago and was told he has a UTI. Treated with 1 dose of bactrim. Denies fever, N/V, CP, SOB, abdominal pain, urinary symptoms, diarrhea. Pt to CDU for initiation of insulin therapy, tx of UTI and endocrinology consultation. Pt has been stable while in CDU, no events overnight.

## 2023-02-11 NOTE — ED CDU PROVIDER DISPOSITION NOTE - ATTENDING CONTRIBUTION TO CARE
Patient feeling well, seen by endocrine and recommendations incorporated into discharge plan. okay for discharge

## 2023-02-11 NOTE — CONSULT NOTE ADULT - PROBLEM/RECOMMENDATION-3
Spoke with Curry, states he has an appointment on 12/11/2019 to see MD will wait until then to get his flu shot.    DISPLAY PLAN FREE TEXT

## 2023-02-11 NOTE — ED CDU PROVIDER DISPOSITION NOTE - NSFOLLOWUPINSTRUCTIONS_ED_ALL_ED_FT
Follow-up with your endocrinologist within 1 week.    Follow-up with your primary care doctor within 1 week.      For diabetes management take the following medications:   Lantus 20 units at bedtime  Metformin ER 1 g twice a day  Trulicity 1.5 mg subq once weekly  Actos 30 mg daily  Prandin 1 mg before meals    Check your blood sugar before meals and at bedtime, keep a record of your results to bring to your doctor at follow-up.    Return to the ER with any worsening or concerning symptoms, weakness, abdominal pain, nausea, vomiting or any other concerns.    **Continue taking antibiotic prescribed to you by your urologist***

## 2023-02-11 NOTE — ED CDU PROVIDER SUBSEQUENT DAY NOTE - ATTENDING APP SHARED VISIT CONTRIBUTION OF CARE
Patient examined, , feels well, c/o frequency of urination, HEENT nml heart s1s2, lungs clear. abd soft nontender. Discussed diet, patient trying to eat low carbs.

## 2023-02-11 NOTE — CONSULT NOTE ADULT - SUBJECTIVE AND OBJECTIVE BOX
HPI:  57M PMH HTN, HLD, DM (on metformin, trulicity, and actos), L renal carcinoma s/p resection p/w hyperglycemia    Diabetes History:   Most recent A1c 10.8%  -Diagnosed years ago  -Endocrinologist - Dr. Huang  -Current Regimen - Metformin ER 1g BID, Trulicity 3 mg weekly (not tolerating, tolerated lower doses), Actos 30 mg daily  -Past Regimens - was on Amaryl and Prandin in the past, but these were switched to Trulicity  -FS at home - for about a month, has noticed glucoses persistently in the 300s, before that was in the 120s-140s range. He got knee injections 1 month ago (states it was a lubricant, not sure if there were steroids in the injections)   -Diet - average, can cut back on white bread and pasta  -Complications/Diabetes HCM - no known complications, no retinopathy. Smokes cigarettes        PAST MEDICAL & SURGICAL HISTORY:  Hypertension      Nephrolithiasis      Diabetes      Migraines      Renal stones      Renal infection      Tear of meniscus of knee      Hyperlipidemia      H/O arthroscopy of right knee  repair of torn meniscus      History of lithotripsy          FAMILY HISTORY:  Family history of essential hypertension (Grandparent)        Social History: (+) tobacco use    Home Medications:  Crestor 10 mg oral tablet: 1 tab(s) orally once a day (at bedtime) (13 Nov 2018 07:29)  Januvia 25 mg oral tablet: 1 tab(s) orally once a day (13 Nov 2018 07:29)  metFORMIN 1000 mg oral tablet, extended release: 1 tab(s) orally 2 times a day (13 Nov 2018 07:29)  Norvasc 10 mg oral tablet: 1 tab(s) orally once a day (13 Nov 2018 07:29)  Prandin 1 mg oral tablet: 1 tab(s) orally 3 times a day (before meals) (13 Nov 2018 07:29)  Toprol-XL 25 mg oral tablet, extended release: 1 tab(s) orally once a day (13 Nov 2018 07:29)      MEDICATIONS  (STANDING):  atorvastatin 40 milliGRAM(s) Oral daily  cefTRIAXone   IVPB 1000 milliGRAM(s) IV Intermittent once  dextrose 5%. 1000 milliLiter(s) (50 mL/Hr) IV Continuous <Continuous>  dextrose 5%. 1000 milliLiter(s) (100 mL/Hr) IV Continuous <Continuous>  dextrose 50% Injectable 25 Gram(s) IV Push once  dextrose 50% Injectable 12.5 Gram(s) IV Push once  dextrose 50% Injectable 25 Gram(s) IV Push once  glucagon  Injectable 1 milliGRAM(s) IntraMuscular once  hydrochlorothiazide 12.5 milliGRAM(s) Oral daily  insulin glargine Injectable (LANTUS) 22 Unit(s) SubCutaneous at bedtime  insulin lispro (ADMELOG) corrective regimen sliding scale   SubCutaneous three times a day before meals  insulin lispro Injectable (ADMELOG) 6 Unit(s) SubCutaneous before breakfast  insulin lispro Injectable (ADMELOG) 6 Unit(s) SubCutaneous before lunch  insulin lispro Injectable (ADMELOG) 6 Unit(s) SubCutaneous before dinner  losartan 100 milliGRAM(s) Oral daily    MEDICATIONS  (PRN):  dextrose Oral Gel 15 Gram(s) Oral once PRN Blood Glucose LESS THAN 70 milliGRAM(s)/deciliter      Allergies    No Known Allergies    Intolerances      Review of Systems:  Constitutional: No fever  Eyes: No blurry vision  Neuro: No tremors  HEENT: No pain  Cardiovascular: No chest pain, palpitations  Respiratory: No SOB, no cough  GI: No nausea, vomiting, abdominal pain  : No dysuria  Skin: no rash  Psych: no depression  Endocrine: no polyuria, polydipsia  Hem/lymph: no swelling  Osteoporosis: no fractures    PHYSICAL EXAM:  VITALS: T(C): 36.6 (02-11-23 @ 13:23)  T(F): 97.8 (02-11-23 @ 13:23), Max: 98.6 (02-10-23 @ 17:21)  HR: 91 (02-11-23 @ 13:23) (86 - 94)  BP: 129/73 (02-11-23 @ 13:23) (114/72 - 135/75)  RR:  (16 - 18)  SpO2:  (97% - 100%)  Wt(kg): --  GENERAL: NAD  EYES: No proptosis, no lid lag, anicteric  THYROID: Normal size, no palpable nodules  RESPIRATORY: Clear to auscultation bilaterally  CARDIOVASCULAR: Regular rate and rhythm  GI: Soft, nontender, non distended  EXT: b/l feet without wounds; 2+ pulses  PSYCH: Alert and oriented x 3, reactive mood    POCT Blood Glucose.: 213 mg/dL (02-11-23 @ 11:22)  POCT Blood Glucose.: 259 mg/dL (02-11-23 @ 07:41)  POCT Blood Glucose.: 316 mg/dL (02-10-23 @ 21:52)  POCT Blood Glucose.: 283 mg/dL (02-10-23 @ 13:41)                            13.6   8.51  )-----------( 173      ( 11 Feb 2023 07:00 )             40.7       02-11    133<L>  |  95<L>  |  44<H>  ----------------------------<  261<H>  3.8   |  25  |  1.37<H>    eGFR: 60    Ca    10.1      02-11    TPro  7.7  /  Alb  4.3  /  TBili  0.4  /  DBili  x   /  AST  11  /  ALT  16  /  AlkPhos  100  02-10      Thyroid Function Tests:      A1C with Estimated Average Glucose Result: 10.8 % (02-10-23 @ 15:33)          Radiology:

## 2023-02-11 NOTE — CONSULT NOTE ADULT - ASSESSMENT
57M PMH HTN, HLD, DM (on metformin, trulicity, and actos), L renal carcinoma s/p resection p/w hyperglycemia    #Uncontrolled T2DM  A1c 10.8% Goal < 7%  Inpatient FS goal 140-180  Home regimen: Metformin ER 1g BID, Trulicity 3 mg weekly (not tolerating, tolerated lower doses), Actos 30 mg daily  was on Amaryl and Prandin in the past, but these were switched to Trulicity  says Amaryl and Prandin controlled his sugars very well    Recs:   -Lantus 27 units qHS  -Admelog 9 units qAC. HOLD if NPO  -low dose correctional scale before each meal and low dose correctional scale at bedtime  -consistent carb diet  -FS qAC and qHS  -RD consult and insulin pen teaching (ordered)     For d/c:   -Lantus __ units qHS + Metformin ER 1g BID + Trulicity 1.5 mg subq/weekly + Prandin 0.5 mg before meals  -Can fu with Dr. Huang     #HTN  BP Goal < 130/80  Continue management per primary team    #HLD  LDL goal < 70  Statin if no contraindications    Kathy Barajas MD  Endocrine Fellow  Can be reached via teams.     For all urgent matters, can call answering service at [phone] (weekdays); [phone] (nights/weekends)  Any non-urgent consults or questions can be emailed to [email or web address] or [email or web address]     57M PMH HTN, HLD, DM (on metformin, trulicity, and actos), L renal carcinoma s/p resection p/w hyperglycemia    #Uncontrolled T2DM  A1c 10.8% Goal < 7%  Inpatient FS goal 140-180  Home regimen: Metformin ER 1g BID, Trulicity 3 mg weekly (not tolerating, tolerated lower doses), Actos 30 mg daily  was on Amaryl and Prandin in the past, but these were switched to Trulicity  says Amaryl and Prandin controlled his sugars very well    Recs:   -Lantus 27 units qHS  -Admelog 9 units qAC. HOLD if NPO  -low dose correctional scale before each meal and low dose correctional scale at bedtime  -consistent carb diet  -FS qAC and qHS  -RD consult and insulin pen teaching (ordered)     For d/c:   -Lantus 20 units qHS + Metformin ER 1g BID + Trulicity 1.5 mg subq/weekly +  Actos 30 mg daily+ Prandin 1 mg before meals   -Please d/c with supplies: insulin pens, pen needles, glucometer, test strips, lancets, and alcohol pads   -Can fu with Dr. Huang     #HTN  BP Goal < 130/80  Continue management per primary team    #HLD  LDL goal < 70  Statin if no contraindications    Kathy Barajas MD  Endocrine Fellow  Can be reached via teams.     For all urgent matters, can call answering service at [phone] (weekdays); [phone] (nights/weekends)  Any non-urgent consults or questions can be emailed to [email or web address] or [email or web address]

## 2023-02-11 NOTE — ED CDU PROVIDER DISPOSITION NOTE - PATIENT PORTAL LINK FT
You can access the FollowMyHealth Patient Portal offered by [facility] by registering at the following website: [email or web address]. By joining [employer]’s FollowMyHealth portal, you will also be able to view your health information using other applications (apps) compatible with our system.

## 2023-02-11 NOTE — ED CDU PROVIDER SUBSEQUENT DAY NOTE - NSICDXPASTSURGICALHX_GEN_ALL_CORE_FT
PAST SURGICAL HISTORY:  H/O arthroscopy of right knee repair of torn meniscus    History of lithotripsy     
No

## 2023-02-11 NOTE — ED CDU PROVIDER SUBSEQUENT DAY NOTE - PROGRESS NOTE DETAILS
Pt seen by endocrine, recommending Lantus 20 units at bedtime, metformin ER 1 g twice a day, Trulicity 1.5 mg subq weekly injection, Actos 30 mg daily and Prandin 1 mg before meals.  Patient has an endocrinologist that he can follow-up with as an outpatient.  All prescriptions and insulin supplies sent to patient's pharmacy.  Patient will return to the ER with any worsening or concerning symptoms.  At time of discharge patient is well-appearing, vital stable, no complaints at present.

## 2023-02-11 NOTE — CONSULT NOTE ADULT - ATTENDING COMMENTS
Patient is a 57 year man with history hypertension, hyperlipidemia, type 2 diabetes, presenting with hyperglycemia.  Found to have A1c of 7.8%.  Follows up with outpatient endocrinologist Dr. Huang.  Currently on metformin ER 1000 mg twice daily, Trulicity 3 mg once weekly but not tolerating, Actos 30 mg once daily.  Given severe hyperglycemia, will start basal insulin 20 units at bedtime, recommend to continue with metformin ER 1000 mg twice daily, reduce Trulicity to 1.5 mg once weekly, Actos 30 mg once daily and repaglinide 1 mg 3 times daily before meals.  Patient is to follow-up with outpatient endocrinologist Dr. Huang.  For BP management, continue with hydrochlorothiazide 12.5 mg once daily, patient is on losartan 100 mg once daily.  Regarding hyperlipidemia, continue with atorvastatin 40 mg daily.

## 2023-02-11 NOTE — ED CDU PROVIDER DISPOSITION NOTE - CLINICAL COURSE
57M PMH HTN, HLD, DM (on metformin, trulicity, and actos), L renal carcinoma s/p resection p/w hyperglycemia. Pt says that his sugars have been high over the past couple days. He has also felt weak and dizzy. Endorses cough productive of green sputum for the past several months, for which he is being worked up. Saw his urologist 2 days ago and was told he has a UTI. Treated with 1 dose of bactrim. Denies fever, N/V, CP, SOB, abdominal pain, urinary symptoms, diarrhea. Pt sent to CDU for initiation of insulin therapy, tx of UTI and endocrinology consultation. Pt seen by endocrine, recommending Lantus 20 units at bedtime, metformin ER 1 g twice a day, Trulicity 1.5 mg subq weekly injection, Actos 30 mg daily and Prandin 1 mg before meals.  Patient has an endocrinologist that he can follow-up with as an outpatient.  All prescriptions and insulin supplies sent to patient's pharmacy.  Patient will return to the ER with any worsening or concerning symptoms.  At time of discharge patient is well-appearing, vital stable, no complaints at present.

## 2023-02-12 LAB
CULTURE RESULTS: SIGNIFICANT CHANGE UP
SPECIMEN SOURCE: SIGNIFICANT CHANGE UP

## 2023-02-24 ENCOUNTER — APPOINTMENT (OUTPATIENT)
Dept: PHYSICAL MEDICINE AND REHAB | Facility: CLINIC | Age: 58
End: 2023-02-24

## 2023-02-28 ENCOUNTER — APPOINTMENT (OUTPATIENT)
Dept: PULMONOLOGY | Facility: CLINIC | Age: 58
End: 2023-02-28
Payer: MEDICARE

## 2023-02-28 VITALS
HEIGHT: 69 IN | SYSTOLIC BLOOD PRESSURE: 124 MMHG | DIASTOLIC BLOOD PRESSURE: 76 MMHG | HEART RATE: 79 BPM | BODY MASS INDEX: 35.55 KG/M2 | WEIGHT: 240 LBS

## 2023-02-28 PROCEDURE — 94726 PLETHYSMOGRAPHY LUNG VOLUMES: CPT

## 2023-02-28 PROCEDURE — ZZZZZ: CPT

## 2023-02-28 PROCEDURE — 99203 OFFICE O/P NEW LOW 30 MIN: CPT | Mod: 25

## 2023-02-28 PROCEDURE — 94729 DIFFUSING CAPACITY: CPT

## 2023-02-28 PROCEDURE — 94010 BREATHING CAPACITY TEST: CPT

## 2023-02-28 NOTE — HISTORY OF PRESENT ILLNESS
[Current] : current [Never] : never [>= 20 pack years] : >= 20 pack years [TextBox_4] : 56YO Male active smoker PMH Bipolar, HLD, HTN, DJD of bilateral hips, DMII, Renal cell carcinoma (tx surgery resection of left kidney) and rhinitis who presents for initial visit for cough.\par \par He states that he has had a cough that has been going on for 6mons to 1year. He has tested himself for COVID multiple times which has been negative. He states his cough is productive of yellow-green sputum. he denies fevers, chills. He has noticed weight gain since starting insulin but has not had weight loss. He denies night sweats. He has noticed SOB when walking down 1 flight of stairs and walking. He states he can walk 10 city blocks before having to stop for SOB.\par \par He has had some benefit since starting the azalasine and flonase nasal spray. \par \par He saw Dr. Kirkland in 2016 and had PFTs showing mild obstruction. He had a CXR showing bilateral lower lobe infiltrates and was recommended a Chest CT. \par \par Social: Tobacco: since 15 ranging from 3cigs to 1ppd and currently 1/2 pack per day, Denies vaping Alcohol: Rare occasions Drugs: Denies\par Occupation: Cook in a kitchen, PCA at [employer], currently unemployed\par Exposure: Denies, Has a cat never birds\par Surgical Hx: Right meniscus tear, Left kidney resection RCC\par Family Hx: Heart failure, Diabetes [TextBox_11] : .5 [TextBox_13] : 43

## 2023-02-28 NOTE — PHYSICAL EXAM
[No Acute Distress] : no acute distress [Normal Oropharynx] : normal oropharynx [Normal Rate/Rhythm] : normal rate/rhythm [No Resp Distress] : no resp distress [Oriented x3] : oriented x3 [TextBox_68] : Diffuse low pitched rhoncherous breath sounds [TextBox_99] : walks with cane [TextBox_105] : mild clubbing, 1+ pitting edema bilaterally

## 2023-02-28 NOTE — REVIEW OF SYSTEMS
[Cough] : cough [SOB on Exertion] : sob on exertion [Edema] : edema [Fever] : no fever [Chills] : no chills [Chest Discomfort] : no chest discomfort [TextBox_30] : productive of yellow greenish sputum

## 2023-02-28 NOTE — ASSESSMENT
[FreeTextEntry1] : 56YO Male active smoker PMH Bipolar, HLD, HTN, DJD of bilateral hips, DMII, Renal cell carcinoma (tx surgery resection of left kidney) and rhinitis who presents for initial visit for cough.\par \par #SOB on exertion\par #Active smoking\par - SOB on exertion noted\par - Pt did not desaturate on ambulatory pulse ox in office today\par - PFTs within normal limits\par - Will be getting Echocardiogram report from Dr. Sheriff\par - Given smoking history pt qualifies for Low dose CT for lung cancer screening- Rx given\par - Given samples for Anoro elipta QD x1 month and demonstrated in office how to use. he will try and report if his symptoms improve\par - Counseled and gave resource pamphlet on smoking cessation which he is willing to do\par \par RTC in 1 month\par \par Case discussed with Dr. Wise

## 2023-03-04 ENCOUNTER — APPOINTMENT (OUTPATIENT)
Dept: CARE COORDINATION | Facility: HOME HEALTH | Age: 58
End: 2023-03-04
Payer: MEDICARE

## 2023-03-04 DIAGNOSIS — R73.03 PREDIABETES.: ICD-10-CM

## 2023-03-04 DIAGNOSIS — F31.9 BIPOLAR DISORDER, UNSPECIFIED: ICD-10-CM

## 2023-03-04 DIAGNOSIS — E78.5 HYPERLIPIDEMIA, UNSPECIFIED: ICD-10-CM

## 2023-03-04 DIAGNOSIS — F20.9 SCHIZOPHRENIA, UNSPECIFIED: ICD-10-CM

## 2023-03-04 DIAGNOSIS — I70.213 ATHEROSCLEROSIS OF NATIVE ARTERIES OF EXTREMITIES WITH INTERMITTENT CLAUDICATION, BILATERAL LEGS: ICD-10-CM

## 2023-03-04 DIAGNOSIS — G47.00 INSOMNIA, UNSPECIFIED: ICD-10-CM

## 2023-03-04 DIAGNOSIS — F33.1 MAJOR DEPRESSIVE DISORDER, RECURRENT, MODERATE: ICD-10-CM

## 2023-03-04 PROCEDURE — 99349 HOME/RES VST EST MOD MDM 40: CPT | Mod: 95

## 2023-03-04 RX ORDER — LOSARTAN POTASSIUM 100 MG/1
100 TABLET, FILM COATED ORAL
Refills: 0 | Status: ACTIVE | COMMUNITY

## 2023-03-04 RX ORDER — TRAZODONE HYDROCHLORIDE 50 MG/1
50 TABLET ORAL
Qty: 30 | Refills: 1 | Status: ACTIVE | COMMUNITY
Start: 2023-03-04

## 2023-03-04 RX ORDER — ROSUVASTATIN CALCIUM 40 MG/1
40 TABLET, FILM COATED ORAL DAILY
Refills: 0 | Status: ACTIVE | COMMUNITY

## 2023-03-04 RX ORDER — ARIPIPRAZOLE 5 MG/1
5 TABLET ORAL DAILY
Qty: 1 | Refills: 0 | Status: ACTIVE | COMMUNITY
Start: 2023-03-04

## 2023-03-04 RX ORDER — DICLOFENAC SODIUM 50 MG/1
50 TABLET, DELAYED RELEASE ORAL
Qty: 60 | Refills: 1 | Status: DISCONTINUED | COMMUNITY
Start: 2022-06-14 | End: 2023-03-04

## 2023-03-04 RX ORDER — FLUTICASONE PROPIONATE 50 UG/1
50 SPRAY, METERED NASAL
Qty: 1 | Refills: 3 | Status: DISCONTINUED | COMMUNITY
Start: 2022-03-09 | End: 2023-03-04

## 2023-03-04 RX ORDER — BUPROPION HYDROCHLORIDE 150 MG/1
150 TABLET, FILM COATED, EXTENDED RELEASE ORAL
Qty: 1 | Refills: 0 | Status: ACTIVE | COMMUNITY
Start: 2023-03-04

## 2023-03-04 RX ORDER — GLIMEPIRIDE 4 MG/1
TABLET ORAL
Refills: 0 | Status: DISCONTINUED | COMMUNITY
End: 2023-03-04

## 2023-03-08 ENCOUNTER — OUTPATIENT (OUTPATIENT)
Dept: OUTPATIENT SERVICES | Facility: HOSPITAL | Age: 58
LOS: 1 days | End: 2023-03-08
Payer: MEDICARE

## 2023-03-08 VITALS
HEART RATE: 89 BPM | RESPIRATION RATE: 17 BRPM | HEIGHT: 69 IN | WEIGHT: 238.1 LBS | SYSTOLIC BLOOD PRESSURE: 112 MMHG | DIASTOLIC BLOOD PRESSURE: 65 MMHG | TEMPERATURE: 99 F | OXYGEN SATURATION: 98 %

## 2023-03-08 DIAGNOSIS — N28.89 OTHER SPECIFIED DISORDERS OF KIDNEY AND URETER: Chronic | ICD-10-CM

## 2023-03-08 DIAGNOSIS — M19.90 UNSPECIFIED OSTEOARTHRITIS, UNSPECIFIED SITE: ICD-10-CM

## 2023-03-08 DIAGNOSIS — I10 ESSENTIAL (PRIMARY) HYPERTENSION: ICD-10-CM

## 2023-03-08 DIAGNOSIS — Z98.890 OTHER SPECIFIED POSTPROCEDURAL STATES: Chronic | ICD-10-CM

## 2023-03-08 DIAGNOSIS — E78.5 HYPERLIPIDEMIA, UNSPECIFIED: ICD-10-CM

## 2023-03-08 DIAGNOSIS — E11.9 TYPE 2 DIABETES MELLITUS WITHOUT COMPLICATIONS: ICD-10-CM

## 2023-03-08 DIAGNOSIS — Z01.818 ENCOUNTER FOR OTHER PREPROCEDURAL EXAMINATION: ICD-10-CM

## 2023-03-08 DIAGNOSIS — N20.0 CALCULUS OF KIDNEY: ICD-10-CM

## 2023-03-08 PROCEDURE — G0463: CPT

## 2023-03-08 RX ORDER — METOPROLOL TARTRATE 50 MG
1 TABLET ORAL
Qty: 0 | Refills: 0 | DISCHARGE

## 2023-03-08 RX ORDER — REPAGLINIDE 1 MG/1
1 TABLET ORAL
Qty: 0 | Refills: 0 | DISCHARGE

## 2023-03-08 RX ORDER — SITAGLIPTIN 50 MG/1
1 TABLET, FILM COATED ORAL
Qty: 0 | Refills: 0 | DISCHARGE

## 2023-03-08 RX ORDER — AMLODIPINE BESYLATE 2.5 MG/1
1 TABLET ORAL
Qty: 0 | Refills: 0 | DISCHARGE

## 2023-03-08 RX ORDER — METFORMIN HYDROCHLORIDE 850 MG/1
1 TABLET ORAL
Qty: 0 | Refills: 0 | DISCHARGE

## 2023-03-08 NOTE — H&P PST ADULT - ASSESSMENT
57 yr old male with history HTN, diabetes, HDL, left kidney carcinoma ( June 2020 surgical intervention only and surveillance by urologist) osteoarthritis past history of nephrolithiasis. Pt stated during his routine check up with his urologist it was noted of right calculus of the kidney. Pt denies any flank pain, hematuria, or difficulty urinating at this time. Pt is scheduled for right extracorporeal shock wave lithotripsy on 3/14/2023.

## 2023-03-08 NOTE — H&P PST ADULT - NSICDXPASTSURGICALHX_GEN_ALL_CORE_FT
PAST SURGICAL HISTORY:  H/O arthroscopy of right knee repair of torn meniscus    History of lithotripsy     Mass of left kidney

## 2023-03-08 NOTE — H&P PST ADULT - GENERAL
BMI 17.2.    He is homeless and wife  this past year  Estranged from his daughter, who is now in contact and will assist with discharge planning  Plan for long-term placement in Group Home when able to access finances   details…

## 2023-03-08 NOTE — H&P PST ADULT - HEMATOLOGY/LYMPHATICS
Patient called, last night she ate her normal meal/ carbs and gave herself her regular insulin via the pump  She started having low blood sugars and drank juice to even out  This morning she gave herself less insulin and when she woke up at 2:30PM her blood sugar was 350  She gave herself insulin and an hour later it dropped to 170  A few minutes later it went down to 107 so she called 911  She drank 6 juice pouches and evened out by the time the EMS got to her  Her CGM is now saying HIGH and her regular meter says 290  She has 5 units on board and would like to know how much to give herself  negative

## 2023-03-08 NOTE — H&P PST ADULT - NSICDXPASTMEDICALHX_GEN_ALL_CORE_FT
PAST MEDICAL HISTORY:  Diabetes     Hyperlipidemia     Hypertension     Migraines     Nephrolithiasis     OA (osteoarthritis)     Renal infection     Renal stones     Tear of meniscus of knee

## 2023-03-08 NOTE — H&P PST ADULT - PROBLEM SELECTOR PLAN 3
Pt has right hip and knee OA seeing orthopedist for pain relief with injection in a few week. Pt uses a cane to ambulate

## 2023-03-08 NOTE — H&P PST ADULT - PROBLEM SELECTOR PLAN 2
Pt instructed to take Lantus the night before and no other oral glycemics am of surgery. Pt will be monitor FS pre and post procedure and F/U with PCP

## 2023-03-08 NOTE — H&P PST ADULT - PROBLEM SELECTOR PLAN 5
Scheduled for right extracorporeal shock wave lithotripsy.    Pt instructed to be NPO the night before and the morning of surgery except for meds with sip of water. Provided with chlorhexidene 4% solution to wash for 3 days including the morning of surgery. Written instructions given and review for understanding with patient. Escort required postprocedure. Tylenol only to use before surgery,    Stop bang score=3 Pt intermediate risk for SALLIE

## 2023-03-14 PROBLEM — M19.90 UNSPECIFIED OSTEOARTHRITIS, UNSPECIFIED SITE: Chronic | Status: ACTIVE | Noted: 2023-03-08

## 2023-03-17 ENCOUNTER — NON-APPOINTMENT (OUTPATIENT)
Age: 58
End: 2023-03-17

## 2023-03-17 ENCOUNTER — OUTPATIENT (OUTPATIENT)
Dept: OUTPATIENT SERVICES | Facility: HOSPITAL | Age: 58
LOS: 1 days | End: 2023-03-17
Payer: COMMERCIAL

## 2023-03-17 ENCOUNTER — APPOINTMENT (OUTPATIENT)
Dept: PHYSICAL MEDICINE AND REHAB | Facility: CLINIC | Age: 58
End: 2023-03-17
Payer: MEDICARE

## 2023-03-17 VITALS — BODY MASS INDEX: 35.55 KG/M2 | HEIGHT: 69 IN | WEIGHT: 240 LBS

## 2023-03-17 DIAGNOSIS — F17.200 NICOTINE DEPENDENCE, UNSPECIFIED, UNCOMPLICATED: ICD-10-CM

## 2023-03-17 DIAGNOSIS — M25.551 PAIN IN RIGHT HIP: ICD-10-CM

## 2023-03-17 DIAGNOSIS — Z98.890 OTHER SPECIFIED POSTPROCEDURAL STATES: Chronic | ICD-10-CM

## 2023-03-17 DIAGNOSIS — M16.11 UNILATERAL PRIMARY OSTEOARTHRITIS, RIGHT HIP: ICD-10-CM

## 2023-03-17 DIAGNOSIS — N28.89 OTHER SPECIFIED DISORDERS OF KIDNEY AND URETER: Chronic | ICD-10-CM

## 2023-03-17 PROCEDURE — 20610 DRAIN/INJ JOINT/BURSA W/O US: CPT | Mod: RT

## 2023-03-17 PROCEDURE — 77002 NEEDLE LOCALIZATION BY XRAY: CPT | Mod: 26

## 2023-03-17 PROCEDURE — 20610 DRAIN/INJ JOINT/BURSA W/O US: CPT

## 2023-03-17 PROCEDURE — 77002 NEEDLE LOCALIZATION BY XRAY: CPT

## 2023-03-17 NOTE — REASON FOR VISIT
[Home] : at home, [unfilled] , at the time of the visit. [Medical Office: ([place] [facility])___] : at the medical office located in  [Verbal consent obtained from patient] : the patient, [unfilled] [Initial Evaluation] : an initial evaluation visit [Review of Eligibility] : review of eligibility [Low-Dose CT Screening Discussion] : low-dose CT lung cancer screening discussion [Virtual Visit] : virtual visit

## 2023-03-17 NOTE — PROCEDURE
[de-identified] : RIGHT HIP INTRAARTICULAR JOINT INJECTION WITH FLUOROSCOPIC GUIDANCE\par \par Injectate: 40mg Triamcinolone and 0.25% Bupivicaine 4mL to a total of 5mL\par \par Complications: None\par \par Estimated Blood Loss: None\par \par Technique: After informed consent was obtained, the patient was taken to the operating room and placed on the table in the supine position. All pressure points were supported and this procedure was done under local anesthesia.\par \par The patient's right hip was prepped with chlorhexidine and draped in standard sterile manner. The entirety of the procedure was done under sterile technique using sterile gloves, surgical mask and cap and all medication expiration dates were verified prior to being drawn into syringes.\par \par Utilizing AP fluoroscopy the right hip joint was visualized. Target sites was the right intraarticular joint space. Skin wheal was created at the target site using a 25G 1-1/2 inch needle and 3 mL of preservative free 1% lidocaine. Following this, 3-1/2-inch 25G spinal needle was inserted until bony contact at the femoral neck.  After negative aspiration for heme the above mentioned injectate was easily injected. \par \par The patient tolerated the procedure well and was taken to phase II recovery in stable condition with bilateral lower extremity motor and sensory intact.\par

## 2023-03-17 NOTE — HISTORY OF PRESENT ILLNESS
[Current] : Current [TextBox_13] : Referred by Dr. Moran\par \par Mr. RAFAT MORAN is a 57 year old man with a history of nicotine dependence\par \par  Over the telephone today we reviewed and confirmed that the patient meets screening eligibility criteria:\par \par -Age: 57 years old\par \par Smoking status:\par \par -Current smoker\par \par -Number of pack(s) per day: .5-1\par \par -Number of years smoked:43\par \par -Number of pack years smokin\par \par \par \par Mr. MORAN denies any personal history of lung cancer. Denies any s/s of lung cancer. No lung cancer in a 1st degree relative. Denies any history of lung disease. Denies any history of occupational exposures\par  [PacksperDay] : .75 [N_Years] : 43 [PacksperYear] : 32

## 2023-03-17 NOTE — PLAN
[Smoking Cessation] : smoking cessation [FreeTextEntry1] : LDCT scheduled for 3/18/23 at [facility]

## 2023-03-18 ENCOUNTER — APPOINTMENT (OUTPATIENT)
Dept: CT IMAGING | Facility: IMAGING CENTER | Age: 58
End: 2023-03-18
Payer: MEDICARE

## 2023-03-18 ENCOUNTER — OUTPATIENT (OUTPATIENT)
Dept: OUTPATIENT SERVICES | Facility: HOSPITAL | Age: 58
LOS: 1 days | End: 2023-03-18
Payer: COMMERCIAL

## 2023-03-18 DIAGNOSIS — Z98.890 OTHER SPECIFIED POSTPROCEDURAL STATES: Chronic | ICD-10-CM

## 2023-03-18 DIAGNOSIS — N28.89 OTHER SPECIFIED DISORDERS OF KIDNEY AND URETER: Chronic | ICD-10-CM

## 2023-03-18 DIAGNOSIS — Z72.0 TOBACCO USE: ICD-10-CM

## 2023-03-18 PROCEDURE — 71271 CT THORAX LUNG CANCER SCR C-: CPT

## 2023-03-18 PROCEDURE — 71271 CT THORAX LUNG CANCER SCR C-: CPT | Mod: 26

## 2023-03-20 ENCOUNTER — TRANSCRIPTION ENCOUNTER (OUTPATIENT)
Age: 58
End: 2023-03-20

## 2023-03-20 RX ORDER — SODIUM CHLORIDE 9 MG/ML
3 INJECTION INTRAMUSCULAR; INTRAVENOUS; SUBCUTANEOUS EVERY 8 HOURS
Refills: 0 | Status: DISCONTINUED | OUTPATIENT
Start: 2023-03-21 | End: 2023-04-04

## 2023-03-21 ENCOUNTER — TRANSCRIPTION ENCOUNTER (OUTPATIENT)
Age: 58
End: 2023-03-21

## 2023-03-21 ENCOUNTER — OUTPATIENT (OUTPATIENT)
Dept: OUTPATIENT SERVICES | Facility: HOSPITAL | Age: 58
LOS: 1 days | End: 2023-03-21
Payer: MEDICARE

## 2023-03-21 VITALS
TEMPERATURE: 98 F | OXYGEN SATURATION: 98 % | RESPIRATION RATE: 16 BRPM | HEART RATE: 63 BPM | DIASTOLIC BLOOD PRESSURE: 73 MMHG | SYSTOLIC BLOOD PRESSURE: 126 MMHG

## 2023-03-21 VITALS
RESPIRATION RATE: 16 BRPM | SYSTOLIC BLOOD PRESSURE: 138 MMHG | HEIGHT: 69 IN | HEART RATE: 89 BPM | OXYGEN SATURATION: 98 % | DIASTOLIC BLOOD PRESSURE: 73 MMHG | WEIGHT: 238.1 LBS | TEMPERATURE: 98 F

## 2023-03-21 DIAGNOSIS — N28.89 OTHER SPECIFIED DISORDERS OF KIDNEY AND URETER: Chronic | ICD-10-CM

## 2023-03-21 DIAGNOSIS — N20.0 CALCULUS OF KIDNEY: ICD-10-CM

## 2023-03-21 DIAGNOSIS — Z98.890 OTHER SPECIFIED POSTPROCEDURAL STATES: Chronic | ICD-10-CM

## 2023-03-21 LAB
GLUCOSE BLDC GLUCOMTR-MCNC: 137 MG/DL — HIGH (ref 70–99)
GLUCOSE BLDC GLUCOMTR-MCNC: 165 MG/DL — HIGH (ref 70–99)

## 2023-03-21 PROCEDURE — 82962 GLUCOSE BLOOD TEST: CPT

## 2023-03-21 PROCEDURE — 50590 FRAGMENTING OF KIDNEY STONE: CPT | Mod: RT

## 2023-03-21 RX ORDER — FENTANYL CITRATE 50 UG/ML
25 INJECTION INTRAVENOUS
Refills: 0 | Status: DISCONTINUED | OUTPATIENT
Start: 2023-03-21 | End: 2023-03-21

## 2023-03-21 RX ORDER — SODIUM CHLORIDE 9 MG/ML
1000 INJECTION, SOLUTION INTRAVENOUS
Refills: 0 | Status: DISCONTINUED | OUTPATIENT
Start: 2023-03-21 | End: 2023-03-21

## 2023-03-21 RX ORDER — ACETAMINOPHEN 500 MG
1000 TABLET ORAL ONCE
Refills: 0 | Status: DISCONTINUED | OUTPATIENT
Start: 2023-03-21 | End: 2023-03-21

## 2023-03-21 RX ORDER — SODIUM CHLORIDE 9 MG/ML
1000 INJECTION, SOLUTION INTRAVENOUS
Refills: 0 | Status: DISCONTINUED | OUTPATIENT
Start: 2023-03-21 | End: 2023-04-04

## 2023-03-21 RX ORDER — FENTANYL CITRATE 50 UG/ML
50 INJECTION INTRAVENOUS
Refills: 0 | Status: DISCONTINUED | OUTPATIENT
Start: 2023-03-21 | End: 2023-03-21

## 2023-03-21 NOTE — ASU PATIENT PROFILE, ADULT - FALL HARM RISK - UNIVERSAL INTERVENTIONS
Bed in lowest position, wheels locked, appropriate side rails in place/Call bell, personal items and telephone in reach/Instruct patient to call for assistance before getting out of bed or chair/Non-slip footwear when patient is out of bed/[place] to call system/Physically safe environment - no spills, clutter or unnecessary equipment/Purposeful Proactive Rounding/Room/bathroom lighting operational, light cord in reach

## 2023-03-21 NOTE — ASU DISCHARGE PLAN (ADULT/PEDIATRIC) - CARE PROVIDER_API CALL
Danny Martines)  Urology  [address], [address] [place] [address]  Phone: [phone]  Fax: [phone]  Established Patient  Follow Up Time: 2 weeks

## 2023-03-21 NOTE — ASU DISCHARGE PLAN (ADULT/PEDIATRIC) - NS MD DC FALL RISK RISK
For information on Fall & Injury Prevention, visit: [email or web address] OR  [email or web address] OR  https://www.cdc.gov/steadi/patient.html

## 2023-03-21 NOTE — ASU DISCHARGE PLAN (ADULT/PEDIATRIC) - ASU DC SPECIAL INSTRUCTIONSFT
You may have pain and bruising at the sight of the procedure. Please take Tylenol for pain.    Follow up with Dr. Martines in approximately two weeks. Please use a urine strainer to strain all of your urine to keep any stone fragments, which you can bring to your visit.

## 2023-03-21 NOTE — ASU PATIENT PROFILE, ADULT - PRO MENTAL HEALTH SX RECENT
Heart failure assessment reviewed     No new or worsening heart failure symptoms noted   Vital signs review and remained relatively stable with the exception of weights trending down.      No changes   Thank you   none

## 2023-03-21 NOTE — ASU DISCHARGE PLAN (ADULT/PEDIATRIC) - CALL YOUR DOCTOR IF YOU HAVE ANY OF THE FOLLOWING:
Pain not relieved by Medications/Nausea and vomiting that does not stop/Unable to urinate/Inability to tolerate liquids or foods Bleeding that does not stop/Pain not relieved by Medications/Fever greater than (need to indicate Fahrenheit or Celsius)/Nausea and vomiting that does not stop/Unable to urinate/Inability to tolerate liquids or foods

## 2023-03-27 PROBLEM — I70.213 ATHEROSCLEROSIS OF NATIVE ARTERY OF BOTH LOWER EXTREMITIES WITH INTERMITTENT CLAUDICATION: Status: ACTIVE | Noted: 2023-03-04

## 2023-03-27 PROBLEM — F33.1 MODERATE EPISODE OF RECURRENT MAJOR DEPRESSIVE DISORDER: Status: ACTIVE | Noted: 2023-03-04

## 2023-03-27 PROBLEM — F20.9 SCHIZOPHRENIA: Status: ACTIVE | Noted: 2023-03-04

## 2023-03-27 NOTE — PHYSICAL EXAM
[de-identified] : Telehealth precludes traditional, comprehensive physical exam. Patient appeared stable and alert.

## 2023-03-27 NOTE — HISTORY OF PRESENT ILLNESS
[de-identified] : [facility] quality initiative provider note: 57 year old male with history of Bipolar, schizophrenia, Crohn's disease, HLD, HTN, DJD of bilateral hips, DMII with neuropathy, Renal cell carcinoma (tx surgery resection of left kidney), rhinitis. Patient report doing well beside dealing with chronic right hip and knee pain. Patient last seen ortho 1/27/23 but state injects/PO medication are not help, the pain interfering with ADLs/IADLs. Patient have a follow up appt. Awake, alert and oriented x4, in no acute distress. Denies any chest pain, shortness of breath, palpitation or dizziness. Patient verified medications and medical diagnosis. Report taking medications as prescribed. \par \par PCP - Yesenia Flower, last seen 2/2023\par \par Depression/Bipolar/schizophrenia - patient confirm dx, followed by Dr. Jay Lee (psychiatrist) and Dr. Rogelio Forbes (therapist) at [facility] via Zoom. Patient meets with Dr. Lee every other month and Dr. Forbes biweekly. Patient denies any SI/HI/Hallucination, PHQ9 score today 14. Patient report taking medication as prescribed, on aripiprazole, bupropion, trazodone\par \par Crohn's disease - patient confirm dx, follow by GI provider (name unknown), scheduled for endoscopy April 2023, Colonoscopy done 2 years ago\par \par Diabetes - patient confirmed dx, check BS daily, AM BS range 100 - 120, today , followed by endo (name unknown), report taking medication as prescribed. Patient report poor circulation b/l lower extremities however right foot worst. Patient report muscle pain triggered by activity but relieve with rest. Patient sees Dr. Sheriff (cardio)

## 2023-03-27 NOTE — REVIEW OF SYSTEMS
[Chest Pain] : no chest pain [Palpitations] : no palpitations [Lower Ext Edema] : no lower extremity edema [Shortness Of Breath] : no shortness of breath [Suicidal] : not suicidal [Anxiety] : no anxiety

## 2023-03-27 NOTE — ED ADULT TRIAGE NOTE - ARRIVAL FROM
Pt presents A&Ox4, ABCs intact, MSPs x4 intact, with complaint of post-op right ankle pain. Pt has follow up appointment scheduled for 3/28/2023. Pt stated \"my doctor said this can wait till Tuesday, but I wanted to be scene\". Pt significant other at bedside. No other complaints at this time.
Home

## 2023-03-27 NOTE — HEALTH RISK ASSESSMENT
[de-identified] : unable to exercise due to right hip and knee pain [de-identified] : good [de-identified] : cane [de-identified] : Refer to  flowsheet [AdvancecareDate] : 03/23

## 2023-03-28 ENCOUNTER — APPOINTMENT (OUTPATIENT)
Dept: PULMONOLOGY | Facility: CLINIC | Age: 58
End: 2023-03-28
Payer: MEDICARE

## 2023-03-28 VITALS
RESPIRATION RATE: 14 BRPM | HEART RATE: 88 BPM | HEIGHT: 69 IN | SYSTOLIC BLOOD PRESSURE: 110 MMHG | TEMPERATURE: 97.5 F | BODY MASS INDEX: 34.96 KG/M2 | WEIGHT: 236 LBS | OXYGEN SATURATION: 97 % | DIASTOLIC BLOOD PRESSURE: 68 MMHG

## 2023-03-28 DIAGNOSIS — R91.1 SOLITARY PULMONARY NODULE: ICD-10-CM

## 2023-03-28 PROCEDURE — 99406 BEHAV CHNG SMOKING 3-10 MIN: CPT

## 2023-03-28 PROCEDURE — 99214 OFFICE O/P EST MOD 30 MIN: CPT | Mod: 25

## 2023-03-28 RX ORDER — NICOTINE POLACRILEX 2 MG/1
2 LOZENGE ORAL
Qty: 1 | Refills: 1 | Status: ACTIVE | COMMUNITY
Start: 2023-03-28 | End: 1900-01-01

## 2023-03-28 NOTE — HISTORY OF PRESENT ILLNESS
[TextBox_4] : 58YO Male active smoker PMH Bipolar, HLD, HTN, DJD of bilateral hips, DMII, Renal cell carcinoma (tx surgery resection of left kidney) and rhinitis who presents for initial visit for cough.\par \par He states that he has had a cough that has been going on for 6mons to 1year. He has tested himself for COVID multiple times which has been negative. He states his cough is productive of yellow-green sputum. he denies fevers, chills. He has noticed weight gain since starting insulin but has not had weight loss. He denies night sweats. He has noticed SOB when walking down 1 flight of stairs and walking. He states he can walk 10 city blocks before having to stop for SOB.\par \par He has had some benefit since starting the azelasine and flonase nasal spray. \par \par He saw Dr. Kirkland in 2016 and had PFTs showing mild obstruction. He had a CXR showing bilateral lower lobe infiltrates and was recommended a Chest CT. \par \par Social: Tobacco: since 15 ranging from 3cigs to 1ppd and currently 1/2 pack per day, Denies vaping Alcohol: Rare occasions Drugs: Denies\par Occupation: Cook in a kitchen, PCA at [employer], currently unemployed\par Exposure: Denies, Has a cat never birds\par Surgical Hx: Right meniscus tear, Left kidney resection RCC\par Family Hx: Heart failure, Diabetes. \par Smoking Status: current, >= 20 pack years \par # Packs per day: 0.5 \par # Years: 43 \par eCigarettes: never \par Marijuana use: never \par \par \par He was initially seen in February 2023 and was referred for a lung cancer screening Ct chest. He was given samples of Anoro and counseled on smoking cessation. \par CT screening 3/9/23 noted a 1mm nodule in RUL. \par \par He doesn’t like Anoro because it leaves a bad taste in his mouth, but has continued to use it. He feels his cough and breathing have improved. \par Smokes about 3-5 cigarettes  every 2-3 days.

## 2023-03-28 NOTE — ASSESSMENT
[FreeTextEntry1] : 56YO Male active smoker PMH Bipolar, HLD, HTN, DJD of bilateral hips, DMII, Renal cell carcinoma (tx surgery resection of left kidney) and rhinitis who presents for initial visit for cough and shortness of breath. Anoro has improved his sx, but he reports it leaves a bad taste in his mouth.\par Pt did not desaturate on ambulatory pulse ox in office \par PFTs within normal limits\par \par #SOB on exertion\par #Active smoking\par - Discussed smoking cessation again. Was given resource pamphlet and said he will try to reach out to them.\par - We sent rx for nicotine replacement lozenges \par - Follow up CT chest in 12 months\par - Continue Anoro if tolerated. His lung function was normal. Will send rx to pharmacy for formulary LAMA inhaler. \par We think its most beneficial that he stops smoking. \par \par follow up in 3 months

## 2023-03-28 NOTE — PHYSICAL EXAM
[No Acute Distress] : no acute distress [Well Nourished] : well nourished [Well Developed] : well developed [Normal Rate/Rhythm] : normal rate/rhythm [Normal S1, S2] : normal s1, s2 [No Resp Distress] : no resp distress [Clear to Auscultation Bilaterally] : clear to auscultation bilaterally

## 2023-04-03 ENCOUNTER — APPOINTMENT (OUTPATIENT)
Dept: PHYSICAL MEDICINE AND REHAB | Facility: CLINIC | Age: 58
End: 2023-04-03
Payer: MEDICARE

## 2023-04-03 VITALS — DIASTOLIC BLOOD PRESSURE: 79 MMHG | SYSTOLIC BLOOD PRESSURE: 142 MMHG | HEART RATE: 89 BPM | OXYGEN SATURATION: 96 %

## 2023-04-03 PROCEDURE — 99213 OFFICE O/P EST LOW 20 MIN: CPT

## 2023-04-03 NOTE — REVIEW OF SYSTEMS
[Chest Pain] : no chest pain [Shortness Of Breath] : no shortness of breath [Abdominal Pain] : no abdominal pain [Dysuria] : no dysuria [Negative] : Constitutional [FreeTextEntry9] : right hip and knee pains

## 2023-04-03 NOTE — PHYSICAL EXAM
[FreeTextEntry1] : Mr. RAFAT KANG is a 57 year M with pain in the right hip due to hip osteoarthritis. Improved with injection.\par \par Patient reassured and educated on the diagnosis and treatment options. Risks and benefits of treatment and of delaying treatment discussed with patient. Risks discussed include but not limited to: progression of symptoms, worsening pain and functional status, etc.\par \par Again discussed XR bilateral hips. Imaging and report demonstrate: severe right hip DJD.\par Advised patient to speak to his orthopedic surgeon to discuss future right hip replacement.\par \par Follow up with Dr. Alvarez for knee pains\par Follow up 3-6 months\par \par Patient was advised if the following symptoms develop: chills, fever, loss of bladder control, bowel incontinence or urinary retention, numbness/tingling or weakness is present in upper or lower extremities, to go to the nearest emergency room. This may be a new clinical condition not present at the time of the patient visit that may lead to paralysis and/or death. Patient advised if the above symptoms developed to also call the office immediately to inform us and to go to the nearest emergency room.\par \par This note was generated using Dragon medical dictation software. A reasonable effort had been made for proofreading its contents, but spelling mistakes or grammatical errors may still remain. If there are any questions or points of clarification needed please notify my office.

## 2023-04-03 NOTE — HISTORY OF PRESENT ILLNESS
[FreeTextEntry1] : RAFAT KANG is s/p right hip steroid injection with fluoroscopic guidance on 9/30/22 and now most recently on 3/17/23. He is again reporting over 80% reduction in pain as tested by the [facility] and is very happy. Patient is mainly reporting hip pressure now but is able to walk around without any issues. He wishes to hold of seeing ortho joint for hip eval at this time.\par \par Denies any new pain, numbness or weakness, bowel/bladder dysfunction, saddle anesthesia, fevers, chills, weight loss, night pain, or night sweats at this time.

## 2023-05-18 ENCOUNTER — APPOINTMENT (OUTPATIENT)
Dept: ORTHOPEDIC SURGERY | Facility: CLINIC | Age: 58
End: 2023-05-18
Payer: MEDICARE

## 2023-05-18 PROCEDURE — 99213 OFFICE O/P EST LOW 20 MIN: CPT

## 2023-05-24 ENCOUNTER — APPOINTMENT (OUTPATIENT)
Dept: PHYSICAL MEDICINE AND REHAB | Facility: CLINIC | Age: 58
End: 2023-05-24
Payer: MEDICARE

## 2023-05-24 VITALS — SYSTOLIC BLOOD PRESSURE: 143 MMHG | HEART RATE: 76 BPM | OXYGEN SATURATION: 95 % | DIASTOLIC BLOOD PRESSURE: 84 MMHG

## 2023-05-24 PROCEDURE — 99214 OFFICE O/P EST MOD 30 MIN: CPT

## 2023-05-24 NOTE — PHYSICAL EXAM
[FreeTextEntry1] : General exam \par \par Constitutional: The patient appears well-developed, well-nourished, and in no apparent distress. Patient is well-groomed.  \par \par Skin: The skin is warm and dry, with normal turgor.  No rashes or lesions are noted.  \par \par Eyes: PERRL.  \par \par ENMT: Ears: Hearing is grossly within normal limits.  \par \par Neck: Supple: The neck is supple.  \par \par Respiratory: Inspection: Breathing unlabored.  \par \par Neurologic: Alert and oriented x 3. \par \par Psychiatric: Patient is cooperative and appropriate.  Mood and affect are normal.  Patient's insight is good, and memory and judgment are intact.\par \par Antalgic gait with SC

## 2023-05-24 NOTE — ASSESSMENT
[FreeTextEntry1] : Mr. RAFAT KANG is a 57 year M with pain in the right hip. He reports chronic long standing pain and is noting an acute on chronic exacerbation of this pain due to hip osteoarthritis. He gets pain relief with steroid injections.\par \par Patient reassured and educated on the diagnosis and treatment options. Risks and benefits of treatment and of delaying treatment discussed with patient. Risks discussed include but not limited to: progression of symptoms, worsening pain and functional status, etc.\par \par Dr. Alvarez's note reviewed in HPI\par Continue follow up with Dr. Alvarez\par \par Patient had tried and failed conservative treatment. This includes but is not limited to: Acetaminophen, NSAIDs, muscle relaxants, neuropathic medications, physician directed home exercises and/or physical therapy for at least 8 weeks. After a thorough discussion of risks and benefits patient would like to proceed with RIGHT HIP INTRAARTICULAR STEROID INJECTION WITH FLUOROSCOPIC GUIDANCE\par \par Risks and benefits of having injection discussed with patient. Risks discussed included, but not limited to: pain, infection, bleeding requiring emergency surgery, headaches, damage to vital organs, etc.\par \par Based on the history, physical exam and diagnostic findings, patient will benefit from this procedure. The goal of this procedure is to reduce pain and inflammation, improve function and range of motion and to further promote increased activity and return to previous level of functioning. The ultimate goal of performing this procedure is to improve patient's quality of life.\par \par Asking for authorization for RIGHT HIP INTRAARTICULAR STEROID INJECTION WITH FLUOROSCOPIC GUIDANCE to help treat patient´s pain. Patient will be scheduled for this procedure.\par \par I have personally spent a total of at least 35 minutes preparing, reviewing internal and external records, explaining, counseling, and coordinating care for this patient encounter.\par \par Patient would like to hold off THR at this time\par He would also like to have the joint injection before opting for hip nerve block and RFA\par \par Patient was advised if the following symptoms develop: chills, fever, loss of bladder control, bowel incontinence or urinary retention, numbness/tingling or weakness is present in upper or lower extremities, to go to the nearest emergency room. This may be a new clinical condition not present at the time of the patient visit that may lead to paralysis and/or death. Patient advised if the above symptoms developed to also call the office immediately to inform us and to go to the nearest emergency room.\par \par This note was generated using Dragon medical dictation software. A reasonable effort had been made for proofreading its contents, but spelling mistakes or grammatical errors may still remain. If there are any questions or points of clarification needed please notify my office.

## 2023-05-24 NOTE — HISTORY OF PRESENT ILLNESS
[FreeTextEntry1] : RAFAT KANG is here for follow up. Since last visit he had seen Dr. Antonio Lion on 5/18/23: "57M w/ severe R hip DJD leading to R knee pain. He has failed multiple conservative options to treat his right knee pain. I have recommended R SHABANA, however the patient says he is not yet ready for major surgery. I rec'd instead that he FU with his pain management specialist to inquire about nerve cryoablation procedures. FU when he is ready to proceed with SHABANA."\par \par Patient notes that he understands that he will need a hip replacement but he is not ready at this time. He wishes to repeat the right hip joint steroid injection before trying the nerve block and possible RFA. Pain is returning to the same levels he had before, around 7/10 on NRS.\par \par Denies any new pain, numbness or weakness, bowel/bladder dysfunction, saddle anesthesia, fevers, chills, weight loss, night pain, or night sweats at this time.\par \par From last visit on 4/3/23:\par \par RAFAT KANG is s/p right hip steroid injection with fluoroscopic guidance on 9/30/22 and now most recently on 3/17/23. He is again reporting over 80% reduction in pain as tested by the NRS and is very happy. Patient is mainly reporting hip pressure now but is able to walk around without any issues. He wishes to hold of seeing ortho joint for hip eval at this time.\par \par Denies any new pain, numbness or weakness, bowel/bladder dysfunction, saddle anesthesia, fevers, chills, weight loss, night pain, or night sweats at this time.

## 2023-06-09 ENCOUNTER — APPOINTMENT (OUTPATIENT)
Dept: ORTHOPEDIC SURGERY | Facility: CLINIC | Age: 58
End: 2023-06-09

## 2023-06-16 ENCOUNTER — OUTPATIENT (OUTPATIENT)
Dept: OUTPATIENT SERVICES | Facility: HOSPITAL | Age: 58
LOS: 1 days | End: 2023-06-16
Payer: COMMERCIAL

## 2023-06-16 ENCOUNTER — APPOINTMENT (OUTPATIENT)
Dept: PHYSICAL MEDICINE AND REHAB | Facility: CLINIC | Age: 58
End: 2023-06-16
Payer: MEDICARE

## 2023-06-16 DIAGNOSIS — Z98.890 OTHER SPECIFIED POSTPROCEDURAL STATES: Chronic | ICD-10-CM

## 2023-06-16 DIAGNOSIS — M25.551 PAIN IN RIGHT HIP: ICD-10-CM

## 2023-06-16 DIAGNOSIS — N28.89 OTHER SPECIFIED DISORDERS OF KIDNEY AND URETER: Chronic | ICD-10-CM

## 2023-06-16 DIAGNOSIS — M16.11 UNILATERAL PRIMARY OSTEOARTHRITIS, RIGHT HIP: ICD-10-CM

## 2023-06-16 PROCEDURE — 77002 NEEDLE LOCALIZATION BY XRAY: CPT | Mod: 26

## 2023-06-16 PROCEDURE — 20610 DRAIN/INJ JOINT/BURSA W/O US: CPT

## 2023-06-16 PROCEDURE — 77002 NEEDLE LOCALIZATION BY XRAY: CPT

## 2023-06-16 PROCEDURE — 20610 DRAIN/INJ JOINT/BURSA W/O US: CPT | Mod: RT

## 2023-06-16 NOTE — PROCEDURE
[de-identified] : RIGHT HIP INTRAARTICULAR JOINT INJECTION WITH FLUOROSCOPIC GUIDANCE\par \par Injectate: 40mg Triamcinolone and 0.25% Bupivicaine 4mL to a total of 5mL\par \par Complications: None\par \par Estimated Blood Loss: None\par \par Technique: After informed consent was obtained, the patient was taken to the operating room and placed on the table in the supine position. All pressure points were supported and this procedure was done under local anesthesia.\par \par The patient's right hip was prepped with chlorhexidine and draped in standard sterile manner. The entirety of the procedure was done under sterile technique using sterile gloves, surgical mask and cap and all medication expiration dates were verified prior to being drawn into syringes.\par \par Utilizing AP fluoroscopy the right hip joint was visualized. Target sites was the right intraarticular joint space. Skin wheal was created at the target site using a 25G 1-1/2 inch needle and 3 mL of preservative free 1% lidocaine. Following this, 3-1/2-inch 25G spinal needle was inserted until bony contact at the femoral neck. After negative aspiration for heme the above mentioned injectate was easily injected. \par \par The patient tolerated the procedure well and was taken to phase II recovery in stable condition with bilateral lower extremity motor and sensory intact.

## 2023-06-20 ENCOUNTER — APPOINTMENT (OUTPATIENT)
Dept: PULMONOLOGY | Facility: CLINIC | Age: 58
End: 2023-06-20
Payer: MEDICARE

## 2023-06-20 VITALS
RESPIRATION RATE: 15 BRPM | DIASTOLIC BLOOD PRESSURE: 90 MMHG | WEIGHT: 242.5 LBS | TEMPERATURE: 97.2 F | HEART RATE: 83 BPM | SYSTOLIC BLOOD PRESSURE: 155 MMHG | OXYGEN SATURATION: 96 % | BODY MASS INDEX: 35.92 KG/M2 | HEIGHT: 69 IN

## 2023-06-20 PROCEDURE — 99214 OFFICE O/P EST MOD 30 MIN: CPT

## 2023-06-20 RX ORDER — FLUTICASONE PROPIONATE 50 UG/1
50 SPRAY, METERED NASAL TWICE DAILY
Qty: 1 | Refills: 4 | Status: ACTIVE | COMMUNITY
Start: 2023-06-20 | End: 1900-01-01

## 2023-06-20 NOTE — ASSESSMENT
[FreeTextEntry1] : 56YO Male active smoker PMH Bipolar, HLD, HTN, DJD of bilateral hips, DMII, Renal cell carcinoma (tx surgery resection of left kidney) and rhinitis who presents for ifollow up today.  REpports that he continues to smoke- having difficulty quitting, but he has cut back.  Having cough productive of sticky sputum.  FEels as if there is someithing in the back of his throat.  Also reports feeling sleepy , snoring and wonders if he has SALLIE.  He has a crowded oropharynx.  \par Pt did not desaturate on ambulatory pulse ox in office \par PFTs within normal limits\par \par Plan:\par - Fluticasone and prilosec for cough\par - trial of stopping anoro- instructed to resume if he feels more SOB off it. \par - Follow up CT chest in 12 months- ordered for 3/2023\par \par follow after CT in 3/2024.

## 2023-06-20 NOTE — HISTORY OF PRESENT ILLNESS
[TextBox_4] : 56YO Male active smoker PMH Bipolar, HLD, HTN, DJD of bilateral hips, DMII, Renal cell carcinoma (tx surgery resection of left kidney) and rhinitis who presents for follow up.  \par \par Primary complaint is cough with sputum production.  Has been on Anoro Ellipta daily- does not really notice any difference in his breathing.  INitially reported some SOB, now denies.\par \par PFTs in 2/2023 were normal.  \par \par He saw Dr. Kirkland in 2016 and had PFTs showing mild obstruction. He had a CXR showing bilateral lower lobe infiltrates and was recommended a Chest CT. \par \par Social: Tobacco: since 15 ranging from 3cigs to 1ppd and currently 1/2 pack per day, Denies vaping Alcohol: Rare occasions Drugs: Denies\par Occupation: Cook in a kitchen, PCA at [employer], currently unemployed\par Exposure: Denies, Has a cat never birds\par Surgical Hx: Right meniscus tear, Left kidney resection RCC\par Family Hx: Heart failure, Diabetes. \par Smoking Status: current, >= 20 pack years \par # Packs per day: 0.5 \par # Years: 43 \par eCigarettes: never \par Marijuana use: never \par \par \par CT screening 3/9/23 noted a 1mm nodule in RUL. \par \par He coughs up phlegm its yellow- green to clear.  Jelly like consistency.  Denies shortness of breath.\par \par Using Anoro daily.  doesn't know if the medication has made him feel better. \par Reports feelin as if there is something in the back of the throat.  \par Saw GI . who told himthat he has an ulcer in his esophagus and stomach.\par \par

## 2023-06-20 NOTE — PHYSICAL EXAM
[No Acute Distress] : no acute distress [III] : Mallampati Class: III [Normal Appearance] : normal appearance [No Neck Mass] : no neck mass [Normal Rate/Rhythm] : normal rate/rhythm [Normal S1, S2] : normal s1, s2 [No Murmurs] : no murmurs [No Resp Distress] : no resp distress [Clear to Auscultation Bilaterally] : clear to auscultation bilaterally [No Abnormalities] : no abnormalities [Normal Gait] : normal gait [No Clubbing] : no clubbing [No Cyanosis] : no cyanosis [No Edema] : no edema [Oriented x3] : oriented x3 [Normal Affect] : normal affect

## 2023-07-05 ENCOUNTER — APPOINTMENT (OUTPATIENT)
Dept: PHYSICAL MEDICINE AND REHAB | Facility: CLINIC | Age: 58
End: 2023-07-05
Payer: MEDICARE

## 2023-07-05 PROCEDURE — 99213 OFFICE O/P EST LOW 20 MIN: CPT

## 2023-07-05 NOTE — ASSESSMENT
[FreeTextEntry1] : Mr. RAFAT KANG is a 57 year M with pain in the right hip due to hip osteoarthritis. He gets pain relief with steroid injections.\par \par Patient reassured and educated on the diagnosis and treatment options. Risks and benefits of treatment and of delaying treatment discussed with patient. Risks discussed include but not limited to: progression of symptoms, worsening pain and functional status, etc.\par \par I have personally spent a total of at least 25 minutes preparing, reviewing internal and external records, explaining, counseling, and coordinating care for this patient encounter.\par \par Patient would like to hold off THR at this time\par He would also like to have the joint injection before opting for hip nerve block and RFA\par Follow up 3 months\par \par Patient was advised if the following symptoms develop: chills, fever, loss of bladder control, bowel incontinence or urinary retention, numbness/tingling or weakness is present in upper or lower extremities, to go to the nearest emergency room. This may be a new clinical condition not present at the time of the patient visit that may lead to paralysis and/or death. Patient advised if the above symptoms developed to also call the office immediately to inform us and to go to the nearest emergency room.\par \par This note was generated using Dragon medical dictation software. A reasonable effort had been made for proofreading its contents, but spelling mistakes or grammatical errors may still remain. If there are any questions or points of clarification needed please notify my office.

## 2023-07-05 NOTE — REVIEW OF SYSTEMS
[Abdominal Pain] : no abdominal pain [Dysuria] : no dysuria [Negative] : Constitutional [FreeTextEntry9] : right hip and knee pains

## 2023-07-05 NOTE — HISTORY OF PRESENT ILLNESS
[FreeTextEntry1] : RAFAT KANG had RIGHT HIP INTRAARTICULAR JOINT INJECTION WITH FLUOROSCOPIC GUIDANCE on 6/16/23. Today patient is here for follow up. Patient reports greater than 80% reduction in pain as tested by the NRS pain scale. Patient also reports improvement in quality of life. In addition, patient reports improvement of function and ADLs along with a temporary decrease in pain medication use. He is happy and is planning to wait until the fall of 2023 to make any decisions about hip replacement.\par \par Denies any new pain, numbness or weakness, bowel/bladder dysfunction, saddle anesthesia, fevers, chills, weight loss, night pain, or night sweats at this time.\par

## 2023-07-12 NOTE — ED ADULT TRIAGE NOTE - SOURCE OF INFORMATION
Patient Azelaic Acid Counseling: Patient counseled that medicine may cause skin irritation and to avoid applying near the eyes.  In the event of skin irritation, the patient was advised to reduce the amount of the drug applied or use it less frequently.   The patient verbalized understanding of the proper use and possible adverse effects of azelaic acid.  All of the patient's questions and concerns were addressed.

## 2023-07-19 ENCOUNTER — FORM ENCOUNTER (OUTPATIENT)
Age: 58
End: 2023-07-19

## 2023-07-20 ENCOUNTER — APPOINTMENT (OUTPATIENT)
Dept: SLEEP CENTER | Facility: CLINIC | Age: 58
End: 2023-07-20
Payer: MEDICARE

## 2023-07-20 ENCOUNTER — OUTPATIENT (OUTPATIENT)
Dept: OUTPATIENT SERVICES | Facility: HOSPITAL | Age: 58
LOS: 1 days | End: 2023-07-20
Payer: COMMERCIAL

## 2023-07-20 DIAGNOSIS — Z98.890 OTHER SPECIFIED POSTPROCEDURAL STATES: Chronic | ICD-10-CM

## 2023-07-20 DIAGNOSIS — N28.89 OTHER SPECIFIED DISORDERS OF KIDNEY AND URETER: Chronic | ICD-10-CM

## 2023-07-20 PROCEDURE — 95800 SLP STDY UNATTENDED: CPT

## 2023-07-20 PROCEDURE — 95800 SLP STDY UNATTENDED: CPT | Mod: 26

## 2023-07-26 DIAGNOSIS — G47.33 OBSTRUCTIVE SLEEP APNEA (ADULT) (PEDIATRIC): ICD-10-CM

## 2023-07-27 ENCOUNTER — NON-APPOINTMENT (OUTPATIENT)
Age: 58
End: 2023-07-27

## 2023-08-14 ENCOUNTER — APPOINTMENT (OUTPATIENT)
Dept: PHYSICAL MEDICINE AND REHAB | Facility: CLINIC | Age: 58
End: 2023-08-14
Payer: MEDICARE

## 2023-08-14 VITALS — OXYGEN SATURATION: 95 % | DIASTOLIC BLOOD PRESSURE: 84 MMHG | HEART RATE: 75 BPM | SYSTOLIC BLOOD PRESSURE: 136 MMHG

## 2023-08-14 DIAGNOSIS — M23.91 UNSPECIFIED INTERNAL DERANGEMENT OF RIGHT KNEE: ICD-10-CM

## 2023-08-14 PROCEDURE — 99214 OFFICE O/P EST MOD 30 MIN: CPT

## 2023-08-14 RX ORDER — CELECOXIB 200 MG/1
200 CAPSULE ORAL TWICE DAILY
Qty: 60 | Refills: 0 | Status: ACTIVE | COMMUNITY
Start: 2023-08-14 | End: 1900-01-01

## 2023-08-14 NOTE — HISTORY OF PRESENT ILLNESS
[FreeTextEntry1] : RAFAT KANG is here for follow up. Since last visit he has been doing well with the right hip pain and taking Ibuprofen 1-2x a day. He wishes to avoid taking Ibuprofen due to stomach issues. He wishes to be evaluated for his right knee pains today. Pain is 10/10 in the right knee and he has to use a SC even arounnd his house. He previouly had MRI and right knee medical meniscus repair.   Denies any new pain, numbness or weakness, bowel/bladder dysfunction, saddle anesthesia, fevers, chills, weight loss, night pain, or night sweats at this time.

## 2023-08-14 NOTE — ASSESSMENT
[FreeTextEntry1] : Mr. RAFAT KANG is a 58 year M with pain in the right hip due to hip osteoarthritis relieved with steroid injections. Now with right knee pain due to acute on chronic exacarbation from meniscal injury and repair and DJD.    Patient reassured and educated on the diagnosis and treatment options. Risks and benefits of treatment and of delaying treatment discussed with patient. Risks discussed include but not limited to: progression of symptoms, worsening pain and functional status, etc.  Start Celebrex 200 mg PO daily x 30 days PRN pain with food #30. Denies CKD, CAD, or gastritis. Recommend that if patient develops GI symptoms including abdominal pain, nausea, or vomiting to discontinue use of medication immediately.  Patient had tried and failed conservative treatment. This includes but is not limited to: Acetaminophen, NSAIDs, muscle relaxants, neuropathic medications, physician directed home exercises and/or physical therapy for at least 8 weeks. After a thorough discussion of risks and benefits patient would like to proceed with RIGHT KNEE GENICULAR NERVE BLOCKS WITH FLUOROSCOPIC GUIDANCE WITH PLAN TO DO RADIOFREQUENCY ABLATION IN THE FUTURE..  Risks and benefits of having injection discussed with patient. Risks discussed included, but not limited to: pain, infection, bleeding requiring emergency surgery, headaches, damage to vital organs, etc.  At this time, patient appears to be psychologically stable. Based on the history, physical exam and diagnostic findings, patient will benefit from this procedure. The goal of this procedure is to reduce pain and inflammation, improve function and range of motion and to further promote increased activity and return to previous level of functioning. The ultimate goal of performing this procedure is to improve patient's quality of life.  Asking for authorization for RIGHT KNEE GENICULAR NERVE BLOCKS WITH FLUOROSCOPIC GUIDANCE WITH PLAN TO DO RADIOFREQUENCY ABLATION IN THE FUTURE.. to help treat patients pain.  Patient will be scheduled for this procedure.  I have personally spent a total of at least 35 minutes preparing, reviewing internal and external records, explaining, counseling, and coordinating care for this patient encounter.  Patient would like to hold off THR and TKRs at this time  Patient was advised if the following symptoms develop: chills, fever, loss of bladder control, bowel incontinence or urinary retention, numbness/tingling or weakness is present in upper or lower extremities, to go to the nearest emergency room. This may be a new clinical condition not present at the time of the patient visit that may lead to paralysis and/or death. Patient advised if the above symptoms developed to also call the office immediately to inform us and to go to the nearest emergency room.    This note was generated using Dragon medical dictation software. A reasonable effort had been made for proofreading its contents, but spelling mistakes or grammatical errors may still remain. If there are any questions or points of clarification needed please notify my office.

## 2023-08-14 NOTE — REVIEW OF SYSTEMS
[Abdominal Pain] : no abdominal pain [Dysuria] : no dysuria [Negative] : Constitutional [FreeTextEntry9] : hip and knee pain

## 2023-08-14 NOTE — PHYSICAL EXAM
[FreeTextEntry1] : General exam   Constitutional: The patient appears well-developed, well-nourished, and in no apparent distress. Patient is well-groomed.    Skin: The skin is warm and dry, with normal turgor.  Eyes: PERRL.    ENMT: Ears: Hearing is grossly within normal limits.    Neck: Supple: The neck is supple.    Respiratory: Inspection: Breathing unlabored.    Neurologic: Alert and oriented x 3.   Psychiatric: Patient is cooperative and appropriate.  RIGHT KNEE EXAM  APPEARANCE: No gross deformity or malalignment No erythema, swelling or ecchymosis Warm temperature to touch No muscle atrophy of the lower extremity No clubbing, cyanosis, swelling or erythema of the lower extremity  TENDERNESS: +tenderness medial joint line No tenderness lateral joint line +palpable effusion +pes anserine tenderness  ROM: Limited  A/PROM +pain with flexion, extension of the knee No crepitus  SPECIAL TESTS: Patellofemoral compression test is +.  Valgus stress test is negative.  Varus stress test is negative.  Anterior drawer test is negative.  Posterior drawer test is negative.  Patella grinding test is +.  Lachman test is negative.  GAIT: antalgic gait w SC Balance normal with ambulation

## 2023-08-16 ENCOUNTER — APPOINTMENT (OUTPATIENT)
Dept: OPHTHALMOLOGY | Facility: CLINIC | Age: 58
End: 2023-08-16

## 2023-08-25 ENCOUNTER — OUTPATIENT (OUTPATIENT)
Dept: OUTPATIENT SERVICES | Facility: HOSPITAL | Age: 58
LOS: 1 days | End: 2023-08-25
Payer: COMMERCIAL

## 2023-08-25 ENCOUNTER — APPOINTMENT (OUTPATIENT)
Dept: PHYSICAL MEDICINE AND REHAB | Facility: CLINIC | Age: 58
End: 2023-08-25
Payer: MEDICARE

## 2023-08-25 DIAGNOSIS — Z98.890 OTHER SPECIFIED POSTPROCEDURAL STATES: Chronic | ICD-10-CM

## 2023-08-25 DIAGNOSIS — M54.16 RADICULOPATHY, LUMBAR REGION: ICD-10-CM

## 2023-08-25 DIAGNOSIS — N28.89 OTHER SPECIFIED DISORDERS OF KIDNEY AND URETER: Chronic | ICD-10-CM

## 2023-08-25 PROCEDURE — 64454 NJX AA&/STRD GNCLR NRV BRNCH: CPT

## 2023-08-25 PROCEDURE — 77002 NEEDLE LOCALIZATION BY XRAY: CPT

## 2023-08-25 NOTE — PROCEDURE
[de-identified] : [facility]  [facility] [address], [place], [address] - [phone]  PATIENT: RAFAT KAGN  MEDICAL RECORD: [id number]  DATE OF PROCEDURE: 08/25/2023    RIGHT KNEE GENICULAR NERVE BLOCK WITH FLUOROSCOPIC GUIDANCE  Injectate: 0.25% Bupivacaine 1mL and 2mL Normal Saline Preservative Free  Complications: None  Estimated Blood Loss: None  Technique: After informed consent was obtained, the patient was taken to the operating room and placed on the table in the supine position. All pressure points were supported and this procedure was done under local anesthesia. The patient's right knee was then flexed in a 90-degree position and prepped with chlorhexidine and draped in standard sterile manner. The entirety of the procedure was done under sterile technique using sterile gloves, surgical mask and cap and all medication expiration dates were verified prior to being drawn into syringes.  Utilizing AP fluoroscopy the right tibiofemoral joint was visualized. Target sites were superiolateral and medial femoral condyles as well as the medial aspect of the proximal tibial shaft where it meets the condyle. Skin wheals were created at each of the target site using a 25G 1-1/2 inch needle and 3 mL of preservative free 1% lidocaine. Following this, x3 3-1/2-inch 25G spinal needles  were inserted until bony contact at the lateral and medial aspects of the femoral shaft where the femoral shaft and the lateral condyle meet as well as at the medial aspect of the junction of the tibial shaft and condyle. All three needles were positioned to a depth of ~50% of the femoral/tibial shafts under lateral fluoroscopy. After negative aspiration for heme 1 mL of the above mentioned injectate was easily injected to each of these 3 locations.   This procedure was then repeated on the contralateral side in an identical fashion without complications.    The patient tolerated the procedure well and was taken to phase II recovery in stable condition with bilateral lower extremity motor and sensory intact.

## 2023-08-29 DIAGNOSIS — M25.561 PAIN IN RIGHT KNEE: ICD-10-CM

## 2023-09-05 ENCOUNTER — APPOINTMENT (OUTPATIENT)
Dept: PHYSICAL MEDICINE AND REHAB | Facility: CLINIC | Age: 58
End: 2023-09-05
Payer: MEDICARE

## 2023-09-05 VITALS — OXYGEN SATURATION: 96 % | DIASTOLIC BLOOD PRESSURE: 77 MMHG | SYSTOLIC BLOOD PRESSURE: 119 MMHG | HEART RATE: 83 BPM

## 2023-09-05 DIAGNOSIS — K50.90 CROHN'S DISEASE, UNSPECIFIED, W/OUT COMPLICATIONS: ICD-10-CM

## 2023-09-05 PROCEDURE — 99214 OFFICE O/P EST MOD 30 MIN: CPT

## 2023-09-05 RX ORDER — FAMOTIDINE 20 MG/1
20 TABLET, FILM COATED ORAL
Qty: 60 | Refills: 1 | Status: ACTIVE | COMMUNITY
Start: 2023-09-05 | End: 1900-01-01

## 2023-09-05 NOTE — ASSESSMENT
[FreeTextEntry1] : Mr. RAFAT KANG is a 58 year M with pain in the right hip due to hip osteoarthritis relieved with steroid injections. Now with right knee pain due to acute on chronic exacerbation from meniscal injury and repair and DJD. He responded well to genicular nerve blocks. Now had right hip pain exacerbations.  Patient reassured and educated on the diagnosis and treatment options. Risks and benefits of treatment and of delaying treatment discussed with patient. Risks discussed include but not limited to: progression of symptoms, worsening pain and functional status, etc.  This note was generated using Dragon medical dictation software. A reasonable effort had been made for proofreading its contents, but spelling mistakes or grammatical errors may still remain. If there are any questions or points of clarification needed please notify my office.  Advised to take Celebrex cautiously Sending RX for  Famotidine to take with Motrin PRN  Patient had tried and failed conservative treatment. This includes but is not limited to: Acetaminophen, NSAIDs, muscle relaxants, neuropathic medications, physician directed home exercises and/or physical therapy for at least 8 weeks. After a thorough discussion of risks and benefits patient would like to proceed with RIGHT HIP INTRAARTICULAR STEROID INJECTION WITH FLUOROSCOPIC GUIDANCE.  Risks and benefits of having injection discussed with patient. Risks discussed included, but not limited to: pain, infection, bleeding requiring emergency surgery, headaches, damage to vital organs, etc.  At this time, patient appears to be psychologically stable. Based on the history, physical exam and diagnostic findings, patient will benefit from this procedure. The goal of this procedure is to reduce pain and inflammation, improve function and range of motion and to further promote increased activity and return to previous level of functioning. The ultimate goal of performing this procedure is to improve patient's quality of life.  Asking for authorization for RIGHT HIP INTRAARTICULAR STEROID INJECTION WITH FLUOROSCOPIC GUIDANCE. to help treat patients pain. Patient will be scheduled for this procedure.  Will make lucas with ortho for right THR  I have personally spent a total of at least 35 minutes preparing, reviewing internal and external records, explaining, counseling, and coordinating care for this patient encounter.  Patient was advised if the following symptoms develop: chills, fever, loss of bladder control, bowel incontinence or urinary retention, numbness/tingling or weakness is present in upper or lower extremities, to go to the nearest emergency room. This may be a new clinical condition not present at the time of the patient visit that may lead to paralysis and/or death. Patient advised if the above symptoms developed to also call the office immediately to inform us and to go to the nearest emergency room.

## 2023-09-05 NOTE — HISTORY OF PRESENT ILLNESS
[FreeTextEntry1] : RAFAT KANG had RIGHT KNEE GENICULAR NERVE BLOCK WITH FLUOROSCOPIC GUIDANCE on 8/25/23. Today patient is here for follow up. Patient reports greater than 80% reduction in pain as tested by the NRS pain scale. Patient also reports improvement in quality of life. In addition, patient reports improvement of function and ADLs along with a temporary decrease in pain medication use.  However his RIGHT HIP pain has returned and his QoL is affected negatively because of this. He is considering getting a RIGHT THR towards the end of this year. RAFAT KANG would like to have another hip injection in the next month or so. Pain is 10/10. His last injection was on 6/16/23.  He also notes that celebrex heps a lot but causes GERD like symptoms. He has been taking Motrin.  Denies any new pain, numbness or weakness, bowel/bladder dysfunction, saddle anesthesia, fevers, chills, weight loss, night pain, or night sweats at this time.

## 2023-09-05 NOTE — PHYSICAL EXAM
[FreeTextEntry1] : General exam   Constitutional: The patient appears well-developed, well-nourished, and in no apparent distress. Patient is well-groomed.    Skin: The skin is warm and dry, with normal turgor.  Eyes: PERRL.    ENMT: Ears: Hearing is grossly within normal limits.    Neck: Supple: The neck is supple.    Respiratory: Inspection: Breathing unlabored.    Neurologic: Alert and oriented x 3.   Psychiatric: Patient is cooperative and appropriate.  Mood and affect are normal.  Patient's insight is good, and memory and judgment are intact.  Right knee Skin c/d/i without any erythema, swelling, effusion or tenderness  Right hip +FABERE/FADIR Antalgic gait w SC

## 2023-09-28 ENCOUNTER — APPOINTMENT (OUTPATIENT)
Dept: PULMONOLOGY | Facility: CLINIC | Age: 58
End: 2023-09-28
Payer: MEDICARE

## 2023-09-28 VITALS
HEART RATE: 82 BPM | OXYGEN SATURATION: 95 % | RESPIRATION RATE: 15 BRPM | DIASTOLIC BLOOD PRESSURE: 84 MMHG | HEIGHT: 69 IN | SYSTOLIC BLOOD PRESSURE: 137 MMHG | BODY MASS INDEX: 36.63 KG/M2 | WEIGHT: 247.31 LBS | TEMPERATURE: 98.1 F

## 2023-09-28 DIAGNOSIS — R06.09 OTHER FORMS OF DYSPNEA: ICD-10-CM

## 2023-09-28 DIAGNOSIS — Z12.2 ENCOUNTER FOR SCREENING FOR MALIGNANT NEOPLASM OF RESPIRATORY ORGANS: ICD-10-CM

## 2023-09-28 PROCEDURE — G0296 VISIT TO DETERM LDCT ELIG: CPT

## 2023-09-28 PROCEDURE — 99215 OFFICE O/P EST HI 40 MIN: CPT | Mod: 25

## 2023-10-13 ENCOUNTER — APPOINTMENT (OUTPATIENT)
Dept: ORTHOPEDIC SURGERY | Facility: CLINIC | Age: 58
End: 2023-10-13
Payer: MEDICARE

## 2023-10-13 PROCEDURE — 73502 X-RAY EXAM HIP UNI 2-3 VIEWS: CPT

## 2023-10-13 PROCEDURE — 99214 OFFICE O/P EST MOD 30 MIN: CPT | Mod: 25

## 2023-11-06 ENCOUNTER — OUTPATIENT (OUTPATIENT)
Dept: OUTPATIENT SERVICES | Facility: HOSPITAL | Age: 58
LOS: 1 days | End: 2023-11-06
Payer: COMMERCIAL

## 2023-11-06 ENCOUNTER — APPOINTMENT (OUTPATIENT)
Dept: PHYSICAL MEDICINE AND REHAB | Facility: CLINIC | Age: 58
End: 2023-11-06
Payer: MEDICARE

## 2023-11-06 DIAGNOSIS — Z98.890 OTHER SPECIFIED POSTPROCEDURAL STATES: Chronic | ICD-10-CM

## 2023-11-06 DIAGNOSIS — M16.11 UNILATERAL PRIMARY OSTEOARTHRITIS, RIGHT HIP: ICD-10-CM

## 2023-11-06 DIAGNOSIS — N28.89 OTHER SPECIFIED DISORDERS OF KIDNEY AND URETER: Chronic | ICD-10-CM

## 2023-11-06 PROCEDURE — 20610 DRAIN/INJ JOINT/BURSA W/O US: CPT

## 2023-11-06 PROCEDURE — 77002 NEEDLE LOCALIZATION BY XRAY: CPT | Mod: 26

## 2023-11-06 PROCEDURE — 77002 NEEDLE LOCALIZATION BY XRAY: CPT

## 2023-11-06 PROCEDURE — 20610 DRAIN/INJ JOINT/BURSA W/O US: CPT | Mod: RT

## 2023-11-20 ENCOUNTER — OUTPATIENT (OUTPATIENT)
Dept: OUTPATIENT SERVICES | Facility: HOSPITAL | Age: 58
LOS: 1 days | End: 2023-11-20
Payer: COMMERCIAL

## 2023-11-20 ENCOUNTER — APPOINTMENT (OUTPATIENT)
Dept: SLEEP CENTER | Facility: CLINIC | Age: 58
End: 2023-11-20
Payer: MEDICARE

## 2023-11-20 DIAGNOSIS — Z98.890 OTHER SPECIFIED POSTPROCEDURAL STATES: Chronic | ICD-10-CM

## 2023-11-20 DIAGNOSIS — N28.89 OTHER SPECIFIED DISORDERS OF KIDNEY AND URETER: Chronic | ICD-10-CM

## 2023-11-20 PROCEDURE — 95811 POLYSOM 6/>YRS CPAP 4/> PARM: CPT | Mod: 26

## 2023-11-20 PROCEDURE — 95811 POLYSOM 6/>YRS CPAP 4/> PARM: CPT

## 2023-11-21 ENCOUNTER — APPOINTMENT (OUTPATIENT)
Dept: PULMONOLOGY | Facility: CLINIC | Age: 58
End: 2023-11-21
Payer: MEDICARE

## 2023-11-21 VITALS
DIASTOLIC BLOOD PRESSURE: 77 MMHG | SYSTOLIC BLOOD PRESSURE: 133 MMHG | TEMPERATURE: 97.8 F | OXYGEN SATURATION: 96 % | HEIGHT: 69 IN | RESPIRATION RATE: 16 BRPM | BODY MASS INDEX: 37.18 KG/M2 | WEIGHT: 251 LBS | HEART RATE: 78 BPM

## 2023-11-21 DIAGNOSIS — Z72.0 TOBACCO USE: ICD-10-CM

## 2023-11-21 DIAGNOSIS — R05.9 COUGH, UNSPECIFIED: ICD-10-CM

## 2023-11-21 DIAGNOSIS — R06.83 SNORING: ICD-10-CM

## 2023-11-21 PROCEDURE — 99214 OFFICE O/P EST MOD 30 MIN: CPT

## 2023-11-21 RX ORDER — FLUTICASONE PROPIONATE 50 UG/1
50 SPRAY, METERED NASAL
Qty: 1 | Refills: 3 | Status: ACTIVE | COMMUNITY
Start: 2022-11-08 | End: 1900-01-01

## 2023-11-21 RX ORDER — ALBUTEROL SULFATE 90 UG/1
108 (90 BASE) INHALANT RESPIRATORY (INHALATION)
Qty: 1 | Refills: 4 | Status: ACTIVE | COMMUNITY
Start: 2023-11-21 | End: 1900-01-01

## 2023-11-21 RX ORDER — UMECLIDINIUM BROMIDE AND VILANTEROL TRIFENATATE 62.5; 25 UG/1; UG/1
62.5-25 POWDER RESPIRATORY (INHALATION) DAILY
Qty: 1 | Refills: 5 | Status: ACTIVE | COMMUNITY
Start: 2023-03-04 | End: 1900-01-01

## 2023-11-22 ENCOUNTER — APPOINTMENT (OUTPATIENT)
Dept: SLEEP CENTER | Facility: CLINIC | Age: 58
End: 2023-11-22

## 2023-11-27 ENCOUNTER — APPOINTMENT (OUTPATIENT)
Dept: PHYSICAL MEDICINE AND REHAB | Facility: CLINIC | Age: 58
End: 2023-11-27
Payer: MEDICARE

## 2023-11-27 VITALS — OXYGEN SATURATION: 96 % | HEART RATE: 88 BPM | DIASTOLIC BLOOD PRESSURE: 79 MMHG | SYSTOLIC BLOOD PRESSURE: 138 MMHG

## 2023-11-27 DIAGNOSIS — G47.33 OBSTRUCTIVE SLEEP APNEA (ADULT) (PEDIATRIC): ICD-10-CM

## 2023-11-27 PROCEDURE — 99214 OFFICE O/P EST MOD 30 MIN: CPT

## 2023-12-05 NOTE — ED PROVIDER NOTE - CPE EDP RESP NORM
29-Nov-2023 20:58 01-Dec-2023 11:35 29-Nov-2023 18:58 05-Dec-2023 09:41 04-Dec-2023 13:02 03-Dec-2023 14:29 normal...

## 2024-01-02 ENCOUNTER — APPOINTMENT (OUTPATIENT)
Dept: PHYSICAL MEDICINE AND REHAB | Facility: CLINIC | Age: 59
End: 2024-01-02

## 2024-01-05 ENCOUNTER — OUTPATIENT (OUTPATIENT)
Dept: OUTPATIENT SERVICES | Facility: HOSPITAL | Age: 59
LOS: 1 days | End: 2024-01-05
Payer: COMMERCIAL

## 2024-01-05 ENCOUNTER — APPOINTMENT (OUTPATIENT)
Dept: PHYSICAL MEDICINE AND REHAB | Facility: CLINIC | Age: 59
End: 2024-01-05
Payer: MEDICARE

## 2024-01-05 DIAGNOSIS — M23.92 UNSPECIFIED INTERNAL DERANGEMENT OF LEFT KNEE: ICD-10-CM

## 2024-01-05 DIAGNOSIS — N28.89 OTHER SPECIFIED DISORDERS OF KIDNEY AND URETER: Chronic | ICD-10-CM

## 2024-01-05 DIAGNOSIS — Z98.890 OTHER SPECIFIED POSTPROCEDURAL STATES: Chronic | ICD-10-CM

## 2024-01-05 DIAGNOSIS — Z98.890 OTHER SPECIFIED POSTPROCEDURAL STATES: ICD-10-CM

## 2024-01-05 DIAGNOSIS — M25.561 PAIN IN RIGHT KNEE: ICD-10-CM

## 2024-01-05 PROCEDURE — 77002 NEEDLE LOCALIZATION BY XRAY: CPT

## 2024-01-05 PROCEDURE — 64454 NJX AA&/STRD GNCLR NRV BRNCH: CPT

## 2024-01-05 NOTE — PROCEDURE
[de-identified] : [facility]  [facility] [address], [place], [address] - [phone]  PATIENT: RAFAT KANG  MEDICAL RECORD: [id number]  DATE OF PROCEDURE: 01/05/2024   RIGHT KNEE GENICULAR NERVE BLOCK WITH FLUOROSCOPIC GUIDANCE  Injectate: 0.25% Bupivacaine 1mL and 2mL Normal Saline Preservative Free  Complications: None  Estimated Blood Loss: None  Technique: After informed consent was obtained, the patient was taken to the operating room and placed on the table in the supine position. All pressure points were supported and this procedure was done under local anesthesia. The patient's right knee was then flexed in a 90-degree position and prepped with chlorhexidine and draped in standard sterile manner. The entirety of the procedure was done under sterile technique using sterile gloves, surgical mask and cap and all medication expiration dates were verified prior to being drawn into syringes.  Utilizing AP fluoroscopy the right tibiofemoral joint was visualized. Target sites were superiolateral and medial femoral condyles as well as the medial aspect of the proximal tibial shaft where it meets the condyle. Skin wheals were created at each of the target site using a 25G 1-1/2 inch needle and 3 mL of preservative free 1% lidocaine. Following this, x3 3-1/2-inch 25G spinal needles  were inserted until bony contact at the lateral and medial aspects of the femoral shaft where the femoral shaft and the lateral condyle meet as well as at the medial aspect of the junction of the tibial shaft and condyle. All three needles were positioned to a depth of ~50% of the femoral/tibial shafts under lateral fluoroscopy. After negative aspiration for heme 1 mL of the above mentioned injectate was easily injected to each of these 3 locations.   This procedure was then repeated on the contralateral side in an identical fashion without complications.    The patient tolerated the procedure well and was taken to phase II recovery in stable condition with bilateral lower extremity motor and sensory intact.

## 2024-01-13 ENCOUNTER — EMERGENCY (EMERGENCY)
Facility: HOSPITAL | Age: 59
LOS: 1 days | Discharge: HOME | End: 2024-01-13
Attending: STUDENT IN AN ORGANIZED HEALTH CARE EDUCATION/TRAINING PROGRAM | Admitting: STUDENT IN AN ORGANIZED HEALTH CARE EDUCATION/TRAINING PROGRAM
Payer: MEDICARE

## 2024-01-13 VITALS
OXYGEN SATURATION: 100 % | HEART RATE: 87 BPM | RESPIRATION RATE: 20 BRPM | SYSTOLIC BLOOD PRESSURE: 136 MMHG | DIASTOLIC BLOOD PRESSURE: 81 MMHG | TEMPERATURE: 98 F

## 2024-01-13 DIAGNOSIS — N28.89 OTHER SPECIFIED DISORDERS OF KIDNEY AND URETER: Chronic | ICD-10-CM

## 2024-01-13 DIAGNOSIS — Z98.890 OTHER SPECIFIED POSTPROCEDURAL STATES: Chronic | ICD-10-CM

## 2024-01-13 PROCEDURE — 99285 EMERGENCY DEPT VISIT HI MDM: CPT | Mod: 25

## 2024-01-14 VITALS
RESPIRATION RATE: 18 BRPM | SYSTOLIC BLOOD PRESSURE: 143 MMHG | TEMPERATURE: 98 F | DIASTOLIC BLOOD PRESSURE: 74 MMHG | HEART RATE: 67 BPM | OXYGEN SATURATION: 100 %

## 2024-01-14 LAB
ALBUMIN SERPL ELPH-MCNC: 3.8 G/DL — SIGNIFICANT CHANGE UP (ref 3.3–5)
ALBUMIN SERPL ELPH-MCNC: 3.8 G/DL — SIGNIFICANT CHANGE UP (ref 3.3–5)
ALP SERPL-CCNC: 101 U/L — SIGNIFICANT CHANGE UP (ref 40–120)
ALP SERPL-CCNC: 101 U/L — SIGNIFICANT CHANGE UP (ref 40–120)
ALT FLD-CCNC: 19 U/L — SIGNIFICANT CHANGE UP (ref 4–41)
ALT FLD-CCNC: 19 U/L — SIGNIFICANT CHANGE UP (ref 4–41)
ANION GAP SERPL CALC-SCNC: 15 MMOL/L — HIGH (ref 7–14)
ANION GAP SERPL CALC-SCNC: 15 MMOL/L — HIGH (ref 7–14)
APPEARANCE UR: ABNORMAL
APPEARANCE UR: ABNORMAL
AST SERPL-CCNC: 24 U/L — SIGNIFICANT CHANGE UP (ref 4–40)
AST SERPL-CCNC: 24 U/L — SIGNIFICANT CHANGE UP (ref 4–40)
BACTERIA # UR AUTO: ABNORMAL /HPF
BACTERIA # UR AUTO: ABNORMAL /HPF
BASE EXCESS BLDV CALC-SCNC: 2.8 MMOL/L — SIGNIFICANT CHANGE UP (ref -2–3)
BASE EXCESS BLDV CALC-SCNC: 2.8 MMOL/L — SIGNIFICANT CHANGE UP (ref -2–3)
BASOPHILS # BLD AUTO: 0.03 K/UL — SIGNIFICANT CHANGE UP (ref 0–0.2)
BASOPHILS # BLD AUTO: 0.03 K/UL — SIGNIFICANT CHANGE UP (ref 0–0.2)
BASOPHILS NFR BLD AUTO: 0.3 % — SIGNIFICANT CHANGE UP (ref 0–2)
BASOPHILS NFR BLD AUTO: 0.3 % — SIGNIFICANT CHANGE UP (ref 0–2)
BILIRUB SERPL-MCNC: 0.5 MG/DL — SIGNIFICANT CHANGE UP (ref 0.2–1.2)
BILIRUB SERPL-MCNC: 0.5 MG/DL — SIGNIFICANT CHANGE UP (ref 0.2–1.2)
BILIRUB UR-MCNC: NEGATIVE — SIGNIFICANT CHANGE UP
BILIRUB UR-MCNC: NEGATIVE — SIGNIFICANT CHANGE UP
BLOOD GAS VENOUS COMPREHENSIVE RESULT: SIGNIFICANT CHANGE UP
BLOOD GAS VENOUS COMPREHENSIVE RESULT: SIGNIFICANT CHANGE UP
BUN SERPL-MCNC: 24 MG/DL — HIGH (ref 7–23)
BUN SERPL-MCNC: 24 MG/DL — HIGH (ref 7–23)
CALCIUM SERPL-MCNC: 9.5 MG/DL — SIGNIFICANT CHANGE UP (ref 8.4–10.5)
CALCIUM SERPL-MCNC: 9.5 MG/DL — SIGNIFICANT CHANGE UP (ref 8.4–10.5)
CAST: 14 /LPF — HIGH (ref 0–4)
CAST: 14 /LPF — HIGH (ref 0–4)
CHLORIDE BLDV-SCNC: 104 MMOL/L — SIGNIFICANT CHANGE UP (ref 96–108)
CHLORIDE BLDV-SCNC: 104 MMOL/L — SIGNIFICANT CHANGE UP (ref 96–108)
CHLORIDE SERPL-SCNC: 104 MMOL/L — SIGNIFICANT CHANGE UP (ref 98–107)
CHLORIDE SERPL-SCNC: 104 MMOL/L — SIGNIFICANT CHANGE UP (ref 98–107)
CO2 BLDV-SCNC: 31.5 MMOL/L — HIGH (ref 22–26)
CO2 BLDV-SCNC: 31.5 MMOL/L — HIGH (ref 22–26)
CO2 SERPL-SCNC: 22 MMOL/L — SIGNIFICANT CHANGE UP (ref 22–31)
CO2 SERPL-SCNC: 22 MMOL/L — SIGNIFICANT CHANGE UP (ref 22–31)
COLOR SPEC: SIGNIFICANT CHANGE UP
COLOR SPEC: SIGNIFICANT CHANGE UP
CREAT SERPL-MCNC: 1.88 MG/DL — HIGH (ref 0.5–1.3)
CREAT SERPL-MCNC: 1.88 MG/DL — HIGH (ref 0.5–1.3)
DIFF PNL FLD: ABNORMAL
DIFF PNL FLD: ABNORMAL
EGFR: 41 ML/MIN/1.73M2 — LOW
EGFR: 41 ML/MIN/1.73M2 — LOW
EOSINOPHIL # BLD AUTO: 0.12 K/UL — SIGNIFICANT CHANGE UP (ref 0–0.5)
EOSINOPHIL # BLD AUTO: 0.12 K/UL — SIGNIFICANT CHANGE UP (ref 0–0.5)
EOSINOPHIL NFR BLD AUTO: 1 % — SIGNIFICANT CHANGE UP (ref 0–6)
EOSINOPHIL NFR BLD AUTO: 1 % — SIGNIFICANT CHANGE UP (ref 0–6)
GAS PNL BLDV: 136 MMOL/L — SIGNIFICANT CHANGE UP (ref 136–145)
GAS PNL BLDV: 136 MMOL/L — SIGNIFICANT CHANGE UP (ref 136–145)
GAS PNL BLDV: SIGNIFICANT CHANGE UP
GAS PNL BLDV: SIGNIFICANT CHANGE UP
GLUCOSE BLDV-MCNC: 129 MG/DL — HIGH (ref 70–99)
GLUCOSE BLDV-MCNC: 129 MG/DL — HIGH (ref 70–99)
GLUCOSE SERPL-MCNC: 128 MG/DL — HIGH (ref 70–99)
GLUCOSE SERPL-MCNC: 128 MG/DL — HIGH (ref 70–99)
GLUCOSE UR QL: NEGATIVE MG/DL — SIGNIFICANT CHANGE UP
GLUCOSE UR QL: NEGATIVE MG/DL — SIGNIFICANT CHANGE UP
HCO3 BLDV-SCNC: 30 MMOL/L — HIGH (ref 22–29)
HCO3 BLDV-SCNC: 30 MMOL/L — HIGH (ref 22–29)
HCT VFR BLD CALC: 43.7 % — SIGNIFICANT CHANGE UP (ref 39–50)
HCT VFR BLD CALC: 43.7 % — SIGNIFICANT CHANGE UP (ref 39–50)
HCT VFR BLDA CALC: 44 % — SIGNIFICANT CHANGE UP (ref 39–51)
HCT VFR BLDA CALC: 44 % — SIGNIFICANT CHANGE UP (ref 39–51)
HGB BLD CALC-MCNC: 14.8 G/DL — SIGNIFICANT CHANGE UP (ref 12.6–17.4)
HGB BLD CALC-MCNC: 14.8 G/DL — SIGNIFICANT CHANGE UP (ref 12.6–17.4)
HGB BLD-MCNC: 14.5 G/DL — SIGNIFICANT CHANGE UP (ref 13–17)
HGB BLD-MCNC: 14.5 G/DL — SIGNIFICANT CHANGE UP (ref 13–17)
HYALINE CASTS # UR AUTO: PRESENT
HYALINE CASTS # UR AUTO: PRESENT
IANC: 7.62 K/UL — HIGH (ref 1.8–7.4)
IANC: 7.62 K/UL — HIGH (ref 1.8–7.4)
IMM GRANULOCYTES NFR BLD AUTO: 0.5 % — SIGNIFICANT CHANGE UP (ref 0–0.9)
IMM GRANULOCYTES NFR BLD AUTO: 0.5 % — SIGNIFICANT CHANGE UP (ref 0–0.9)
KETONES UR-MCNC: ABNORMAL MG/DL
KETONES UR-MCNC: ABNORMAL MG/DL
LACTATE BLDV-MCNC: 1.6 MMOL/L — SIGNIFICANT CHANGE UP (ref 0.5–2)
LACTATE BLDV-MCNC: 1.6 MMOL/L — SIGNIFICANT CHANGE UP (ref 0.5–2)
LEUKOCYTE ESTERASE UR-ACNC: ABNORMAL
LEUKOCYTE ESTERASE UR-ACNC: ABNORMAL
LIDOCAIN IGE QN: 46 U/L — SIGNIFICANT CHANGE UP (ref 7–60)
LIDOCAIN IGE QN: 46 U/L — SIGNIFICANT CHANGE UP (ref 7–60)
LYMPHOCYTES # BLD AUTO: 2.82 K/UL — SIGNIFICANT CHANGE UP (ref 1–3.3)
LYMPHOCYTES # BLD AUTO: 2.82 K/UL — SIGNIFICANT CHANGE UP (ref 1–3.3)
LYMPHOCYTES # BLD AUTO: 23.6 % — SIGNIFICANT CHANGE UP (ref 13–44)
LYMPHOCYTES # BLD AUTO: 23.6 % — SIGNIFICANT CHANGE UP (ref 13–44)
MCHC RBC-ENTMCNC: 30.5 PG — SIGNIFICANT CHANGE UP (ref 27–34)
MCHC RBC-ENTMCNC: 30.5 PG — SIGNIFICANT CHANGE UP (ref 27–34)
MCHC RBC-ENTMCNC: 33.2 GM/DL — SIGNIFICANT CHANGE UP (ref 32–36)
MCHC RBC-ENTMCNC: 33.2 GM/DL — SIGNIFICANT CHANGE UP (ref 32–36)
MCV RBC AUTO: 92 FL — SIGNIFICANT CHANGE UP (ref 80–100)
MCV RBC AUTO: 92 FL — SIGNIFICANT CHANGE UP (ref 80–100)
MONOCYTES # BLD AUTO: 1.32 K/UL — HIGH (ref 0–0.9)
MONOCYTES # BLD AUTO: 1.32 K/UL — HIGH (ref 0–0.9)
MONOCYTES NFR BLD AUTO: 11 % — SIGNIFICANT CHANGE UP (ref 2–14)
MONOCYTES NFR BLD AUTO: 11 % — SIGNIFICANT CHANGE UP (ref 2–14)
NEUTROPHILS # BLD AUTO: 7.62 K/UL — HIGH (ref 1.8–7.4)
NEUTROPHILS # BLD AUTO: 7.62 K/UL — HIGH (ref 1.8–7.4)
NEUTROPHILS NFR BLD AUTO: 63.6 % — SIGNIFICANT CHANGE UP (ref 43–77)
NEUTROPHILS NFR BLD AUTO: 63.6 % — SIGNIFICANT CHANGE UP (ref 43–77)
NITRITE UR-MCNC: NEGATIVE — SIGNIFICANT CHANGE UP
NITRITE UR-MCNC: NEGATIVE — SIGNIFICANT CHANGE UP
NRBC # BLD: 0 /100 WBCS — SIGNIFICANT CHANGE UP (ref 0–0)
NRBC # BLD: 0 /100 WBCS — SIGNIFICANT CHANGE UP (ref 0–0)
NRBC # FLD: 0 K/UL — SIGNIFICANT CHANGE UP (ref 0–0)
NRBC # FLD: 0 K/UL — SIGNIFICANT CHANGE UP (ref 0–0)
PCO2 BLDV: 54 MMHG — SIGNIFICANT CHANGE UP (ref 42–55)
PCO2 BLDV: 54 MMHG — SIGNIFICANT CHANGE UP (ref 42–55)
PH BLDV: 7.35 — SIGNIFICANT CHANGE UP (ref 7.32–7.43)
PH BLDV: 7.35 — SIGNIFICANT CHANGE UP (ref 7.32–7.43)
PH UR: 5.5 — SIGNIFICANT CHANGE UP (ref 5–8)
PH UR: 5.5 — SIGNIFICANT CHANGE UP (ref 5–8)
PLATELET # BLD AUTO: 165 K/UL — SIGNIFICANT CHANGE UP (ref 150–400)
PLATELET # BLD AUTO: 165 K/UL — SIGNIFICANT CHANGE UP (ref 150–400)
PO2 BLDV: 31 MMHG — SIGNIFICANT CHANGE UP (ref 25–45)
PO2 BLDV: 31 MMHG — SIGNIFICANT CHANGE UP (ref 25–45)
POTASSIUM BLDV-SCNC: 5.6 MMOL/L — HIGH (ref 3.5–5.1)
POTASSIUM BLDV-SCNC: 5.6 MMOL/L — HIGH (ref 3.5–5.1)
POTASSIUM SERPL-MCNC: 4.4 MMOL/L — SIGNIFICANT CHANGE UP (ref 3.5–5.3)
POTASSIUM SERPL-MCNC: 4.4 MMOL/L — SIGNIFICANT CHANGE UP (ref 3.5–5.3)
POTASSIUM SERPL-SCNC: 4.4 MMOL/L — SIGNIFICANT CHANGE UP (ref 3.5–5.3)
POTASSIUM SERPL-SCNC: 4.4 MMOL/L — SIGNIFICANT CHANGE UP (ref 3.5–5.3)
PROT SERPL-MCNC: 7.6 G/DL — SIGNIFICANT CHANGE UP (ref 6–8.3)
PROT SERPL-MCNC: 7.6 G/DL — SIGNIFICANT CHANGE UP (ref 6–8.3)
PROT UR-MCNC: 100 MG/DL
PROT UR-MCNC: 100 MG/DL
RBC # BLD: 4.75 M/UL — SIGNIFICANT CHANGE UP (ref 4.2–5.8)
RBC # BLD: 4.75 M/UL — SIGNIFICANT CHANGE UP (ref 4.2–5.8)
RBC # FLD: 12.8 % — SIGNIFICANT CHANGE UP (ref 10.3–14.5)
RBC # FLD: 12.8 % — SIGNIFICANT CHANGE UP (ref 10.3–14.5)
RBC CASTS # UR COMP ASSIST: 264 /HPF — HIGH (ref 0–4)
RBC CASTS # UR COMP ASSIST: 264 /HPF — HIGH (ref 0–4)
REVIEW: SIGNIFICANT CHANGE UP
REVIEW: SIGNIFICANT CHANGE UP
SAO2 % BLDV: 47.9 % — LOW (ref 67–88)
SAO2 % BLDV: 47.9 % — LOW (ref 67–88)
SODIUM SERPL-SCNC: 141 MMOL/L — SIGNIFICANT CHANGE UP (ref 135–145)
SODIUM SERPL-SCNC: 141 MMOL/L — SIGNIFICANT CHANGE UP (ref 135–145)
SP GR SPEC: 1.02 — SIGNIFICANT CHANGE UP (ref 1–1.03)
SP GR SPEC: 1.02 — SIGNIFICANT CHANGE UP (ref 1–1.03)
SQUAMOUS # UR AUTO: 2 /HPF — SIGNIFICANT CHANGE UP (ref 0–5)
SQUAMOUS # UR AUTO: 2 /HPF — SIGNIFICANT CHANGE UP (ref 0–5)
TRANS CELLS #/AREA URNS HPF: PRESENT
TRANS CELLS #/AREA URNS HPF: PRESENT
UROBILINOGEN FLD QL: 1 MG/DL — SIGNIFICANT CHANGE UP (ref 0.2–1)
UROBILINOGEN FLD QL: 1 MG/DL — SIGNIFICANT CHANGE UP (ref 0.2–1)
WBC # BLD: 11.97 K/UL — HIGH (ref 3.8–10.5)
WBC # BLD: 11.97 K/UL — HIGH (ref 3.8–10.5)
WBC # FLD AUTO: 11.97 K/UL — HIGH (ref 3.8–10.5)
WBC # FLD AUTO: 11.97 K/UL — HIGH (ref 3.8–10.5)
WBC UR QL: 315 /HPF — HIGH (ref 0–5)
WBC UR QL: 315 /HPF — HIGH (ref 0–5)
YEAST-LIKE CELLS: PRESENT
YEAST-LIKE CELLS: PRESENT

## 2024-01-14 PROCEDURE — 99284 EMERGENCY DEPT VISIT MOD MDM: CPT | Mod: GC

## 2024-01-14 PROCEDURE — 74176 CT ABD & PELVIS W/O CONTRAST: CPT | Mod: 26,GC,MA

## 2024-01-14 RX ORDER — TAMSULOSIN HYDROCHLORIDE 0.4 MG/1
1 CAPSULE ORAL
Qty: 7 | Refills: 0
Start: 2024-01-14 | End: 2024-01-20

## 2024-01-14 RX ORDER — SODIUM CHLORIDE 9 MG/ML
1000 INJECTION INTRAMUSCULAR; INTRAVENOUS; SUBCUTANEOUS ONCE
Refills: 0 | Status: COMPLETED | OUTPATIENT
Start: 2024-01-14 | End: 2024-01-14

## 2024-01-14 RX ORDER — ACETAMINOPHEN 500 MG
650 TABLET ORAL ONCE
Refills: 0 | Status: COMPLETED | OUTPATIENT
Start: 2024-01-14 | End: 2024-01-14

## 2024-01-14 RX ORDER — HYDROCORTISONE 1 %
1 OINTMENT (GRAM) TOPICAL ONCE
Refills: 0 | Status: DISCONTINUED | OUTPATIENT
Start: 2024-01-14 | End: 2024-01-17

## 2024-01-14 RX ORDER — CEFPODOXIME PROXETIL 100 MG
1 TABLET ORAL
Qty: 20 | Refills: 0
Start: 2024-01-14 | End: 2024-01-23

## 2024-01-14 RX ORDER — KETOROLAC TROMETHAMINE 30 MG/ML
15 SYRINGE (ML) INJECTION ONCE
Refills: 0 | Status: DISCONTINUED | OUTPATIENT
Start: 2024-01-14 | End: 2024-01-14

## 2024-01-14 RX ORDER — IBUPROFEN 200 MG
400 TABLET ORAL ONCE
Refills: 0 | Status: COMPLETED | OUTPATIENT
Start: 2024-01-14 | End: 2024-01-14

## 2024-01-14 RX ORDER — PHENYLEPHRINE-SHARK LIVER OIL-MINERAL OIL-PETROLATUM RECTAL OINTMENT
1 OINTMENT (GRAM) RECTAL ONCE
Refills: 0 | Status: DISCONTINUED | OUTPATIENT
Start: 2024-01-14 | End: 2024-01-14

## 2024-01-14 RX ORDER — CEFTRIAXONE 500 MG/1
1000 INJECTION, POWDER, FOR SOLUTION INTRAMUSCULAR; INTRAVENOUS ONCE
Refills: 0 | Status: COMPLETED | OUTPATIENT
Start: 2024-01-14 | End: 2024-01-14

## 2024-01-14 RX ADMIN — SODIUM CHLORIDE 1000 MILLILITER(S): 9 INJECTION INTRAMUSCULAR; INTRAVENOUS; SUBCUTANEOUS at 04:29

## 2024-01-14 RX ADMIN — Medication 15 MILLIGRAM(S): at 09:00

## 2024-01-14 RX ADMIN — CEFTRIAXONE 100 MILLIGRAM(S): 500 INJECTION, POWDER, FOR SOLUTION INTRAMUSCULAR; INTRAVENOUS at 07:43

## 2024-01-14 RX ADMIN — Medication 400 MILLIGRAM(S): at 09:00

## 2024-01-14 RX ADMIN — Medication 400 MILLIGRAM(S): at 04:29

## 2024-01-14 RX ADMIN — Medication 15 MILLIGRAM(S): at 04:29

## 2024-01-14 RX ADMIN — CEFTRIAXONE 1000 MILLIGRAM(S): 500 INJECTION, POWDER, FOR SOLUTION INTRAMUSCULAR; INTRAVENOUS at 08:59

## 2024-01-14 RX ADMIN — Medication 650 MILLIGRAM(S): at 09:00

## 2024-01-14 RX ADMIN — Medication 650 MILLIGRAM(S): at 04:29

## 2024-01-14 NOTE — CONSULT NOTE ADULT - ASSESSMENT
Pt is a 57 y/o M w/ hx nephrolithiasis who follows Dr. Martines, presenting with four days of L. Flank pain, and found to have a 2.5mm obstructing  distal L. ureteral stone. Pt is AVSS, minimally elevated white count, non-septic appearing and scheduled for ESWL on 3/14/24. Pt most likely to benefit from MET.    Recommendations  - No acute urologic intervention indicated at this time  - 0.4 mg flomax QD  - Bactrim /800mg q12 5day  - f/u urine culture  - f/u outpatient with Dr. Martines     Plan discussed with Dr. Gonzalo Armstrong    Urology  #[id number]   Pt is a 57 y/o M w/ hx nephrolithiasis who follows Dr. Martines, presenting with four days of L. Flank pain, and found to have a 2.5mm obstructing  distal L. ureteral stone. Pt is AVSS, minimally elevated white count, non-septic appearing and scheduled for ESWL on 3/14/24. Pt most likely to benefit from MET.    Recommendations  - No acute urologic intervention indicated at this time  - 0.4 mg flomax QD  - Bactrim /800mg q12 5day  - f/u urine culture  - f/u outpatient with Dr. Martines     Plan discussed with Dr. Gonzalo Armstrong    Urology  #[id number]

## 2024-01-14 NOTE — ED ADULT NURSE NOTE - CAS DISCH CONDITION
SUBJECTIVE:  Patient is a 43 year old female here for physical. Feels well overall. Here asthma is under good control on the singulair and she just uses her albuterol when sick or exercising. Diet is good. She is exercising regularly. Due for labs and is fasting today. PAP and mammograms are UTD. Immunizations UTD. Needs refills on her meds, no side effects.     PAST MEDICAL HISTORY:  Patient  has a past medical history of BRCA negative and Unspecified asthma(493.90).    PAST SURGICAL HISTORY:  Patient  has a past surgical history that includes  delivery+postpartum care; removal of tonsils,<11 y/o; pap smear,routine (6/4/10); appendectomy;  delivery only (2009); tympanostomy; pap smear,thin prep(inc 90938) (11); pap smear,thin prep(inc 05750) (9-3-13); Mammo Screening Bilateral (10-16-12); Sterilization (10-22-13); Thin Prep Pap Test with HPV Reflex (14); Thin Prep Pap Test with HPV Reflex (9-29-15); and Mammo Screening Bilateral (16).    ALLERGIES:   Allergen Reactions   • Amoxicillin RASH   • Cefuroxime Axetil RASH   • Zithromax [Azithromycin Dihydrate] SWELLING and SHORTNESS OF BREATH     MEDICATIONS:   Current Outpatient Medications   Medication   • albuterol (VENTOLIN HFA) 108 (90 Base) MCG/ACT inhaler   • montelukast (SINGULAIR) 10 MG tablet   • Multiple Vitamin (MULTI-VITAMIN DAILY PO)     No current facility-administered medications for this visit.        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  EYES:  Patient denies blurred vision, double vision, pain, burning and itching.   NEUROLOGIC:  Patient denies tremors, dizzy spells, numbness, tingling, vision change, loss of balance.  ENDOCRINE:  Patient denies excessive thirst, heat intolerance, lymphnode enlargement, tired/sluggishness.  GASTROINTESTINAL:  Patient denies abdominal pain, nausea/vomiting, indigestion/heartburn, diarrhea, constipation, but complains of  bloating.  CARDIOVASCULAR:  Patient denies  chest pain, shortness of breath, palpitations  SKIN:  Patient denies skin rashes, boils, persistent itching, acne.  MUSCULOSKELETAL:  Patient denies joint pain, neck pain, back pain, leg swelling.  ENT/MOUTH:  Patient denies ear infections, sore throats, occ sinus problems, hearing loss.  :  Patient denies urine retention, painful urination, urinary frequency, blood in urine, nocturia.  RESPIRATORY:  Patient denies wheezing, frequent cough, shortness of breath.  PHYSICAL EXAMINATION:   GENERAL: awake, alert and oriented and in no acute distress  VITALS: Visit Vitals  /68   Pulse 64   Ht 5' 10\" (1.778 m)   BMI 26.54 kg/m²     HEENT: normocephalic, atraumatic, pupils equal, round and reactive to light and accommodation, tympanic membranes clear, nasal mucosa normal and pharynx normal  NECK: no carotid bruits, no thyromegaly and no JVD  LUNGS:  clear to auscultation, good air entry, no rales or wheezes and no rhonchi  CARDIOVASCULAR:  no JVD, S1 and S2 normal, no S3 or S4, no clicks, rubs or murmurs, PMI nondisplaced, no abdomen bruit, no palpable masses, pulses equal to Dorsalis Pedis and regular rate and rhythm  ABDOMEN:  soft, non-tender,  nondistended, no hepatosplenomegaly, normal active bowel sounds and no masses palpated  EXTREMITIES:  no erythema, induration, no evidence of joint effusion, Range of motion of all joints is normal, no evidence of joint instability, no crepitation detected, normal foot exam, no edema, no varicosities, pulses intact  NEURO:  awake, alert, oriented X3, gait normal, balance normal, coordination normal, no pronator drift, speech fluent, DTRs equal throughout, no Rhomberg sign, cranial nerves intact and strength normal throughout  SKIN: color, texture, turgor are normal, no bruises, rashes or lesions , scattered nevi    ASSESSMENT AND PLAN:  ASSESSMENT: (Z00.00) Routine adult health maintenance  (primary encounter diagnosis)  Comment: cont diet and exercise, labs today. Mammograms, pap, immunizations UTD  Plan:     (J45.20) Mild intermittent asthma without complication  Comment: refilled singulair and albuterol  Plan:        Stable

## 2024-01-14 NOTE — ED PROVIDER NOTE - PATIENT PORTAL LINK FT
You can access the FollowMyHealth Patient Portal offered by [facility] by registering at the following website: [email or web address]. By joining [employer]’s FollowMyHealth portal, you will also be able to view your health information using other applications (apps) compatible with our system. You can access the FollowMyHealth Patient Portal offered by [facility] by registering at the following website: [email or web address]. By joining [employer]’s FollowMyHealth portal, you will also be able to view your health information using other applications (apps) compatible with our system.

## 2024-01-14 NOTE — ED ADULT NURSE REASSESSMENT NOTE - PAIN RATING/NUMBER SCALE (0-10): ACTIVITY
0 (no pain/absence of nonverbal indicators of pain) Wound Care Provider Visit [facility]    Patient seen in Wound Care Clinic by: Mark MORILLO RN assisted by: Tracee AMADOR    Wound care orders and/or updates: continue acticoat flex    Lab(s)/additional orders placed this visit: None  Wound(s) debrided by provider: Yes Consent obtained/verified: Verified  Dermal Applied: No Next Dermal Placement on:     Wound care being completed by: Patient  Supplies provided/ordered: Provided to patient    Patient education complete: Yes  Patient verbalized understanding of education/patient questions answered: Yes    Follow up after see Dr Stover.    Pt aware to see Dr Marcano (information provided on AVS)

## 2024-01-14 NOTE — ED PROVIDER NOTE - PROGRESS NOTE DETAILS
Pt signed out to me. Vitals wnl. CT showed 2.5 mm stone. Urology will see pt. As per urology pt is safe for discharge. Recs: DC on .4 flomax, drink fluids and follow up with his urologist Dr. Conrad Poole. Kirsten GRAJEDA: Patient signed out pending urology recommendations. Patient was seen by urology and cleared for discharged. Per urology, urine likely contaminated. However given he has a stone, plan to d/c with Abx, flomax. I saw and reassessed patient. He is comfortable without any pain. Return precautions discussed in detail. Return for fever, dysuria, vomiting, worsening pain. Patient advised to avoid NSAIDS and take tylenol for pain. Patient states he will follow up with his urology, Dr. Delgado.

## 2024-01-14 NOTE — ED ADULT NURSE REASSESSMENT NOTE - NS ED NURSE REASSESS COMMENT FT1
EVELYN RN. Patient A&Ox4, resting in stretcher, respirations even and unlabored. Vitals stable. Patient states improvement in condition. Awaiting dispo. Stretcher in lowest position, wheels locked, appropriate side rails in place, call bell in reach.

## 2024-01-14 NOTE — CONSULT NOTE ADULT - SUBJECTIVE AND OBJECTIVE BOX
HPI  Pt is a 58 male with hx of htn, t2dm, HDL, left kidney cancer s/p partial resection (), nephrolithiasis (s/p ESWL) who follows Danny Beauchamp, presented to the ED with four days of flank pain.     Workup CT notable 2.5mm stone in distal left ureter. Urology consulted to evaluate.     Pt seen and examined at bedside. Since he presented to the ED he reports pain is well controlled and currently without symptoms. He denies experiencing any fever, vomiting, dysuria, hematuria or chills.        PAST MEDICAL & SURGICAL HISTORY:  Hypertension      Nephrolithiasis      Diabetes      Migraines      Renal stones      Renal infection      Tear of meniscus of knee      Hyperlipidemia      OA (osteoarthritis)      H/O arthroscopy of right knee  repair of torn meniscus      History of lithotripsy      Mass of left kidney          MEDICATIONS  (STANDING):  hydrocortisone 2.5% Cream 1 Application(s) Topical Once    MEDICATIONS  (PRN):      FAMILY HISTORY:  Family history of essential hypertension (Grandparent)        Allergies    No Known Allergies    Intolerances        SOCIAL HISTORY:    REVIEW OF SYSTEMS: Otherwise negative as stated in HPI    Physical Exam  Vital signs  T(C): 36.6 (24 @ 08:59), Max: 36.7 (24 @ 22:39)  HR: 67 (24 @ 08:59)  BP: 143/74 (24 @ 08:59)  SpO2: 100% (24 @ 08:59)  Wt(kg): --    Output      Gen: NAD  Pulm: No respiratory distress	  GI: S/ND/NT  : no CVA tenderness bilaterally   MSK: moves all 4 limbs spontaneously    LABS:       @ 04:30    WBC --    / Hct --    / SCr 1.88      @ 04:08    WBC 11.97 / Hct 43.7  / SCr --           141  |  104  |  24<H>  ----------------------------<  128<H>  4.4   |  22  |  1.88<H>    Ca    9.5      2024 04:30    TPro  7.6  /  Alb  3.8  /  TBili  0.5  /  DBili  x   /  AST  24  /  ALT  19  /  AlkPhos  101        Urinalysis Basic - ( 2024 06:05 )    Color: Dark Yellow / Appearance: Turbid / S.020 / pH: x  Gluc: x / Ketone: Trace mg/dL  / Bili: Negative / Urobili: 1.0 mg/dL   Blood: x / Protein: 100 mg/dL / Nitrite: Negative   Leuk Esterase: Large / RBC: 264 /HPF /  /HPF   Sq Epi: x / Non Sq Epi: 2 /HPF / Bacteria: Moderate /HPF            Urine Cx: pending     RADIOLOGY:   24 CT stone hunt  2.5 mm stone in the left ureter at the level of the lateral pelvic   sidewall. Mild left hydronephrosis.

## 2024-01-14 NOTE — CONSULT NOTE ADULT - ATTENDING COMMENTS
Agree with assessment and plan.     Pain controlled  small left distal ureteral stone.  medical expulsive therapy for ureteral stone   bactrim    Followup with primary urologist for treatment of ureteral stone.    Gonzalo Armstrong MD  [address] [place] [address]  [address]  [place] [address] [phone] Agree with assessment and plan.     Pain controlled  small left distal ureteral stone.  medical expulsive therapy for ureteral stone   bactrim    Followup with primary urologist for treatment of ureteral stone.    Gonzalo Armstrong MD  [address] [place] [address]  [address]  [place] [address] [phone]

## 2024-01-14 NOTE — ED PROVIDER NOTE - CLINICAL SUMMARY MEDICAL DECISION MAKING FREE TEXT BOX
58-year-old past medical history insulin dependent diabetes presents with left flank pain.  Has been going on since Friday.  Has a history of kidney stones says it feels relatively similar.  No dysuria no fevers.  Abdomen soft nontender nondistended.  He has minimal left CVA tenderness to palpation.  Patient is otherwise well-appearing.  Vital signs reviewed and within normal limits.  Will obtain basic screening labs including urinalysis to rule out UTI.  Obtain CT renal stone hunt to rule out nephrolithiasis.  Pain control with Toradol will reassess patient after diagnostics and pharmacologic interventions

## 2024-01-14 NOTE — ED PROVIDER NOTE - NSFOLLOWUPINSTRUCTIONS_ED_ALL_ED_FT
It was a pleasure caring for you today!    You were seen in the ER today for flank pain. Your CT scan showed a 2.5 mm stone. Please take Flomax once daily and Cefpodoxime 200 mg twice daily for 10 days.     Please follow up with your primary care doctor within 1 - 3 days. Call and let them know you were seen in the ER today.   Bring the results of your blood work and imaging with you to your appointment, if applicable.    For pain, please take acetaminophen 650 mg every 6 hours for pain. Additionally, you can also take ibuprofen 400 mg every 6-8 hours for pain.    Return to the ER for any worsening symptoms or concerns, including chest pain, shortness of breath, lightheadedness, weakness, or any other concerns.

## 2024-01-14 NOTE — ED PROVIDER NOTE - NSPTACCESSSVCSAPPTDETAILS_ED_ALL_ED_FT
patient needs follow up with urology if he cannot get an appointment with his urologist, stone and possible infection

## 2024-01-15 LAB
CULTURE RESULTS: SIGNIFICANT CHANGE UP
CULTURE RESULTS: SIGNIFICANT CHANGE UP
SPECIMEN SOURCE: SIGNIFICANT CHANGE UP
SPECIMEN SOURCE: SIGNIFICANT CHANGE UP

## 2024-01-18 ENCOUNTER — APPOINTMENT (OUTPATIENT)
Dept: PULMONOLOGY | Facility: CLINIC | Age: 59
End: 2024-01-18
Payer: MEDICARE

## 2024-01-18 VITALS
HEART RATE: 86 BPM | BODY MASS INDEX: 36.73 KG/M2 | OXYGEN SATURATION: 96 % | DIASTOLIC BLOOD PRESSURE: 80 MMHG | RESPIRATION RATE: 16 BRPM | WEIGHT: 248 LBS | TEMPERATURE: 97 F | SYSTOLIC BLOOD PRESSURE: 128 MMHG | HEIGHT: 69 IN

## 2024-01-18 PROCEDURE — 99215 OFFICE O/P EST HI 40 MIN: CPT

## 2024-01-18 NOTE — PHYSICAL EXAM
[General Appearance - Well Developed] : well developed [Normal Appearance] : normal appearance [General Appearance - Well Nourished] : well nourished [Normal Oropharynx] : normal oropharynx [II] : II [Neck Appearance] : the appearance of the neck was normal [Neck Cervical Mass (___cm)] : no neck mass was observed [Apical Impulse] : the apical impulse was normal [Heart Rate And Rhythm] : heart rate was normal and rhythm regular [Heart Sounds] : normal S1 and S2 [] : no respiratory distress [Respiration, Rhythm And Depth] : normal respiratory rhythm and effort [Auscultation Breath Sounds / Voice Sounds] : lungs were clear to auscultation bilaterally [Abnormal Walk] : normal gait [Cyanosis, Localized] : no localized cyanosis [Petechial Hemorrhages (___cm)] : no petechial hemorrhages [FreeTextEntry1] : +clubbing [No Focal Deficits] : no focal deficits [Oriented To Time, Place, And Person] : oriented to person, place, and time [Impaired Insight] : insight and judgment were intact [Affect] : the affect was normal

## 2024-01-18 NOTE — HISTORY OF PRESENT ILLNESS
[Obstructive Sleep Apnea] : obstructive sleep apnea [AHI: ___ per hour] : Apnea-hypopnea index:  [unfilled] per hour [T90%: ___] : T90%: [unfilled]% [Robb desatuation%: ___] : Robb desaturation:  [unfilled]% [Date: ___] : the most recent therapeutic polysomnogram was completed [unfilled] [CPAP: ___ cmH2O] : CPAP: [unfilled] cmH2O [To Bed: ___] : ~he/she~ goes to bed at [unfilled] [Arises: ___] : arises at [unfilled] [Sleep Onset Latency: ___ minutes] : sleep onset latency of [unfilled] minutes reported [Nocturnal Awakenings: ___] : ~he/she~ typically has [unfilled] nocturnal awakenings [Snoring] : snoring [Witnessed Apneas] : witnessed sleep apnea [Frequent Nocturnal Awakening] : frequent nocturnal awakening [Unintentional Sleep while Active] : no unintentional sleep while active [Unintentional Sleep While Inactive] : no unintentional sleep while inactive [Awakes Unrefreshed] : awakening unrefreshed [Awakes with Headache] : no headache upon awakening [Awakening With Dry Mouth] : awakening with dry mouth [Recent  Weight Gain] : recent weight gain [DIS] : DIS [DMS] : no DMS [Unusual Sleep Behavior] : no unusual sleep behavior [Hypersomnolence] : hypersomnolence [Cataplexy] : no cataplexy [Sleep Paralysis] : no sleep paralysis [Hypnagogic Hallucinations] : no hallucinations when falling asleep [Hypnopompic Hallucinations] : no hallucinations when awakening [ESS] : 17 [FreeTextEntry1] : F&P Jaswinder FFM, Medium

## 2024-01-18 NOTE — ASSESSMENT
[FreeTextEntry1] : This is a 58 year old male with a past medical history of severe SALLIE, cough, and tobacco use who comes in to [facility] care. The patient's main reason for the visit is to discuss the management of his diagnosed severe sleep apnea and to prepare for the initiation of CPAP therapy.  Severe Sleep Apnea Plan: 1. Ensure the patient receives the CPAP machine as scheduled. 2. Educate the patient on the importance of CPAP therapy and its expected benefits. 3. Address any concerns regarding mask fit and comfort; offer to arrange a meeting with a technician if necessary. 4. Instruct the patient to use the CPAP machine every night and to give the initial mask a chance. 5. Advise the patient to report any issues with understanding the machine's use or if the mask is uncomfortable.  Return to Clinic: The patient should follow up in two months to review the effectiveness of CPAP therapy and address any other concerns. If there are issues with the CPAP machine or mask before then, the patient is advised to return sooner to meet with the technician.

## 2024-01-26 ENCOUNTER — APPOINTMENT (OUTPATIENT)
Dept: ORTHOPEDIC SURGERY | Facility: CLINIC | Age: 59
End: 2024-01-26
Payer: MEDICARE

## 2024-01-26 VITALS — HEIGHT: 69 IN | WEIGHT: 250 LBS | BODY MASS INDEX: 37.03 KG/M2

## 2024-01-26 DIAGNOSIS — M16.11 UNILATERAL PRIMARY OSTEOARTHRITIS, RIGHT HIP: ICD-10-CM

## 2024-01-26 PROCEDURE — 99214 OFFICE O/P EST MOD 30 MIN: CPT

## 2024-01-29 ENCOUNTER — APPOINTMENT (OUTPATIENT)
Dept: PHYSICAL MEDICINE AND REHAB | Facility: CLINIC | Age: 59
End: 2024-01-29

## 2024-01-29 NOTE — HISTORY OF PRESENT ILLNESS
[FreeTextEntry1] : RAFAT KANG had [] injection on []. Today patient is here for follow up. Patient reports greater than 80% reduction in pain as tested by the NRS pain scale (Pain went down from 10/10 on NRS prior to injection). Patient also reports improvement in quality of life. In addition, patient reports improvement of function and ADLs along with a temporary decrease in pain medication use. Pain is 0/10 on NRS at this time.   Denies any new pain, numbness or weakness, bowel/bladder dysfunction, saddle anesthesia, fevers, chills, weight loss, night pain, or night sweats at this time.

## 2024-02-07 ENCOUNTER — APPOINTMENT (OUTPATIENT)
Dept: PHYSICAL MEDICINE AND REHAB | Facility: CLINIC | Age: 59
End: 2024-02-07
Payer: MEDICARE

## 2024-02-07 DIAGNOSIS — E11.9 TYPE 2 DIABETES MELLITUS W/OUT COMPLICATIONS: ICD-10-CM

## 2024-02-07 DIAGNOSIS — E66.9 OVERWEIGHT: ICD-10-CM

## 2024-02-07 DIAGNOSIS — E66.3 OVERWEIGHT: ICD-10-CM

## 2024-02-07 PROCEDURE — 99214 OFFICE O/P EST MOD 30 MIN: CPT

## 2024-02-07 NOTE — PHYSICAL EXAM
[FreeTextEntry1] : General exam   Constitutional: The patient appears well-developed, well-nourished, and in no apparent distress. Patient is well-groomed.    Skin: The skin is warm and dry, with normal turgor.  Eyes: PERRL.    ENMT: Ears: Hearing is grossly within normal limits.    Neck: Supple: The neck is supple.    Respiratory: Inspection: Breathing unlabored.    Neurologic: Alert and oriented x 3.   Psychiatric: Patient is cooperative and appropriate.  Mood and affect are normal.  Patient's insight is good, and memory and judgment are intact.  Right knee Skin c/d/i without any erythema, swelling, effusion or tenderness +Creitus antalgic gait with SC

## 2024-02-07 NOTE — HISTORY OF PRESENT ILLNESS
[FreeTextEntry1] : RAFAT KANG had CONFIRMATORY RIGHT KNEE GENICULAR NERVE BLOCK WITH FLUOROSCOPIC GUIDANCE on 1/5/24. Today patient is here for follow up. Patient reports greater than 80% reduction in pain as tested by the NRS pain scale for over 24 hours (Pain went down from 10/10 on NRS prior to injection to 0/10, as demonstrated by pain diary). Patient also reports improvement in quality of life. In addition, patient reports improvement of function and ADLs along with a temporary decrease in pain medication use. Pain is returning and is over 6/10 on NRS at this time. He wishes to proceed with RFA at this time.  He is also planning to have THR in september of 2024. He was advised to loose weight before his surgery.   Denies any new pain, numbness or weakness, bowel/bladder dysfunction, saddle anesthesia, fevers, chills, weight loss, night pain, or night sweats at this time.

## 2024-02-07 NOTE — ASSESSMENT
[FreeTextEntry1] : Mr. RAFAT KANG is a 58 year M with pain in the right hip due to hip osteoarthritis relieved with steroid injections. Now with right knee pain due to acute on chronic exacerbation from meniscal injury and repair and DJD. He responded well to genicular nerve blocks x2 and wishes to have RFA.  Patient reassured and educated on the diagnosis and treatment options. Risks and benefits of treatment and of delaying treatment discussed with patient. Risks discussed include but not limited to: progression of symptoms, worsening pain and functional status, etc.  This note was generated using Dragon medical dictation software. A reasonable effort had been made for proofreading its contents, but spelling mistakes or grammatical errors may still remain. If there are any questions or points of clarification needed please notify my office.  Pain diary reviewed Referring to nutritionist for weight loss guidance  Sending patient for PT for RIGHT KNEE PAIN to help relieve pain and improve function. Stretching, strengthening, ROM, home education and other appropriate interventions. Precautions include fall prevention.  Patient had tried and failed conservative treatment. This includes but is not limited to: Acetaminophen, NSAIDs, muscle relaxants, neuropathic medications, physician directed home exercises and/or physical therapy for at least 8 weeks. After a thorough discussion of risks and benefits patient would like to proceed with RIGHT KNEE GENICULAR NERVE RADIOFREQUENCY ABLATION WITH FLUOROSCOPIC GUIDANCE.  Risks and benefits of having injection discussed with patient. Risks discussed included, but not limited to: pain, infection, bleeding requiring emergency surgery, headaches, damage to vital organs, etc.  At this time, patient appears to be psychologically stable. Based on the history, physical exam and diagnostic findings, patient will benefit from this procedure. The goal of this procedure is to reduce pain and inflammation, improve function and range of motion and to further promote increased activity and return to previous level of functioning. The ultimate goal of performing this procedure is to improve patient's quality of life.  Asking for authorization for  RIGHT KNEE GENICULAR NERVE RADIOFREQUENCY ABLATION WITH FLUOROSCOPIC GUIDANCE to help treat patients pain. Patient will be scheduled for this procedure.  I have personally spent a total of at least 35 minutes preparing, reviewing internal and external records, explaining, counseling, and coordinating care for this patient encounter.  Patient was advised if the following symptoms develop: chills, fever, loss of bladder control, bowel incontinence or urinary retention, numbness/tingling or weakness is present in upper or lower extremities, to go to the nearest emergency room. This may be a new clinical condition not present at the time of the patient visit that may lead to paralysis and/or death. Patient advised if the above symptoms developed to also call the office immediately to inform us and to go to the nearest emergency room.  I work as part of an academic physical medicine and rehabilitation group and routinely have a physician in training (resident / fellow) working with me. Any part of the history and physical exam performed by the physician in training was either directly reviewed and/or replicated by myself. Any procedure performed by the physician in training was performed under my direct supervision and with the consent of the patient.

## 2024-03-01 ENCOUNTER — APPOINTMENT (OUTPATIENT)
Dept: PHYSICAL MEDICINE AND REHAB | Facility: CLINIC | Age: 59
End: 2024-03-01
Payer: MEDICARE

## 2024-03-01 ENCOUNTER — OUTPATIENT (OUTPATIENT)
Dept: OUTPATIENT SERVICES | Facility: HOSPITAL | Age: 59
LOS: 1 days | End: 2024-03-01
Payer: COMMERCIAL

## 2024-03-01 DIAGNOSIS — N28.89 OTHER SPECIFIED DISORDERS OF KIDNEY AND URETER: Chronic | ICD-10-CM

## 2024-03-01 DIAGNOSIS — Z98.890 OTHER SPECIFIED POSTPROCEDURAL STATES: Chronic | ICD-10-CM

## 2024-03-01 DIAGNOSIS — M25.561 PAIN IN RIGHT KNEE: ICD-10-CM

## 2024-03-01 PROCEDURE — 64624 DSTRJ NULYT AGT GNCLR NRV: CPT

## 2024-03-01 PROCEDURE — 77002 NEEDLE LOCALIZATION BY XRAY: CPT

## 2024-03-01 NOTE — PROCEDURE
[de-identified] : [facility] [address], [place], [address] - [phone]   PATIENT: RAFAT KANG MEDICAL RECORD: [id number] DATE OF PROCEDURE: 03/01/2024   PHYSICIAN: Zane Walter DO  RIGHT KNEE RADIOFREQUENCY ABLATION WITH FLUOROSCOPIC GUIDANCE  Levels treated: RIGHT knee genicular nerves  Complications: None  Estimated Blood Loss: None  Technique: After informed consent was obtained, patient was taken to the operating room and placed on the table in the supine position. All pressure points were supported. The lumbar area was then exposed and prepped with betadine x3 followed by chlorhexidine and draped in a standard sterile manner. The entirety of the procedure was done under sterile technique using sterile gloves, surgical mask and cap and all medication expiration dates were verified prior to being drawn into syringes.  Utilizing AP fluoroscopy the right tibiofemoral joint was visualized. Target sites were superiolateral and medial femoral condyles as well as the medial aspect of the proximal tibial shaft where it meets the condyle. Skin wheals were created at each of the target site using a 25G 1-1/2 inch needle and 3 mL of preservative free 1% lidocaine.  Following this, a 22G radiofrequency 5cm cannula with 5 mm active tip was inserted until bony contact was made at the condyle. This was repeated for all three areas. All three needles were positioned to a depth of ~50% of the femoral/tibial shafts under lateral fluoroscopy.  Motor testing was then conducted at 3 volts and 2 Hz and negative for lower extremity fasciculations. Prior to lesioning, 1mL of 0.25% Marcaine was injected through each needle after negative aspiration for heme. Lesioning was then carried out for 90 seconds at 80 degrees Celsius. Following lesioning, 1 mL of 10mg/mL Dexamethasone and 0.25% Marcaine was injected at each area without complication.    Patient tolerated the procedure well and was taken the phase II recovery in stable condition with bilateral lower extremity motor and sensory intact.

## 2024-03-06 ENCOUNTER — APPOINTMENT (OUTPATIENT)
Dept: ENDOCRINOLOGY | Facility: CLINIC | Age: 59
End: 2024-03-06
Payer: MEDICARE

## 2024-03-06 VITALS
HEART RATE: 92 BPM | TEMPERATURE: 97.4 F | SYSTOLIC BLOOD PRESSURE: 134 MMHG | RESPIRATION RATE: 16 BRPM | WEIGHT: 248 LBS | BODY MASS INDEX: 36.62 KG/M2 | OXYGEN SATURATION: 96 % | DIASTOLIC BLOOD PRESSURE: 70 MMHG

## 2024-03-06 LAB
GLUCOSE BLDC GLUCOMTR-MCNC: 266
HBA1C MFR BLD HPLC: 9.4

## 2024-03-06 PROCEDURE — 99204 OFFICE O/P NEW MOD 45 MIN: CPT | Mod: 25

## 2024-03-06 PROCEDURE — 83036 HEMOGLOBIN GLYCOSYLATED A1C: CPT | Mod: QW

## 2024-03-06 PROCEDURE — 95250 CONT GLUC MNTR PHYS/QHP EQP: CPT

## 2024-03-06 RX ORDER — DULAGLUTIDE 1.5 MG/.5ML
1.5 INJECTION, SOLUTION SUBCUTANEOUS
Refills: 0 | Status: DISCONTINUED | COMMUNITY
End: 2024-03-06

## 2024-03-06 RX ORDER — INSULIN GLARGINE 100 [IU]/ML
100 INJECTION, SOLUTION SUBCUTANEOUS AT BEDTIME
Qty: 1 | Refills: 0 | Status: DISCONTINUED | COMMUNITY
Start: 2023-03-04 | End: 2024-03-06

## 2024-03-06 RX ORDER — PIOGLITAZONE HYDROCHLORIDE 45 MG/1
45 TABLET ORAL DAILY
Qty: 90 | Refills: 2 | Status: ACTIVE | COMMUNITY
Start: 1900-01-01 | End: 1900-01-01

## 2024-03-06 RX ORDER — REPAGLINIDE 1 MG/1
1 TABLET ORAL DAILY
Refills: 0 | Status: DISCONTINUED | COMMUNITY
Start: 2023-03-04 | End: 2024-03-06

## 2024-03-06 RX ORDER — BLOOD-GLUCOSE SENSOR
EACH MISCELLANEOUS
Qty: 9 | Refills: 3 | Status: DISCONTINUED | COMMUNITY
Start: 2024-03-06 | End: 2024-03-06

## 2024-03-06 RX ORDER — METFORMIN HYDROCHLORIDE 500 MG/1
500 TABLET, COATED ORAL TWICE DAILY
Refills: 0 | Status: DISCONTINUED | COMMUNITY
End: 2024-03-06

## 2024-03-06 NOTE — HISTORY OF PRESENT ILLNESS
[FreeTextEntry1] : 58 yearM here for assessment for Type 2 diabetes mellitus   last A1c: 10.8%   Patient with past medical history as below, remarkable for recent hospitalization s/p resection of ? renal lesion   Self-reported weight gain Vision problems: stable High blood pressure:  yes Extreme hunger: no Frequent and/or recurring urinary infections: no Skin infections:  no  Slow healing of cuts: no     Screening Ophthalmology: fdue for visit Microalbumin: Cr: on cozaar EGFR: 41 LDL: on statin     Current diabetic medication regimen (verified with patient):  Lantus 20 units  Qhs trulicity 1.5mg (has not used in 1 year. Reports GI intolerance at doses >1.5mg)  metformin ER 500mg po bid actos 30mg repaglinide 1mg po daily       SMBG ranges (glucometer):  am: 140-150mg/dl PP: 200's

## 2024-03-06 NOTE — PHYSICAL EXAM
[Alert] : alert [Well Nourished] : well nourished [Obese] : obese [No Acute Distress] : no acute distress [Well Developed] : well developed [EOMI] : extra ocular movement intact [Normal Sclera/Conjunctiva] : normal sclera/conjunctiva [No Proptosis] : no proptosis [Normal Oropharynx] : the oropharynx was normal [Thyroid Not Enlarged] : the thyroid was not enlarged [No Thyroid Nodules] : no palpable thyroid nodules [No Respiratory Distress] : no respiratory distress [No Accessory Muscle Use] : no accessory muscle use [Clear to Auscultation] : lungs were clear to auscultation bilaterally [Normal S1, S2] : normal S1 and S2 [Normal Rate] : heart rate was normal [Regular Rhythm] : with a regular rhythm [No Edema] : no peripheral edema [Pedal Pulses Normal] : the pedal pulses are present [Normal Bowel Sounds] : normal bowel sounds [Not Tender] : non-tender [Not Distended] : not distended [Normal Anterior Cervical Nodes] : no anterior cervical lymphadenopathy [Soft] : abdomen soft [Normal Posterior Cervical Nodes] : no posterior cervical lymphadenopathy [No Spinal Tenderness] : no spinal tenderness [Spine Straight] : spine straight [No Stigmata of Cushings Syndrome] : no stigmata of Cushings Syndrome [Normal Gait] : normal gait [Normal Strength/Tone] : muscle strength and tone were normal [No Rash] : no rash [Normal Reflexes] : deep tendon reflexes were 2+ and symmetric [No Tremors] : no tremors [Oriented x3] : oriented to person, place, and time [Acanthosis Nigricans] : no acanthosis nigricans

## 2024-03-17 ENCOUNTER — EMERGENCY (EMERGENCY)
Facility: HOSPITAL | Age: 59
LOS: 1 days | Discharge: HOME | End: 2024-03-17
Attending: EMERGENCY MEDICINE | Admitting: EMERGENCY MEDICINE
Payer: MEDICARE

## 2024-03-17 VITALS
OXYGEN SATURATION: 98 % | RESPIRATION RATE: 18 BRPM | TEMPERATURE: 98 F | DIASTOLIC BLOOD PRESSURE: 79 MMHG | HEART RATE: 86 BPM | SYSTOLIC BLOOD PRESSURE: 135 MMHG

## 2024-03-17 VITALS
SYSTOLIC BLOOD PRESSURE: 129 MMHG | OXYGEN SATURATION: 96 % | RESPIRATION RATE: 16 BRPM | HEART RATE: 72 BPM | DIASTOLIC BLOOD PRESSURE: 78 MMHG

## 2024-03-17 DIAGNOSIS — Z98.890 OTHER SPECIFIED POSTPROCEDURAL STATES: Chronic | ICD-10-CM

## 2024-03-17 DIAGNOSIS — N28.89 OTHER SPECIFIED DISORDERS OF KIDNEY AND URETER: Chronic | ICD-10-CM

## 2024-03-17 LAB
ALBUMIN SERPL ELPH-MCNC: 4 G/DL — SIGNIFICANT CHANGE UP (ref 3.3–5)
ALP SERPL-CCNC: 98 U/L — SIGNIFICANT CHANGE UP (ref 40–120)
ALT FLD-CCNC: 19 U/L — SIGNIFICANT CHANGE UP (ref 4–41)
ANION GAP SERPL CALC-SCNC: 14 MMOL/L — SIGNIFICANT CHANGE UP (ref 7–14)
APPEARANCE UR: CLEAR — SIGNIFICANT CHANGE UP
AST SERPL-CCNC: 14 U/L — SIGNIFICANT CHANGE UP (ref 4–40)
BACTERIA # UR AUTO: ABNORMAL /HPF
BASOPHILS # BLD AUTO: 0.01 K/UL — SIGNIFICANT CHANGE UP (ref 0–0.2)
BASOPHILS NFR BLD AUTO: 0.1 % — SIGNIFICANT CHANGE UP (ref 0–2)
BILIRUB SERPL-MCNC: 0.3 MG/DL — SIGNIFICANT CHANGE UP (ref 0.2–1.2)
BILIRUB UR-MCNC: NEGATIVE — SIGNIFICANT CHANGE UP
BUN SERPL-MCNC: 26 MG/DL — HIGH (ref 7–23)
CALCIUM SERPL-MCNC: 9.5 MG/DL — SIGNIFICANT CHANGE UP (ref 8.4–10.5)
CHLORIDE SERPL-SCNC: 103 MMOL/L — SIGNIFICANT CHANGE UP (ref 98–107)
CO2 SERPL-SCNC: 23 MMOL/L — SIGNIFICANT CHANGE UP (ref 22–31)
COLOR SPEC: YELLOW — SIGNIFICANT CHANGE UP
COMMENT - URINE: SIGNIFICANT CHANGE UP
CREAT SERPL-MCNC: 1.01 MG/DL — SIGNIFICANT CHANGE UP (ref 0.5–1.3)
DIFF PNL FLD: NEGATIVE — SIGNIFICANT CHANGE UP
EGFR: 86 ML/MIN/1.73M2 — SIGNIFICANT CHANGE UP
EOSINOPHIL # BLD AUTO: 0.1 K/UL — SIGNIFICANT CHANGE UP (ref 0–0.5)
EOSINOPHIL NFR BLD AUTO: 1.2 % — SIGNIFICANT CHANGE UP (ref 0–6)
EPI CELLS # UR: PRESENT
GLUCOSE SERPL-MCNC: 277 MG/DL — HIGH (ref 70–99)
GLUCOSE UR QL: >=1000 MG/DL
HCT VFR BLD CALC: 39.3 % — SIGNIFICANT CHANGE UP (ref 39–50)
HGB BLD-MCNC: 13.6 G/DL — SIGNIFICANT CHANGE UP (ref 13–17)
IANC: 5.64 K/UL — SIGNIFICANT CHANGE UP (ref 1.8–7.4)
IMM GRANULOCYTES NFR BLD AUTO: 0.5 % — SIGNIFICANT CHANGE UP (ref 0–0.9)
KETONES UR-MCNC: NEGATIVE MG/DL — SIGNIFICANT CHANGE UP
LEUKOCYTE ESTERASE UR-ACNC: ABNORMAL
LYMPHOCYTES # BLD AUTO: 2.14 K/UL — SIGNIFICANT CHANGE UP (ref 1–3.3)
LYMPHOCYTES # BLD AUTO: 24.9 % — SIGNIFICANT CHANGE UP (ref 13–44)
MCHC RBC-ENTMCNC: 30.5 PG — SIGNIFICANT CHANGE UP (ref 27–34)
MCHC RBC-ENTMCNC: 34.6 GM/DL — SIGNIFICANT CHANGE UP (ref 32–36)
MCV RBC AUTO: 88.1 FL — SIGNIFICANT CHANGE UP (ref 80–100)
MONOCYTES # BLD AUTO: 0.66 K/UL — SIGNIFICANT CHANGE UP (ref 0–0.9)
MONOCYTES NFR BLD AUTO: 7.7 % — SIGNIFICANT CHANGE UP (ref 2–14)
NEUTROPHILS # BLD AUTO: 5.64 K/UL — SIGNIFICANT CHANGE UP (ref 1.8–7.4)
NEUTROPHILS NFR BLD AUTO: 65.6 % — SIGNIFICANT CHANGE UP (ref 43–77)
NITRITE UR-MCNC: NEGATIVE — SIGNIFICANT CHANGE UP
NRBC # BLD: 0 /100 WBCS — SIGNIFICANT CHANGE UP (ref 0–0)
NRBC # FLD: 0 K/UL — SIGNIFICANT CHANGE UP (ref 0–0)
PH UR: 5.5 — SIGNIFICANT CHANGE UP (ref 5–8)
PLATELET # BLD AUTO: 147 K/UL — LOW (ref 150–400)
POTASSIUM SERPL-MCNC: 4.2 MMOL/L — SIGNIFICANT CHANGE UP (ref 3.5–5.3)
POTASSIUM SERPL-SCNC: 4.2 MMOL/L — SIGNIFICANT CHANGE UP (ref 3.5–5.3)
PROT SERPL-MCNC: 7.7 G/DL — SIGNIFICANT CHANGE UP (ref 6–8.3)
PROT UR-MCNC: SIGNIFICANT CHANGE UP MG/DL
RBC # BLD: 4.46 M/UL — SIGNIFICANT CHANGE UP (ref 4.2–5.8)
RBC # FLD: 12.9 % — SIGNIFICANT CHANGE UP (ref 10.3–14.5)
RBC CASTS # UR COMP ASSIST: <50 /HPF — HIGH (ref 0–4)
SODIUM SERPL-SCNC: 140 MMOL/L — SIGNIFICANT CHANGE UP (ref 135–145)
SP GR SPEC: 1.02 — SIGNIFICANT CHANGE UP (ref 1–1.03)
UROBILINOGEN FLD QL: 1 MG/DL — SIGNIFICANT CHANGE UP (ref 0.2–1)
WBC # BLD: 8.59 K/UL — SIGNIFICANT CHANGE UP (ref 3.8–10.5)
WBC # FLD AUTO: 8.59 K/UL — SIGNIFICANT CHANGE UP (ref 3.8–10.5)
WBC UR QL: >50 /HPF — HIGH (ref 0–5)

## 2024-03-17 PROCEDURE — 74176 CT ABD & PELVIS W/O CONTRAST: CPT | Mod: 26,MC

## 2024-03-17 PROCEDURE — 99284 EMERGENCY DEPT VISIT MOD MDM: CPT | Mod: 25

## 2024-03-17 RX ORDER — CEFTRIAXONE 500 MG/1
1000 INJECTION, POWDER, FOR SOLUTION INTRAMUSCULAR; INTRAVENOUS ONCE
Refills: 0 | Status: COMPLETED | OUTPATIENT
Start: 2024-03-17 | End: 2024-03-17

## 2024-03-17 RX ORDER — CEFPODOXIME PROXETIL 100 MG
1 TABLET ORAL
Qty: 20 | Refills: 0
Start: 2024-03-17 | End: 2024-03-26

## 2024-03-17 RX ORDER — KETOROLAC TROMETHAMINE 30 MG/ML
15 SYRINGE (ML) INJECTION ONCE
Refills: 0 | Status: DISCONTINUED | OUTPATIENT
Start: 2024-03-17 | End: 2024-03-17

## 2024-03-17 RX ORDER — SODIUM CHLORIDE 9 MG/ML
1000 INJECTION INTRAMUSCULAR; INTRAVENOUS; SUBCUTANEOUS ONCE
Refills: 0 | Status: COMPLETED | OUTPATIENT
Start: 2024-03-17 | End: 2024-03-17

## 2024-03-17 RX ADMIN — Medication 15 MILLIGRAM(S): at 09:52

## 2024-03-17 RX ADMIN — CEFTRIAXONE 100 MILLIGRAM(S): 500 INJECTION, POWDER, FOR SOLUTION INTRAMUSCULAR; INTRAVENOUS at 11:17

## 2024-03-17 RX ADMIN — SODIUM CHLORIDE 1000 MILLILITER(S): 9 INJECTION INTRAMUSCULAR; INTRAVENOUS; SUBCUTANEOUS at 09:56

## 2024-03-17 NOTE — ED PROVIDER NOTE - DISCHARGE DATE
Procedure:  EGD WITH SINGLE BALLOON ENTEROSCOPY    Relevant Problems   CARDIO   (+) Aortoiliac occlusive disease (HCC)   (+) Coronary artery disease without angina pectoris   (+) DVT (deep venous thrombosis) (HCC)   (+) Essential hypertension   (+) Other hyperlipidemia      ENDO   (+) Type 2 diabetes mellitus, with long-term current use of insulin (HCC)      GI/HEPATIC   (+) GERD (gastroesophageal reflux disease)      /RENAL   (+) Stage 3a chronic kidney disease (HCC)      HEMATOLOGY   (+) Iron deficiency anemia      NEURO/PSYCH   (+) Atherosclerosis of native artery of both lower extremities with intermittent claudication (HCC)   (+) Claudication in peripheral vascular disease (HCC)        Physical Exam    Airway    Mallampati score: I         Dental   lower dentures,     Cardiovascular  Rhythm: regular, Rate: normal, Cardiovascular exam normal    Pulmonary  Pulmonary exam normal Breath sounds clear to auscultation,     Other Findings        Anesthesia Plan  ASA Score- 3     Anesthesia Type- IV sedation with anesthesia with ASA Monitors  Additional Monitors:   Airway Plan:           Plan Factors-Exercise tolerance (METS): >4 METS  Chart reviewed  EKG reviewed  Imaging results reviewed  Existing labs reviewed  Patient summary reviewed  Patient is not a current smoker  Patient did not smoke on day of surgery  Obstructive sleep apnea risk education given perioperatively  Induction- intravenous  Postoperative Plan-     Informed Consent- Anesthetic plan and risks discussed with patient  I personally reviewed this patient with the CRNA  Discussed and agreed on the Anesthesia Plan with the CRNA  Jacob Tineo 17-Mar-2024

## 2024-03-17 NOTE — ED PROVIDER NOTE - PATIENT PORTAL LINK FT
You can access the FollowMyHealth Patient Portal offered by [facility] by registering at the following website: [email or web address]. By joining [employer]’s FollowMyHealth portal, you will also be able to view your health information using other applications (apps) compatible with our system.

## 2024-03-17 NOTE — ED PROVIDER NOTE - CCCP TRG CHIEF CMPLNT
The patient is Watcher - Medium risk of patient condition declining or worsening    Shift Goals  Clinical Goals: Infant will meet shift minimum  Patient Goals: N/A  Family Goals: POB will successfully room in with infant    Progress made toward(s) clinical / shift goals:    Problem: Knowledge Deficit - NICU  Goal: Family will demonstrate ability to care for child  Outcome: Progressing  Note: POB successfully roomed in with infant overnight. POB asked appropriate questions. All questions answered at this time.      Problem: Thermoregulation  Goal: Patient's body temperature will be maintained (axillary temp 36.5-37.5 C)  Outcome: Progressing  Note: Infant has maintained an axillary body temperature of 36.5 and 36.7 C so far this shift. Infant is dressed and bundled in a sleep sack.      Problem: Nutrition / Feeding  Goal: Patient will maintain balanced nutritional intake  Outcome: Progressing  Note: Infant is ad mikael with a shift minimum of 129 ml. Infant met shift minimum and took 173 ml this shift. Infant tolerated well with no emesis and abdominal girths stable so far this shift.     Patient is not progressing towards the following goals:N/A       back pain

## 2024-03-17 NOTE — ED PROVIDER NOTE - PROGRESS NOTE DETAILS
I spoke with the patient and informed him of the UTI and that CT scan showed no signs of any kidney stones.  Patient was given a dose of ceftriaxone for the UTI and will be discharged with cefpodoxime.  I instructed him to take it 2 times a day for the next 10 days.  Patient understood my instructions. He also states his back pain is much improved from before    Chepe Cummins MD (PGY 1)

## 2024-03-17 NOTE — ED ADULT NURSE NOTE - CHIEF COMPLAINT QUOTE
Pt c/o right lower back pain 2x days. No relief from Ibuprofen. Denies injury or urinary symptoms. Hx DM2, HTN, HLD, nephrolithiasis,

## 2024-03-17 NOTE — ED ADULT NURSE NOTE - OBJECTIVE STATEMENT
Patient A&o X4, received in intake , with complaints of right sided back pain. Patient states, "I woke up yesterday morning and started having right sided back pain". Patient rates pain at 7 out of 10 currently, denies taking any medication pta. Patient denies any recent falls or trauma to back. Patient denies any urinary symptoms at this time. Patient able to speak in clear sentences, respirations equal and unlabored. Lung sounds clear b/l, equal chest rise and fall noted. Denies CP/SOB, fever, chills, nausea, vomiting and diarrhea at this time. Skin warm and dry. Provider at bedside for eval, pending further orders.

## 2024-03-17 NOTE — ED ADULT NURSE NOTE - NSFALLUNIVINTERV_ED_ALL_ED
Bed/Stretcher in lowest position, wheels locked, appropriate side rails in place/Call bell, personal items and telephone in reach/Instruct patient to call for assistance before getting out of bed/chair/stretcher/Non-slip footwear applied when patient is off stretcher/[place] to call system/Physically safe environment - no spills, clutter or unnecessary equipment/Purposeful proactive rounding/Room/bathroom lighting operational, light cord in reach

## 2024-03-17 NOTE — ED PROVIDER NOTE - OBJECTIVE STATEMENT
58-year-old male with past medical history of diabetes, hypertension, hyperlipidemia, kidney stones presenting emergency department due to right-sided back pain that started yesterday.  Patient states pain is 7 out of 10 in intensity, he is taken ibuprofen with some relief.  Patient denies any fevers, chills, body aches, spine pain, weakness numbness to extremities, dysuria, abdominal pain, vomiting, diarrhea.

## 2024-03-17 NOTE — ED ADULT NURSE NOTE - NS ED PATIENT SAFETY CONCERN
Follow-up with GI provider and your primary care provider for reevaluation. Call for appointment as soon as possible. No

## 2024-03-17 NOTE — ED PROVIDER NOTE - NSFOLLOWUPINSTRUCTIONS_ED_ALL_ED_FT
Urinary Tract Infection    A urinary tract infection (UTI) is an infection of any part of the urinary tract, which includes the kidneys, ureters, bladder, and urethra. Risk factors include ignoring your need to urinate, wiping back to front if female, being an uncircumcised male, and having diabetes or a weak immune system. Symptoms include frequent urination, pain or burning with urination, foul smelling urine, cloudy urine, pain in the lower abdomen, blood in the urine, and fever. If you were prescribed an antibiotic medicine, take it as told by your health care provider. Do not stop taking the antibiotic even if you start to feel better.    SEEK IMMEDIATE MEDICAL CARE IF YOU HAVE ANY OF THE FOLLOWING SYMPTOMS: severe back or abdominal pain, fever, inability to keep fluids or medicine down, dizziness/lightheadedness, or a change in mental status.    Take the cefpodoxime 200mg twice per day for 10 days. Follow up with your PCP within the next week

## 2024-03-17 NOTE — ED PROVIDER NOTE - CLINICAL SUMMARY MEDICAL DECISION MAKING FREE TEXT BOX
58-year-old male with past medical history of diabetes, hypertension, lipidemia, kidney stones presenting to emergency department due to back pain since yesterday.  Patient states pain is in the costovertebral area does not radiate anywhere.  Pain is 7 out of 10 intensity patient has mild relief with Ibuprofen.  Patient is afebrile in the emergency department.  On exam the patient has right-sided CVA tenderness and no abdominal tenderness to palpation.  Concern for kidney stone versus pyelonephritis.  Will order CBC, CMP, UA, urine culture to evaluate for any signs of hematuria indicative of kidney stones or UTI concerning for pyelonephritis. Sandip att: 58-year-old male with past medical history of diabetes, hypertension, lipidemia, kidney stones presenting to emergency department due to back pain since yesterday.  Patient states pain is in the costovertebral area does not radiate anywhere.  Pain is 7 out of 10 intensity patient has mild relief with Ibuprofen.  Patient is afebrile in the emergency department.  On exam the patient has right-sided CVA tenderness and no abdominal tenderness to palpation.  Concern for kidney stone versus pyelonephritis.  Will order CBC, CMP, UA, urine culture to evaluate for any signs of hematuria indicative of kidney stones or UTI concerning for pyelonephritis.

## 2024-03-19 LAB
CULTURE RESULTS: ABNORMAL
SPECIMEN SOURCE: SIGNIFICANT CHANGE UP

## 2024-03-26 ENCOUNTER — APPOINTMENT (OUTPATIENT)
Dept: PHYSICAL MEDICINE AND REHAB | Facility: CLINIC | Age: 59
End: 2024-03-26
Payer: MEDICARE

## 2024-03-26 PROCEDURE — 99213 OFFICE O/P EST LOW 20 MIN: CPT

## 2024-03-26 NOTE — PHYSICAL EXAM
[FreeTextEntry1] : General exam   Constitutional: The patient appears well-developed, well-nourished, and in no apparent distress. Patient is well-groomed.    Skin: The skin is warm and dry, with normal turgor.  Eyes: PERRL.    ENMT: Ears: Hearing is grossly within normal limits.    Neck: Supple: The neck is supple.    Respiratory: Inspection: Breathing unlabored.    Neurologic: Alert and oriented x 3.   Psychiatric: Patient is cooperative and appropriate.  Mood and affect are normal.  Patient's insight is good, and memory and judgment are intact.  Right knee Skin c/d/i without any erythema, swelling, effusion or tenderness  Right hip Antalgic gait FABERE/FADIR +

## 2024-03-26 NOTE — HISTORY OF PRESENT ILLNESS
[FreeTextEntry1] : RAFAT KANG had RIGHT KNEE RADIOFREQUENCY ABLATION WITH FLUOROSCOPIC GUIDANCE on 3/1/24. Today patient is here for follow up. Patient reports that he still has knee pain but it is not constant, and rather intermittent. Pain is still over 5/10 on NRS at this time over the RIGHT knee.  He is also noting exacerbation of her RIGHT hip pains. He is planning to have replacement in the fall. However he would like to repeat the hip joint steroid injection within the next month or so as his hip pain is close to 10/10 on NRS and is affecting his walking.   Denies any new pain, numbness or weakness, bowel/bladder dysfunction, saddle anesthesia, fevers, chills, weight loss, night pain, or night sweats at this time.

## 2024-03-26 NOTE — ASSESSMENT
[FreeTextEntry1] : Mr. RAFAT KANG is a 58 year M with pain in the right hip due to hip osteoarthritis relieved with steroid injections. Now with right knee pain due to acute on chronic exacerbation from meniscal injury and repair and DJD. He responded well to genicular nerve blocks x2 and is now s/p RFA with slowly improving symptoms.  Patient reassured and educated on the diagnosis and treatment options. Risks and benefits of treatment and of delaying treatment discussed with patient. Risks discussed include but not limited to: progression of symptoms, worsening pain and functional status, etc.  This note was generated using Dragon medical dictation software. A reasonable effort had been made for proofreading its contents, but spelling mistakes or grammatical errors may still remain. If there are any questions or points of clarification needed please notify my office.  ICE TID Rx for Lidocaine cream  Patient had tried and failed conservative treatment. This includes but is not limited to: Acetaminophen, NSAIDs, muscle relaxants, neuropathic medications, physician directed home exercises and/or physical therapy for at least 8 weeks. After a thorough discussion of risks and benefits patient would like to proceed with RIGHT HIP INTRARTICULAR JOINT STEROID INJECTION WITH FLUOROSCOPIC GUIDANCE  Risks and benefits of having injection discussed with patient. Risks discussed included, but not limited to: pain, infection, bleeding requiring emergency surgery, headaches, damage to vital organs, etc.  At this time, patient appears to be psychologically stable. Based on the history, physical exam and diagnostic findings, patient will benefit from this procedure. The goal of this procedure is to reduce pain and inflammation, improve function and range of motion and to further promote increased activity and return to previous level of functioning. The ultimate goal of performing this procedure is to improve patient's quality of life.  Asking for authorization for RIGHT HIP INTRARTICULAR JOINT STEROID INJECTION WITH FLUOROSCOPIC GUIDANCE to help treat patients pain.  Patient will be scheduled for this procedure.  Patient was advised if the following symptoms develop: chills, fever, loss of bladder control, bowel incontinence or urinary retention, numbness/tingling or weakness is present in upper or lower extremities, to go to the nearest emergency room. This may be a new clinical condition not present at the time of the patient visit that may lead to paralysis and/or death. Patient advised if the above symptoms developed to also call the office immediately to inform us and to go to the nearest emergency room.

## 2024-03-27 DIAGNOSIS — M25.561 PAIN IN RIGHT KNEE: ICD-10-CM

## 2024-03-27 DIAGNOSIS — G89.29 PAIN IN RIGHT KNEE: ICD-10-CM

## 2024-03-27 RX ORDER — LIDOCAINE 4% 4 G/100G
4 CREAM TOPICAL
Qty: 1 | Refills: 1 | Status: ACTIVE | COMMUNITY
Start: 2024-03-27 | End: 1900-01-01

## 2024-04-01 ENCOUNTER — OUTPATIENT (OUTPATIENT)
Dept: OUTPATIENT SERVICES | Facility: HOSPITAL | Age: 59
LOS: 1 days | End: 2024-04-01
Payer: MEDICARE

## 2024-04-01 VITALS
WEIGHT: 246.92 LBS | HEART RATE: 86 BPM | TEMPERATURE: 99 F | DIASTOLIC BLOOD PRESSURE: 82 MMHG | HEIGHT: 69 IN | OXYGEN SATURATION: 97 % | RESPIRATION RATE: 18 BRPM | SYSTOLIC BLOOD PRESSURE: 133 MMHG

## 2024-04-01 DIAGNOSIS — E78.5 HYPERLIPIDEMIA, UNSPECIFIED: ICD-10-CM

## 2024-04-01 DIAGNOSIS — Z01.818 ENCOUNTER FOR OTHER PREPROCEDURAL EXAMINATION: ICD-10-CM

## 2024-04-01 DIAGNOSIS — F20.9 SCHIZOPHRENIA, UNSPECIFIED: ICD-10-CM

## 2024-04-01 DIAGNOSIS — G47.33 OBSTRUCTIVE SLEEP APNEA (ADULT) (PEDIATRIC): ICD-10-CM

## 2024-04-01 DIAGNOSIS — N20.0 CALCULUS OF KIDNEY: ICD-10-CM

## 2024-04-01 DIAGNOSIS — Z98.890 OTHER SPECIFIED POSTPROCEDURAL STATES: Chronic | ICD-10-CM

## 2024-04-01 DIAGNOSIS — E11.9 TYPE 2 DIABETES MELLITUS WITHOUT COMPLICATIONS: ICD-10-CM

## 2024-04-01 DIAGNOSIS — J84.9 INTERSTITIAL PULMONARY DISEASE, UNSPECIFIED: ICD-10-CM

## 2024-04-01 DIAGNOSIS — K21.9 GASTRO-ESOPHAGEAL REFLUX DISEASE WITHOUT ESOPHAGITIS: ICD-10-CM

## 2024-04-01 DIAGNOSIS — F41.9 ANXIETY DISORDER, UNSPECIFIED: ICD-10-CM

## 2024-04-01 DIAGNOSIS — M16.11 UNILATERAL PRIMARY OSTEOARTHRITIS, RIGHT HIP: ICD-10-CM

## 2024-04-01 DIAGNOSIS — I10 ESSENTIAL (PRIMARY) HYPERTENSION: ICD-10-CM

## 2024-04-01 DIAGNOSIS — N28.89 OTHER SPECIFIED DISORDERS OF KIDNEY AND URETER: Chronic | ICD-10-CM

## 2024-04-01 DIAGNOSIS — F17.200 NICOTINE DEPENDENCE, UNSPECIFIED, UNCOMPLICATED: ICD-10-CM

## 2024-04-01 LAB
APPEARANCE UR: ABNORMAL
BACTERIA # UR AUTO: ABNORMAL /HPF
BILIRUB UR-MCNC: NEGATIVE — SIGNIFICANT CHANGE UP
COLOR SPEC: YELLOW — SIGNIFICANT CHANGE UP
COMMENT - URINE: SIGNIFICANT CHANGE UP
DIFF PNL FLD: ABNORMAL
EPI CELLS # UR: PRESENT
GLUCOSE UR QL: >=1000 MG/DL
KETONES UR-MCNC: ABNORMAL MG/DL
LEUKOCYTE ESTERASE UR-ACNC: ABNORMAL
NITRITE UR-MCNC: NEGATIVE — SIGNIFICANT CHANGE UP
PH UR: 5.5 — SIGNIFICANT CHANGE UP (ref 5–8)
PROT UR-MCNC: ABNORMAL MG/DL
RBC CASTS # UR COMP ASSIST: 2 /HPF — SIGNIFICANT CHANGE UP (ref 0–4)
SP GR SPEC: 1.03 — SIGNIFICANT CHANGE UP (ref 1–1.03)
UROBILINOGEN FLD QL: 1 MG/DL — SIGNIFICANT CHANGE UP (ref 0.2–1)
WBC UR QL: >50 /HPF — HIGH (ref 0–5)

## 2024-04-01 RX ORDER — BUPROPION HYDROCHLORIDE 150 MG/1
1 TABLET, EXTENDED RELEASE ORAL
Qty: 0 | Refills: 0 | DISCHARGE

## 2024-04-01 RX ORDER — SODIUM CHLORIDE 9 MG/ML
3 INJECTION INTRAMUSCULAR; INTRAVENOUS; SUBCUTANEOUS EVERY 8 HOURS
Refills: 0 | Status: DISCONTINUED | OUTPATIENT
Start: 2024-04-08 | End: 2024-04-08

## 2024-04-01 RX ORDER — ARIPIPRAZOLE 15 MG/1
1 TABLET ORAL
Qty: 0 | Refills: 0 | DISCHARGE

## 2024-04-01 RX ORDER — LOSARTAN POTASSIUM 100 MG/1
1 TABLET, FILM COATED ORAL
Qty: 0 | Refills: 0 | DISCHARGE

## 2024-04-01 NOTE — H&P PST ADULT - PROBLEM SELECTOR PLAN 3
Diagnosed with severe SALLIE in July 2023-on CPAP-has not been using it lately due to discomfort.  Pulmonary precautions to be maintained perioperatively.   Sleep study report obtained and attached to chart.

## 2024-04-01 NOTE — H&P PST ADULT - NSICDXPASTMEDICALHX_GEN_ALL_CORE_FT
PAST MEDICAL HISTORY:  Diabetes     Hyperlipidemia     Hypertension     Migraines     Nephrolithiasis     OA (osteoarthritis)     Renal infection     Renal stones     Tear of meniscus of knee      PAST MEDICAL HISTORY:  Diabetes     Hyperlipidemia     Hypertension     ILD (interstitial lung disease)     Migraines     Nephrolithiasis     OA (osteoarthritis)     SALLIE on CPAP     Renal infection     Renal stones     Tear of meniscus of knee

## 2024-04-01 NOTE — H&P PST ADULT - LYMPHATICS COMMENTS
Coordination of Care  1. Have you been to the ER, urgent care clinic since your last visit? Hospitalized since your last visit? Yes When: 4/15 [facility] doctors for abdominal pain, SOB, and back pain. 2. Have you seen or consulted any other health care providers outside of the [address] [place] [address] since your last visit? Include any pap smears or colon screening. No    Does the patient need refills? NO    Learning Assessment Complete?  yes  Depression Screening complete in the past 12 months? yes           [address] not examined

## 2024-04-01 NOTE — H&P PST ADULT - PROBLEM SELECTOR PLAN 4
Instructed to continue use of inhalers and use same on day of surgery.  Instructed to bring inhalers to hospital on day of surgery.  Follow-up with PCP/pulmonologist for management

## 2024-04-01 NOTE — H&P PST ADULT - PROBLEM SELECTOR PLAN 1
Pt for right extracorporeal shockwave lithotripsy on 4/08/2024.  Preoperative instructions discussed with pt and copy of instructions given to pt. Instructed pt that he will need someone to escort him home after surgery, to notify security when he arrives in the lobby of the hospital that he is here for surgery, not to eat or drink anything after midnight the night before the surgery, to avoid NSAIDs such as Ibuprofen, Motrin, aleve, Advil, naproxen before surgery, to take Tylenol if needed for pain, to report if he has been exposed to any one with any contagious diseases including Covid-19 or if she is exhibiting any symptoms of COVID-19. Instructed about use of Chlorhexidine 4% soap for 3 days before surgery including the morning of surgery. Verbalized understanding of instructions given.

## 2024-04-01 NOTE — H&P PST ADULT - PROBLEM SELECTOR PLAN 2
Instructed to continue antihypertensive meds and take them with sips of water on day of surgery.   Follow-up with PCP for management.

## 2024-04-01 NOTE — H&P PST ADULT - PROBLEM SELECTOR PLAN 6
Instructed pt to continue meds, to take them with sips of water on day of surgery and to follow-up with providers for management.  Discussed with pt availability of free hotline number 343 that can be used in times of crisis.

## 2024-04-01 NOTE — H&P PST ADULT - PROBLEM SELECTOR PLAN 8
Instructed pt to continue meds, to take them with sips of water on day of surgery and to follow-up with providers for management.  Discussed with pt availability of free hotline number 751 that can be used in times of crisis.

## 2024-04-01 NOTE — H&P PST ADULT - PROBLEM SELECTOR PLAN 9
Instructed to continue Omeprazole and take it with sips of water on day of surgery.   Follow-up with provider for management.

## 2024-04-01 NOTE — H&P PST ADULT - HISTORY OF PRESENT ILLNESS
This is a yr old male with PMH of Diabetes on Trulicity, Lantus and oral Hypoglycemics, HTN, HLD, GERD, depression, anxiety, schizophrenia who presents with c/o right renal stone. Denies any nausea, vomiting, hematuria, dysuria. Pt for right extracorporeal shockwave lithotripsy on 4/08/2024.   This is a 58 yr old  male with PMH of Diabetes-last HgA1C 8.0 as per pt, on Trulicity, Lantus and oral Hypoglycemics, HTN, HLD, GERD, depression, anxiety, schizophrenia who presents with c/o right renal stone. Denies any nausea, vomiting, hematuria, dysuria. Pt for right extracorporeal shockwave lithotripsy on 4/08/2024.   This is a 58 yr old  male with PMH of severe SALLIE on CPAP, interstitial lung disease, Diabetes-last HgA1C 8.0 as per pt, on Trulicity, Lantus and oral Hypoglycemics, HTN, HLD, GERD, depression, anxiety, schizophrenia, OA of right hip-for hip replacement in the near future who presents with c/o right renal stone. Denies any abdominal or flank pain, nausea, vomiting, hematuria, dysuria. Pt for right extracorporeal shockwave lithotripsy on 4/08/2024.

## 2024-04-01 NOTE — H&P PST ADULT - ASSESSMENT
This is a 58 yr old  male with PMH of severe SALLIE on CPAP, interstitial lung disease, Diabetes-last HgA1C 8.0 as per pt, on Trulicity, Lantus and oral Hypoglycemics, HTN, HLD, GERD, depression, anxiety, schizophrenia, OA of right hip-for hip replacement in the near future who presents with calculus of kidney. Pt for right extracorporeal shockwave lithotripsy on 4/08/2024.

## 2024-04-01 NOTE — H&P PST ADULT - NEGATIVE PSYCHIATRIC SYMPTOMS
no suicidal ideation/no paranoia/no mood swings/no agitation/no visual hallucinations/no auditory hallucinations/no hyperactivity

## 2024-04-01 NOTE — H&P PST ADULT - PROBLEM SELECTOR PLAN 7
Smoking cessation discussed with pt and encouraged to prevent effectiveness of antipsychotic meds.  Pt in pre-contemplating stage presently.

## 2024-04-01 NOTE — H&P PST ADULT - PROBLEM SELECTOR PLAN 11
Pt instructed to avoid NSAIDs-Motrin and Diclofenac 1 week before surgery.    Advised to take Tylenol as needed for pain.   Follow-up with PCP/provider for management.   Stated understanding of instructions given.

## 2024-04-01 NOTE — H&P PST ADULT - PROBLEM SELECTOR PLAN 5
Last HgA1C 8.0 as per pt.-will obtain recent value from PCP.  Instructed to continue antidiabetic meds and to hold Trulicity 1 week before surgery, Metformin, Actos and Prandin the morning of surgery.   Perioperative glucose monitoring and coverage as needed.   Follow-up with PCP for diabetic management.

## 2024-04-02 PROCEDURE — G0463: CPT

## 2024-04-02 PROCEDURE — 81001 URINALYSIS AUTO W/SCOPE: CPT

## 2024-04-02 PROCEDURE — 87077 CULTURE AEROBIC IDENTIFY: CPT

## 2024-04-02 PROCEDURE — 87086 URINE CULTURE/COLONY COUNT: CPT

## 2024-04-03 ENCOUNTER — APPOINTMENT (OUTPATIENT)
Dept: PULMONOLOGY | Facility: CLINIC | Age: 59
End: 2024-04-03
Payer: MEDICARE

## 2024-04-03 VITALS
HEART RATE: 79 BPM | SYSTOLIC BLOOD PRESSURE: 128 MMHG | BODY MASS INDEX: 37.33 KG/M2 | OXYGEN SATURATION: 94 % | HEIGHT: 69 IN | DIASTOLIC BLOOD PRESSURE: 80 MMHG | WEIGHT: 252 LBS

## 2024-04-03 DIAGNOSIS — G47.33 OBSTRUCTIVE SLEEP APNEA (ADULT) (PEDIATRIC): ICD-10-CM

## 2024-04-03 LAB
CULTURE RESULTS: ABNORMAL
CULTURE RESULTS: ABNORMAL
SPECIMEN SOURCE: SIGNIFICANT CHANGE UP

## 2024-04-03 PROCEDURE — 99214 OFFICE O/P EST MOD 30 MIN: CPT

## 2024-04-03 NOTE — REVIEW OF SYSTEMS
[EDS: ESS=____] : daytime somnolence: ESS=[unfilled] [Snoring] : snoring [Shortness Of Breath] : shortness of breath [Obesity] : obesity [Nocturia] : nocturia [Difficulty Initiating Sleep] : difficulty falling asleep [Negative] : Psychiatric [Difficulty Maintaining Sleep] : no difficulty maintaining sleep [Lower Extremity Discomfort] : no lower extremity discomfort [Irresistible urge to move legs] : no irresistible urge to move legs because of lower extremity discomfort [Late day/ Evening symptoms] : no late day/evening symptoms [Sleep Disturbances due to LE symptoms] : ~T no sleep disturbances due to lower extremity symptoms [Unusual Sleep Behavior] : no unusual sleep behavior [Cataplexy] :  no cataplexy [Hypersomnolence] : not sleeping much more than usual [Hypnopompic Hallucinations] : no hypnopompic hallucinations [Hypnogogic Hallucinations] : no hypnogogic hallucinations [Sleep Paralysis] : no sleep paralysis

## 2024-04-03 NOTE — HISTORY OF PRESENT ILLNESS
[Snoring] : snoring [Obstructive Sleep Apnea] : obstructive sleep apnea [Witnessed Apneas] : witnessed sleep apnea [Frequent Nocturnal Awakening] : frequent nocturnal awakening [Awakes Unrefreshed] : awakening unrefreshed [Awakening With Dry Mouth] : awakening with dry mouth [Recent  Weight Gain] : recent weight gain [DIS] : DIS [Hypersomnolence] : hypersomnolence [To Bed: ___] : ~he/she~ goes to bed at [unfilled] [Arises: ___] : arises at [unfilled] [Nocturnal Awakenings: ___] : ~he/she~ typically has [unfilled] nocturnal awakenings [Sleep Onset Latency: ___ minutes] : sleep onset latency of [unfilled] minutes reported [AHI: ___ per hour] : Apnea-hypopnea index:  [unfilled] per hour [T90%: ___] : T90%: [unfilled]% [Robb desatuation%: ___] : Robb desaturation:  [unfilled]% [Date: ___] : the most recent therapeutic polysomnogram was completed [unfilled] [CPAP: ___ cmH2O] : CPAP: [unfilled] cmH2O [Unintentional Sleep while Active] : no unintentional sleep while active [Awakes with Headache] : no headache upon awakening [Unintentional Sleep While Inactive] : no unintentional sleep while inactive [Unusual Sleep Behavior] : no unusual sleep behavior [DMS] : no DMS [Cataplexy] : no cataplexy [Sleep Paralysis] : no sleep paralysis [Hypnopompic Hallucinations] : no hallucinations when awakening [Hypnagogic Hallucinations] : no hallucinations when falling asleep [ESS] : 17 [FreeTextEntry1] : F&P Jaswinder FFM, Medium

## 2024-04-03 NOTE — PHYSICAL EXAM
[General Appearance - Well Developed] : well developed [Normal Appearance] : normal appearance [General Appearance - Well Nourished] : well nourished [Normal Oropharynx] : normal oropharynx [Apical Impulse] : the apical impulse was normal [II] : II [Heart Rate And Rhythm] : heart rate was normal and rhythm regular [Heart Sounds] : normal S1 and S2 [Respiration, Rhythm And Depth] : normal respiratory rhythm and effort [Abnormal Walk] : normal gait [Auscultation Breath Sounds / Voice Sounds] : lungs were clear to auscultation bilaterally [Cyanosis, Localized] : no localized cyanosis [Petechial Hemorrhages (___cm)] : no petechial hemorrhages [No Focal Deficits] : no focal deficits [Impaired Insight] : insight and judgment were intact [Oriented To Time, Place, And Person] : oriented to person, place, and time [Neck Appearance] : the appearance of the neck was normal [Affect] : the affect was normal [Neck Cervical Mass (___cm)] : no neck mass was observed [] : the neck was supple [FreeTextEntry1] : +clubbing

## 2024-04-03 NOTE — ASSESSMENT
[FreeTextEntry1] : This is a 58 year old male with a past medical history of severe SALLIE, cough, and tobacco use who comes in for a follow up:  1. Sleep Apnea: Mr. Moran has severe sleep apnea with associated oxygen desaturation and is experiencing morning headaches attributed to CPAP use. He has discontinued use of the CPAP machine due to these headaches. The patient has not yet tried all available adjustments to the CPAP settings and mask to alleviate these symptoms.  - Schedule a meeting with the sleep technologist to fit a new CPAP mask and adjust machine settings. - Temporarily discontinue CPAP use until the patient has been fitted with a new mask and the machine settings have been adjusted. - Monitor for improvement in morning headaches after adjustments to CPAP therapy. - May need to decrease pressure from his CPAP machine in order to alleviate headaches  The patient will follow up in the next couple of weeks for a CPAP mask fitting and machine adjustment with the sleep technologist. A subsequent appointment will be scheduled to assess compliance and headache improvement. The patient will also undergo a procedure for his kidney stone on Monday and may require additional follow-up for this condition.

## 2024-04-07 ENCOUNTER — TRANSCRIPTION ENCOUNTER (OUTPATIENT)
Age: 59
End: 2024-04-07

## 2024-04-08 ENCOUNTER — TRANSCRIPTION ENCOUNTER (OUTPATIENT)
Age: 59
End: 2024-04-08

## 2024-04-08 ENCOUNTER — INPATIENT (INPATIENT)
Facility: HOSPITAL | Age: 59
LOS: 0 days | Discharge: LEFT BEFORE DISCHARGE | DRG: 694 | End: 2024-04-09
Attending: UROLOGY | Admitting: UROLOGY
Payer: MEDICARE

## 2024-04-08 VITALS
HEART RATE: 95 BPM | OXYGEN SATURATION: 96 % | RESPIRATION RATE: 16 BRPM | DIASTOLIC BLOOD PRESSURE: 76 MMHG | HEIGHT: 69 IN | WEIGHT: 246.92 LBS | SYSTOLIC BLOOD PRESSURE: 134 MMHG | TEMPERATURE: 99 F

## 2024-04-08 VITALS
HEART RATE: 68 BPM | TEMPERATURE: 98 F | SYSTOLIC BLOOD PRESSURE: 169 MMHG | OXYGEN SATURATION: 98 % | DIASTOLIC BLOOD PRESSURE: 84 MMHG | RESPIRATION RATE: 18 BRPM

## 2024-04-08 DIAGNOSIS — Z98.890 OTHER SPECIFIED POSTPROCEDURAL STATES: Chronic | ICD-10-CM

## 2024-04-08 DIAGNOSIS — N20.0 CALCULUS OF KIDNEY: ICD-10-CM

## 2024-04-08 DIAGNOSIS — N28.89 OTHER SPECIFIED DISORDERS OF KIDNEY AND URETER: Chronic | ICD-10-CM

## 2024-04-08 DIAGNOSIS — Z01.818 ENCOUNTER FOR OTHER PREPROCEDURAL EXAMINATION: ICD-10-CM

## 2024-04-08 LAB
GLUCOSE BLDC GLUCOMTR-MCNC: 156 MG/DL — HIGH (ref 70–99)
GLUCOSE BLDC GLUCOMTR-MCNC: 211 MG/DL — HIGH (ref 70–99)

## 2024-04-08 PROCEDURE — 82962 GLUCOSE BLOOD TEST: CPT

## 2024-04-08 RX ORDER — HYDROXYZINE HCL 10 MG
1 TABLET ORAL
Refills: 0 | DISCHARGE

## 2024-04-08 RX ORDER — UMECLIDINIUM BROMIDE AND VILANTEROL TRIFENATATE 62.5; 25 UG/1; UG/1
1 POWDER RESPIRATORY (INHALATION)
Qty: 0 | Refills: 0 | DISCHARGE

## 2024-04-08 RX ORDER — SODIUM CHLORIDE 9 MG/ML
1000 INJECTION, SOLUTION INTRAVENOUS
Refills: 0 | Status: DISCONTINUED | OUTPATIENT
Start: 2024-04-08 | End: 2024-04-08

## 2024-04-08 RX ORDER — ALBUTEROL 90 UG/1
2 AEROSOL, METERED ORAL
Refills: 0 | DISCHARGE

## 2024-04-08 RX ORDER — METFORMIN HYDROCHLORIDE 850 MG/1
0 TABLET ORAL
Qty: 0 | Refills: 0 | DISCHARGE

## 2024-04-08 RX ORDER — FAMOTIDINE 10 MG/ML
1 INJECTION INTRAVENOUS
Refills: 0 | DISCHARGE

## 2024-04-08 RX ORDER — IBUPROFEN 200 MG
1 TABLET ORAL
Refills: 0 | DISCHARGE

## 2024-04-08 RX ORDER — ACETAMINOPHEN 500 MG
2 TABLET ORAL
Refills: 0 | DISCHARGE

## 2024-04-08 RX ORDER — ARIPIPRAZOLE 15 MG/1
1 TABLET ORAL
Refills: 0 | DISCHARGE

## 2024-04-08 RX ORDER — HALOPERIDOL DECANOATE 100 MG/ML
1 INJECTION INTRAMUSCULAR
Refills: 0 | DISCHARGE

## 2024-04-08 RX ORDER — OMEPRAZOLE 10 MG/1
1 CAPSULE, DELAYED RELEASE ORAL
Refills: 0 | DISCHARGE

## 2024-04-08 RX ORDER — BUPROPION HYDROCHLORIDE 150 MG/1
1 TABLET, EXTENDED RELEASE ORAL
Refills: 0 | DISCHARGE

## 2024-04-08 RX ORDER — ONDANSETRON 8 MG/1
4 TABLET, FILM COATED ORAL ONCE
Refills: 0 | Status: DISCONTINUED | OUTPATIENT
Start: 2024-04-08 | End: 2024-04-08

## 2024-04-08 RX ORDER — HYDROCHLOROTHIAZIDE 25 MG
1 TABLET ORAL
Qty: 0 | Refills: 0 | DISCHARGE

## 2024-04-08 RX ORDER — DULAGLUTIDE 4.5 MG/.5ML
0.75 INJECTION, SOLUTION SUBCUTANEOUS
Refills: 0 | DISCHARGE

## 2024-04-08 RX ORDER — METOPROLOL TARTRATE 50 MG
1 TABLET ORAL
Refills: 0 | DISCHARGE

## 2024-04-08 RX ORDER — ROSUVASTATIN CALCIUM 5 MG/1
1 TABLET ORAL
Qty: 0 | Refills: 0 | DISCHARGE

## 2024-04-08 RX ORDER — FENTANYL CITRATE 50 UG/ML
25 INJECTION INTRAVENOUS
Refills: 0 | Status: DISCONTINUED | OUTPATIENT
Start: 2024-04-08 | End: 2024-04-08

## 2024-04-08 RX ORDER — REPAGLINIDE 1 MG/1
1 TABLET ORAL
Refills: 0 | DISCHARGE

## 2024-04-08 RX ORDER — TRAZODONE HCL 50 MG
1 TABLET ORAL
Refills: 0 | DISCHARGE

## 2024-04-08 RX ORDER — DICLOFENAC SODIUM 75 MG/1
1 TABLET, DELAYED RELEASE ORAL
Refills: 0 | DISCHARGE

## 2024-04-08 RX ORDER — INSULIN GLARGINE 100 [IU]/ML
22 INJECTION, SOLUTION SUBCUTANEOUS
Refills: 0 | DISCHARGE

## 2024-04-08 NOTE — ASU PATIENT PROFILE, ADULT - NSICDXPASTMEDICALHX_GEN_ALL_CORE_FT
PAST MEDICAL HISTORY:  Diabetes     Hyperlipidemia     Hypertension     ILD (interstitial lung disease)     Migraines     Nephrolithiasis     OA (osteoarthritis)     SALLIE on CPAP     Renal infection     Renal stones     Tear of meniscus of knee

## 2024-04-08 NOTE — ASU DISCHARGE PLAN (ADULT/PEDIATRIC) - ASU DC SPECIAL INSTRUCTIONSFT
Please follow-up with your surgeon in 2 weeks. Drink plenty of fluids and rest as needed. Call for any fever over 101, nausea, vomiting, severe pain, no passing of gas or bowel movement.   You may take Tylenol for pain control. Strain your urine.   DIET   You may resume your regular diet as normal.

## 2024-04-08 NOTE — ASU DISCHARGE PLAN (ADULT/PEDIATRIC) - CARE PROVIDER_API CALL
Danny Martines  Urology  [address], [address]  [place] [address]  Phone: [phone]  Fax: [phone]  Follow Up Time: 2 weeks

## 2024-04-08 NOTE — ASU PATIENT PROFILE, ADULT - TOBACCO USE
[facility] outreach for diabetes.   A1c order placed, pt agreeable confirming she will have it done upcoming Monday 04/10/2023.    Current some day smoker Former smoker

## 2024-04-08 NOTE — ASU DISCHARGE PLAN (ADULT/PEDIATRIC) - NS MD DC FALL RISK RISK
For information on Fall & Injury Prevention, visit: [email or web address] OR  [email or web address] OR  https://www.cdc.gov/steadi/patient.html

## 2024-04-15 PROBLEM — J84.9 INTERSTITIAL PULMONARY DISEASE, UNSPECIFIED: Chronic | Status: ACTIVE | Noted: 2024-04-01

## 2024-04-15 PROBLEM — G47.33 OBSTRUCTIVE SLEEP APNEA (ADULT) (PEDIATRIC): Chronic | Status: ACTIVE | Noted: 2024-04-01

## 2024-04-24 ENCOUNTER — APPOINTMENT (OUTPATIENT)
Dept: PULMONOLOGY | Facility: CLINIC | Age: 59
End: 2024-04-24
Payer: MEDICARE

## 2024-04-24 PROCEDURE — 94660 CPAP INITIATION&MGMT: CPT

## 2024-04-24 NOTE — PROCEDURE
[FreeTextEntry1] : Maskfitting Patient is currently using a Jaswinder full face mask. Patient was fit with a [employer] Eson nasal mask, size Large.  RAFAT KANG was comfortable with the fit. Patient will try this mask and follow up with us within 2 weeks.  His machine settings were also changed from 4-20 cmH2O to 8-16 cmH2O. SN: [id number]

## 2024-04-26 NOTE — ASU PREOP CHECKLIST - PATIENT PROBLEMS/NEEDS
Pt pharmacy is calling the office for clarification on her lisinopril 20 mg and amlodipine 10 mg should the pt be taken both medication, pharmacy also want to know about pt levothyroxine was it discontinue. Thanks please advise   Patient expressed no known problems or needs

## 2024-05-22 ENCOUNTER — APPOINTMENT (OUTPATIENT)
Dept: ENDOCRINOLOGY | Facility: CLINIC | Age: 59
End: 2024-05-22
Payer: MEDICARE

## 2024-05-22 VITALS
TEMPERATURE: 98.4 F | OXYGEN SATURATION: 95 % | HEIGHT: 69 IN | HEART RATE: 78 BPM | WEIGHT: 250 LBS | SYSTOLIC BLOOD PRESSURE: 136 MMHG | BODY MASS INDEX: 37.03 KG/M2 | DIASTOLIC BLOOD PRESSURE: 80 MMHG

## 2024-05-22 DIAGNOSIS — Z79.4 TYPE 2 DIABETES MELLITUS W/OUT COMPLICATIONS: ICD-10-CM

## 2024-05-22 DIAGNOSIS — E66.9 OBESITY, UNSPECIFIED: ICD-10-CM

## 2024-05-22 DIAGNOSIS — E11.43 TYPE 2 DIABETES MELLITUS WITH DIABETIC AUTONOMIC (POLY)NEUROPATHY: ICD-10-CM

## 2024-05-22 DIAGNOSIS — E11.9 TYPE 2 DIABETES MELLITUS W/OUT COMPLICATIONS: ICD-10-CM

## 2024-05-22 LAB — GLUCOSE BLDC GLUCOMTR-MCNC: 230

## 2024-05-22 PROCEDURE — 82962 GLUCOSE BLOOD TEST: CPT

## 2024-05-22 PROCEDURE — 99214 OFFICE O/P EST MOD 30 MIN: CPT | Mod: 25

## 2024-05-22 RX ORDER — DULAGLUTIDE 1.5 MG/.5ML
1.5 INJECTION, SOLUTION SUBCUTANEOUS
Qty: 3 | Refills: 1 | Status: ACTIVE | COMMUNITY
Start: 2024-03-06 | End: 1900-01-01

## 2024-05-22 RX ORDER — PEN NEEDLE, DIABETIC 29 G X1/2"
32G X 4 MM NEEDLE, DISPOSABLE MISCELLANEOUS
Qty: 1 | Refills: 2 | Status: ACTIVE | COMMUNITY
Start: 2024-03-06 | End: 1900-01-01

## 2024-05-22 RX ORDER — BLOOD-GLUCOSE SENSOR
EACH MISCELLANEOUS
Qty: 6 | Refills: 2 | Status: ACTIVE | COMMUNITY
Start: 2024-03-06 | End: 1900-01-01

## 2024-05-22 RX ORDER — BLOOD-GLUCOSE METER
70 EACH MISCELLANEOUS
Qty: 3 | Refills: 3 | Status: ACTIVE | COMMUNITY
Start: 2024-03-06 | End: 1900-01-01

## 2024-05-22 RX ORDER — PIOGLITAZONE HYDROCHLORIDE 45 MG/1
45 TABLET ORAL DAILY
Qty: 90 | Refills: 1 | Status: ACTIVE | COMMUNITY
Start: 2024-03-06 | End: 1900-01-01

## 2024-05-22 RX ORDER — METFORMIN ER 500 MG 500 MG/1
500 TABLET ORAL
Qty: 180 | Refills: 2 | Status: ACTIVE | COMMUNITY
Start: 2024-03-06 | End: 1900-01-01

## 2024-05-22 RX ORDER — GLUCOSAM/CHON-MSM1/C/MANG/BOSW 500-416.6
TABLET ORAL
Qty: 1 | Refills: 0 | Status: ACTIVE | COMMUNITY
Start: 2024-03-06 | End: 1900-01-01

## 2024-05-22 RX ORDER — BLOOD SUGAR DIAGNOSTIC
STRIP MISCELLANEOUS TWICE DAILY
Qty: 180 | Refills: 2 | Status: ACTIVE | COMMUNITY
Start: 2024-03-06 | End: 1900-01-01

## 2024-05-22 RX ORDER — LANCETS 33 GAUGE
EACH MISCELLANEOUS
Qty: 180 | Refills: 2 | Status: ACTIVE | COMMUNITY
Start: 2024-03-06 | End: 1900-01-01

## 2024-05-22 RX ORDER — INSULIN GLARGINE 100 [IU]/ML
100 INJECTION, SOLUTION SUBCUTANEOUS DAILY
Qty: 10 | Refills: 1 | Status: ACTIVE | COMMUNITY
Start: 2024-03-06 | End: 1900-01-01

## 2024-05-22 NOTE — HISTORY OF PRESENT ILLNESS
[FreeTextEntry1] : 58 yearM here for f/up for Type 2 diabetes mellitus   last A1c: 10.8%   Patient with past medical history as below,     Screening Ophthalmology: fdue for visit Microalbumin: Cr: on cozaar EGFR: 41 LDL: on statin     Current diabetic medication regimen (verified with patient):  Lantus 22 units  Qhs trulicity 0.75mg Q weekly metformin ER 500mg po bid actos 45mg        SMBG ranges (glucometer):  am: 140-160mg/dl PP: low 200's   CGM placed last visit, however patient did not  from pharmacy

## 2024-05-24 ENCOUNTER — OUTPATIENT (OUTPATIENT)
Dept: OUTPATIENT SERVICES | Facility: HOSPITAL | Age: 59
LOS: 1 days | End: 2024-05-24
Payer: COMMERCIAL

## 2024-05-24 ENCOUNTER — APPOINTMENT (OUTPATIENT)
Dept: PHYSICAL MEDICINE AND REHAB | Facility: CLINIC | Age: 59
End: 2024-05-24
Payer: MEDICARE

## 2024-05-24 DIAGNOSIS — N28.89 OTHER SPECIFIED DISORDERS OF KIDNEY AND URETER: Chronic | ICD-10-CM

## 2024-05-24 DIAGNOSIS — Z98.890 OTHER SPECIFIED POSTPROCEDURAL STATES: Chronic | ICD-10-CM

## 2024-05-24 DIAGNOSIS — M25.551 PAIN IN RIGHT HIP: ICD-10-CM

## 2024-05-24 PROCEDURE — 77002 NEEDLE LOCALIZATION BY XRAY: CPT | Mod: 26

## 2024-05-24 PROCEDURE — 20610 DRAIN/INJ JOINT/BURSA W/O US: CPT

## 2024-05-24 PROCEDURE — 77002 NEEDLE LOCALIZATION BY XRAY: CPT

## 2024-05-24 NOTE — PROCEDURE
[de-identified] : [facility] [address] [place] [address], [address] - [phone]   PATIENT: RAFAT KANG MEDICAL RECORD: [id number] DATE OF PROCEDURE: 05/24/2024   PHYSICIAN: Zane Walter DO  RIGHT HIP INTRAARTICULAR JOINT INJECTION WITH FLUOROSCOPIC GUIDANCE  Injectate: 40mg Triamcinolone and 0.25% Bupivicaine 4mL to a total of 5mL  Complications: None  Estimated Blood Loss: None  Technique: After informed consent was obtained, the patient was taken to the operating room and placed on the table in the supine position. All pressure points were supported and this procedure was done under local anesthesia.  The patient's right hip was prepped with chlorhexidine and draped in standard sterile manner. The entirety of the procedure was done under sterile technique using sterile gloves, surgical mask and cap and all medication expiration dates were verified prior to being drawn into syringes.  Utilizing AP fluoroscopy the right hip joint was visualized. Target sites was the right intraarticular joint space. Skin wheal was created at the target site using a 25G 1-1/2 inch needle and 3 mL of preservative free 1% lidocaine. Following this, 3-1/2-inch 25G spinal needle was inserted until bony contact at the femoral neck.  After negative aspiration for heme the above mentioned injectate was easily injected.   The patient tolerated the procedure well and was taken to phase II recovery in stable condition with bilateral lower extremity motor and sensory intact.

## 2024-06-10 ENCOUNTER — APPOINTMENT (OUTPATIENT)
Dept: PHYSICAL MEDICINE AND REHAB | Facility: CLINIC | Age: 59
End: 2024-06-10

## 2024-06-10 NOTE — HISTORY OF PRESENT ILLNESS
[FreeTextEntry1] : RAFAT KANG is here for follow up. Since last visit   Denies any new pain, numbness or weakness, bowel/bladder dysfunction, saddle anesthesia, fevers, chills, weight loss, night pain, or night sweats at this time.

## 2024-06-24 ENCOUNTER — APPOINTMENT (OUTPATIENT)
Dept: PHYSICAL MEDICINE AND REHAB | Facility: CLINIC | Age: 59
End: 2024-06-24
Payer: MEDICARE

## 2024-06-24 DIAGNOSIS — M25.551 PAIN IN RIGHT HIP: ICD-10-CM

## 2024-06-24 PROCEDURE — 99213 OFFICE O/P EST LOW 20 MIN: CPT

## 2024-06-24 RX ORDER — DICLOFENAC SODIUM 50 MG/1
50 TABLET, DELAYED RELEASE ORAL
Qty: 60 | Refills: 1 | Status: ACTIVE | COMMUNITY
Start: 2021-09-01 | End: 1900-01-01

## 2024-07-19 ENCOUNTER — APPOINTMENT (OUTPATIENT)
Dept: ORTHOPEDIC SURGERY | Facility: CLINIC | Age: 59
End: 2024-07-19

## 2024-07-25 ENCOUNTER — APPOINTMENT (OUTPATIENT)
Dept: PULMONOLOGY | Facility: CLINIC | Age: 59
End: 2024-07-25
Payer: MEDICARE

## 2024-07-25 VITALS
TEMPERATURE: 98.2 F | HEIGHT: 69 IN | HEART RATE: 69 BPM | SYSTOLIC BLOOD PRESSURE: 147 MMHG | BODY MASS INDEX: 37.03 KG/M2 | WEIGHT: 250 LBS | RESPIRATION RATE: 15 BRPM | OXYGEN SATURATION: 93 % | DIASTOLIC BLOOD PRESSURE: 88 MMHG

## 2024-07-25 DIAGNOSIS — Z87.891 PERSONAL HISTORY OF NICOTINE DEPENDENCE: ICD-10-CM

## 2024-07-25 DIAGNOSIS — R06.02 SHORTNESS OF BREATH: ICD-10-CM

## 2024-07-25 DIAGNOSIS — R05.9 COUGH, UNSPECIFIED: ICD-10-CM

## 2024-07-25 PROCEDURE — 99214 OFFICE O/P EST MOD 30 MIN: CPT

## 2024-07-25 RX ORDER — IPRATROPIUM BROMIDE AND ALBUTEROL 20; 100 UG/1; UG/1
20-100 SPRAY, METERED RESPIRATORY (INHALATION)
Qty: 1 | Refills: 4 | Status: ACTIVE | COMMUNITY
Start: 2024-07-25 | End: 1900-01-01

## 2024-07-25 NOTE — HISTORY OF PRESENT ILLNESS
[>= 20 pack years] : >= 20 pack years [Former] : former [TextBox_4] : 59YO Male active smoker PMH Bipolar, HLD, HTN, DJD of bilateral hips, DMII, Renal cell carcinoma (tx surgery resection of left kidney) and rhinitis who presents for followup.  Initial visit in 2/2023 was for cough and shortness of breath. Anoro has improved his sx, but he reports it leaves a bad taste in his mouth.  Today reports headache when using Anoro.  Also with SALLIE on CPAP.   Pt did not desaturate on ambulatory pulse ox in office  PFTs within normal limits CT screening 3/9/23 noted a 1mm nodule in RUL.  Last seen in the office 11/2023 Having hip replacement surgery in september.  Since he stopped smoking he has gained weight.  He weighs 250lbs now. State he was supposed lung cancer screening ct 3/30/24 and was never called.  He reports cough productive of green sputum.  Currently on Cipro for a uti which has helped his cough.  [TextBox_11] : 0.5 [TextBox_13] : 43 [YearQuit] : 2024

## 2024-07-25 NOTE — REVIEW OF SYSTEMS
[Cough] : cough [Sputum] : sputum [SOB on Exertion] : sob on exertion [Arthralgias] : arthralgias [Negative] : Gastrointestinal

## 2024-07-25 NOTE — ASSESSMENT
[FreeTextEntry1] : 59YO Male former smoker PMH Bipolar, HLD, HTN, DJD of bilateral hips, DMII, Renal cell carcinoma (tx surgery resection of left kidney) and rhinitis who presents for followup.  He has normal PFTs but reports exertional dyspnea.. Anoro has improved his sx, but he previously reported it leaves a bad taste in his mouth and today reports Headache when he takes it.. Also with SALLIE, being treated with CPAP and managed by Dr. Hathaway. He recently stopped smoking and had an episode of bronchitis and UTI.  He is on Cipro and improving.  He will have a hip replacement in the fall and thinks he will need clearance.   ON PE today lungs are clear VSS. PFTs within normal limits  Plan: 1- LUng cancer screening CT ordered for patient today. Given copy of the requisition. 2- as he feels better with anoro  will try combivent respimat as needed for shortness of breath 3- From pulmonary standpoint there is no contraindication to proceeding with hip replacement.  He will need to see Dr. Hathaway to obtain clearance from the standpoint of his sleep apnea. F/U in 6-12 months.

## 2024-07-25 NOTE — ASSESSMENT
[FreeTextEntry1] : 57YO Male former smoker PMH Bipolar, HLD, HTN, DJD of bilateral hips, DMII, Renal cell carcinoma (tx surgery resection of left kidney) and rhinitis who presents for followup.  He has normal PFTs but reports exertional dyspnea.. Anoro has improved his sx, but he previously reported it leaves a bad taste in his mouth and today reports Headache when he takes it.. Also with SALLIE, being treated with CPAP and managed by Dr. Hathaway. He recently stopped smoking and had an episode of bronchitis and UTI.  He is on Cipro and improving.  He will have a hip replacement in the fall and thinks he will need clearance.   ON PE today lungs are clear VSS. PFTs within normal limits  Plan: 1- LUng cancer screening CT ordered for patient today. Given copy of the requisition. 2- as he feels better with anoro  will try combivent respimat as needed for shortness of breath 3- From pulmonary standpoint there is no contraindication to proceeding with hip replacement.  He will need to see Dr. Hathaway to obtain clearance from the standpoint of his sleep apnea. F/U in 6-12 months.

## 2024-07-29 ENCOUNTER — APPOINTMENT (OUTPATIENT)
Dept: ORTHOPEDIC SURGERY | Facility: CLINIC | Age: 59
End: 2024-07-29

## 2024-07-29 DIAGNOSIS — M16.11 UNILATERAL PRIMARY OSTEOARTHRITIS, RIGHT HIP: ICD-10-CM

## 2024-07-29 PROCEDURE — 99213 OFFICE O/P EST LOW 20 MIN: CPT

## 2024-07-30 ENCOUNTER — APPOINTMENT (OUTPATIENT)
Dept: DERMATOLOGY | Facility: CLINIC | Age: 59
End: 2024-07-30
Payer: MEDICARE

## 2024-07-30 DIAGNOSIS — Z12.83 ENCOUNTER FOR SCREENING FOR MALIGNANT NEOPLASM OF SKIN: ICD-10-CM

## 2024-07-30 DIAGNOSIS — L91.8 OTHER HYPERTROPHIC DISORDERS OF THE SKIN: ICD-10-CM

## 2024-07-30 DIAGNOSIS — Z87.891 PERSONAL HISTORY OF NICOTINE DEPENDENCE: ICD-10-CM

## 2024-07-30 DIAGNOSIS — D18.01 HEMANGIOMA OF SKIN AND SUBCUTANEOUS TISSUE: ICD-10-CM

## 2024-07-30 PROCEDURE — 99203 OFFICE O/P NEW LOW 30 MIN: CPT

## 2024-08-05 ENCOUNTER — NON-APPOINTMENT (OUTPATIENT)
Age: 59
End: 2024-08-05

## 2024-08-05 PROBLEM — Z87.891 FORMER SMOKER: Status: ACTIVE | Noted: 2024-08-05

## 2024-08-05 NOTE — VITALS
[TextEntry] : Height and weight obtained from last documented vitals: Height:  5 foot 9 in  Weight:  240

## 2024-08-05 NOTE — HISTORY OF PRESENT ILLNESS
[Former] : Former [TextBox_13] :  Patient is scheduled for a nnual LDCT for lung cancer screening. Shared decision making managed and documented by Delma Matos Multiple attempts to reach the patient were unsuccessful. Chart review performed to confirm eligibility for LDCT.  No documented personal or family history of lung cancer. No documented s/s of lung cancer. Documented PMH: lung nodules [YearQuit] : 2024 [PacksperYear] : 32

## 2024-08-05 NOTE — PLAN
[FreeTextEntry1] : Plan: -Patient is eligible for LDCT based on documented information obtained from chart review -Low Dose CT chest for lung cancer screening to be completed  -Patient to follow up with Delma Matos

## 2024-08-06 ENCOUNTER — APPOINTMENT (OUTPATIENT)
Dept: PHYSICAL MEDICINE AND REHAB | Facility: CLINIC | Age: 59
End: 2024-08-06

## 2024-08-06 PROCEDURE — 99213 OFFICE O/P EST LOW 20 MIN: CPT

## 2024-08-06 NOTE — ASSESSMENT
[FreeTextEntry1] : #FBSE/Healthcare maintenance -Sun protection was discussed. The proper use of broad-spectrum UVA/UVB sunscreens with SPF 30 or greater was reviewed and the need for re-application after swimming or sweating or 2-3 hours was emphasized. We talked about judicious use of clothing and avoidance of peak periods of sun exposure. I made the patient aware of the need for year-round protection. ABCDEs of melanoma were also reviewed. -Self-skin checks were also reviewed, rtc sooner if changed noted -Counseled patient to monitor for changes - FBSE completed today, no lesions concerning for malignancy. Next FBSE due 1 year.   #Angiomas (benign noncancerous blood vessel growths)  - Clinically benign red or purple blood vessel growths  - No treatment warranted   # Acrochordon/Skin tag, b/l axillae  - Benign diagnosis  - No treatment necessary unless bothersome to patient

## 2024-08-06 NOTE — HISTORY OF PRESENT ILLNESS
[FreeTextEntry1] : NP- spots [de-identified] : Mr. Moran is a 58 year y/o M with DM II here for evaluation of below # FBSE - had a "black skin tag" that fell off a week ago - No other concerning lesions - no personal or fam hx of skin cancer   No other changing or concerning lesions. No itchy, growing, bleeding, painful, or changing moles.  Personal hx of skin cancer: no FHx of skin cancer: no

## 2024-08-06 NOTE — ASSESSMENT
[FreeTextEntry1] : Mr. RAFAT KANG is a 58 year M with pain in the right hip due to hip osteoarthritis relieved with steroid injections. His knee is doing well after RFA. However the hip pain is the most bothersome symptom.  Patient reassured and educated on the diagnosis and treatment options. Risks and benefits of treatment and of delaying treatment discussed with patient. Risks discussed include but not limited to: progression of symptoms, worsening pain and functional status, etc.  This note was generated using Dragon medical dictation software. A reasonable effort had been made for proofreading its contents, but spelling mistakes or grammatical errors may still remain. If there are any questions or points of clarification needed please notify my office.  Consider repeating RIGHT HIP INTRARTICULAR JOINT STEROID INJECTION WITH FLUOROSCOPIC GUIDANCE if pain is severe  PRN Diclofenac 50mg PO q12 x 30 days PRN pain with food #60. Denies CKD, CAD, or gastritis. Recommend that if patient develops GI symptoms including abdominal pain, nausea, or vomiting to discontinue use of medication immediately.  Follow up PRN  Patient was advised if the following symptoms develop: chills, fever, loss of bladder control, bowel incontinence or urinary retention, numbness/tingling or weakness is present in upper or lower extremities, to go to the nearest emergency room. This may be a new clinical condition not present at the time of the patient visit that may lead to paralysis and/or death. Patient advised if the above symptoms developed to also call the office immediately to inform us and to go to the nearest emergency room.

## 2024-08-06 NOTE — HISTORY OF PRESENT ILLNESS
[FreeTextEntry1] : NP- spots [de-identified] : Mr. Moran is a 58 year y/o M with DM II here for evaluation of below # FBSE - had a "black skin tag" that fell off a week ago - No other concerning lesions - no personal or fam hx of skin cancer   No other changing or concerning lesions. No itchy, growing, bleeding, painful, or changing moles.  Personal hx of skin cancer: no FHx of skin cancer: no

## 2024-08-06 NOTE — PHYSICAL EXAM
[FreeTextEntry3] : Full body skin exam was performed. The patient is well-appearing, in no acute distress, alert and oriented x 3. Mood and affect are normal. A complete cutaneous examination of the scalp, face, neck, chest, abdomen, back, bilateral arms, bilateral legs, buttocks, digits, nails, eyelids, conjunctiva and lips reveals the following significant findings: - pedunculated skin colored papules b/l axillae  - cherry red papules scattered on torso

## 2024-08-06 NOTE — HISTORY OF PRESENT ILLNESS
[FreeTextEntry1] : RAFAT KANG is here for follow up. Since last visit he had seen ortho joint Dr. Alvarez and was advised that he needs to be medically optimized before he can have surgery for his hip replacement. Pain is about 7/10 on NRS over the right hip. He takes Dilofenac PRN with partial relief.   From Dr. Alvarez's note on 7/29/24: "58M with severe right hip DJD, indicated for SHABANA, likely DAA, however he is no longer optimized due to the elevated A1c. I have recommended that he follow-up with his endocrinologist to work on improving this number and to maintain smoking cessation as he has done. Follow-up in 2 months for reassessment and preoperative planning."   Denies any new pain, numbness or weakness, bowel/bladder dysfunction, saddle anesthesia, fevers, chills, weight loss, night pain, or night sweats at this time.

## 2024-08-12 NOTE — ED PROVIDER NOTE - CARDIOVASCULAR NEGATIVE STATEMENT, MLM
[facility] Hospitalist Physician Discharge Summary       Gunner Espinosa MD  [address] [facility] [address]  [phone]    Call  hospital follow up    Nico Carrasco DO  [address] [facility] [address]  [phone]    Follow up        Activity level: As tolerated     Dispo: Home      Condition on discharge: Stable     Patient ID:  Chuck Chavarria Jr.  [id number]  74 y.o.  1950    Admit date: 8/10/2024    Discharge date and time:  8/12/2024  11:48 AM    Admission Diagnoses: Principal Problem:    Acute diastolic CHF (congestive heart failure) (HCC)  Resolved Problems:    * No resolved hospital problems. *      Discharge Diagnoses: Principal Problem:    Acute diastolic CHF (congestive heart failure) (HCC)  Resolved Problems:    * No resolved hospital problems. *      Consults:  IP CONSULT TO CARDIOLOGY    Hospital Course:   Patient Chuck Chavarria Jr. is a 74 y.o. presented with COPD with acute exacerbation (HCC) [J44.1]  Acute diastolic CHF (congestive heart failure) (HCC) [I50.31]  Acute respiratory failure, unspecified whether with hypoxia or hypercapnia (HCC) [J96.00]  Congestive heart failure, unspecified HF chronicity, unspecified heart failure type (HCC) [I50.9]    74 y.o. male who presented to [facility] with shortness of breath starting few hours before presentation.  Patient also reported productive cough with chest tightness by EMS and was tachypneic placed on oxygen due to hypoxia.In the ER he was placed on 3 L blood pressure initially was 190/110.  Given IV Solu-Medrol breathing treatment for COPD exacerbation.  Chest x-ray showed worsening patchy opacities concerning for pulmonary edema or infectious etiology cannot be ruled out.  proBNP of 10,317.  He was given Lasix 20 IV push. Troponin of 41 on presentation.  Blood gas with pH of 7.4 pCO2 of 30.3 given his tachypnea he was placed on BiPAP.  Patient reports has been coughing up some  no chest pain and no edema.

## 2024-08-15 ENCOUNTER — APPOINTMENT (OUTPATIENT)
Dept: PHYSICAL MEDICINE AND REHAB | Facility: CLINIC | Age: 59
End: 2024-08-15
Payer: MEDICARE

## 2024-08-15 DIAGNOSIS — M25.561 PAIN IN RIGHT KNEE: ICD-10-CM

## 2024-08-15 DIAGNOSIS — G89.29 PAIN IN RIGHT KNEE: ICD-10-CM

## 2024-08-15 DIAGNOSIS — M25.551 PAIN IN RIGHT HIP: ICD-10-CM

## 2024-08-15 DIAGNOSIS — Z98.890 OTHER SPECIFIED POSTPROCEDURAL STATES: ICD-10-CM

## 2024-08-15 DIAGNOSIS — M25.562 PAIN IN LEFT KNEE: ICD-10-CM

## 2024-08-15 PROCEDURE — 99213 OFFICE O/P EST LOW 20 MIN: CPT | Mod: 95

## 2024-08-15 RX ORDER — METHYLPREDNISOLONE 4 MG/1
4 TABLET ORAL
Qty: 1 | Refills: 0 | Status: ACTIVE | COMMUNITY
Start: 2024-08-15 | End: 1900-01-01

## 2024-08-15 NOTE — PHYSICAL EXAM
[FreeTextEntry1] : General exam   Constitutional: The patient appears well-developed, well-nourished, and in no apparent distress. Patient is well-groomed.    Skin: The skin is warm and dry, with normal turgor.  Eyes: PERRL.    ENMT: Ears: Hearing is grossly within normal limits.    Neck: Supple: The neck is supple.    Respiratory: Inspection: Breathing unlabored.    Neurologic: Alert and oriented x 3.   Psychiatric: Patient is cooperative and appropriate.  Mood and affect are normal.  Patient's insight is good, and memory and judgment are intact.  Left knee limited visual inspection via video Skin c/d/i without any erythema, swelling, effusion

## 2024-08-15 NOTE — HISTORY OF PRESENT ILLNESS
[FreeTextEntry1] : Televisit encounter Patient consented to be evaluated via televisit today using audio and video Present during today's encounter:  RAFAT KANG and myself Patient was located at home. I was located at: [facility] [address], [place] [address]  RAFAT KANG is here as an emergency add on. He reports hurting his LEFT knee 3 days ago. Pain is worse with walking. Denies falling or any particular injury. Pain is 7/10 on NRS in the knee.   Denies any new numbness or weakness, bowel/bladder dysfunction, saddle anesthesia, fevers, chills, weight loss, night pain, or night sweats at this time.

## 2024-08-15 NOTE — ASSESSMENT
[FreeTextEntry1] : Mr. RAFAT KANG is a 58 year M with pain in the right hip due to hip osteoarthritis, right knee with history of arthroscopy and s/p RFA. Now with new, acute injury of the LEFT knee, likely due to strain.  Patient reassured and educated on the diagnosis and treatment options. Risks and benefits of treatment and of delaying treatment discussed with patient. Risks discussed include but not limited to: progression of symptoms, worsening pain and functional status, etc.  This note was generated using Dragon medical dictation software. A reasonable effort had been made for proofreading its contents, but spelling mistakes or grammatical errors may still remain. If there are any questions or points of clarification needed please notify my office.  Consider repeating RIGHT HIP INTRARTICULAR JOINT STEROID INJECTION WITH FLUOROSCOPIC GUIDANCE if pain is severe in the future  Sending for XR of the LEFT knee to eval for injury RICE PRN  Start Medrol 4mg dose pack #21 tablets. Patient directed to follow directions on the blister pack and to complete the entire treatment course. Advised not to take any NSAIDs during of treatment. Denies CKD, CAD, or gastritis. Recommend that if patient develops GI symptoms including abdominal pain, nausea, or vomiting to discontinue use of medication immediately.  Follow up 2 weeks  Patient was advised if the following symptoms develop: chills, fever, loss of bladder control, bowel incontinence or urinary retention, numbness/tingling or weakness is present in upper or lower extremities, to go to the nearest emergency room. This may be a new clinical condition not present at the time of the patient visit that may lead to paralysis and/or death. Patient advised if the above symptoms developed to also call the office immediately to inform us and to go to the nearest emergency room.

## 2024-08-17 ENCOUNTER — APPOINTMENT (OUTPATIENT)
Dept: RADIOLOGY | Facility: CLINIC | Age: 59
End: 2024-08-17

## 2024-08-17 ENCOUNTER — RESULT REVIEW (OUTPATIENT)
Age: 59
End: 2024-08-17

## 2024-08-17 ENCOUNTER — APPOINTMENT (OUTPATIENT)
Dept: CT IMAGING | Facility: CLINIC | Age: 59
End: 2024-08-17

## 2024-08-28 ENCOUNTER — APPOINTMENT (OUTPATIENT)
Dept: PHYSICAL MEDICINE AND REHAB | Facility: CLINIC | Age: 59
End: 2024-08-28
Payer: MEDICARE

## 2024-08-28 DIAGNOSIS — M25.562 PAIN IN RIGHT KNEE: ICD-10-CM

## 2024-08-28 DIAGNOSIS — M25.552 PAIN IN RIGHT HIP: ICD-10-CM

## 2024-08-28 DIAGNOSIS — M25.561 PAIN IN RIGHT KNEE: ICD-10-CM

## 2024-08-28 DIAGNOSIS — M47.817 SPONDYLOSIS W/OUT MYELOPATHY OR RADICULOPATHY, LUMBOSACRAL REGION: ICD-10-CM

## 2024-08-28 DIAGNOSIS — M25.551 PAIN IN RIGHT HIP: ICD-10-CM

## 2024-08-28 DIAGNOSIS — M25.59 PAIN IN OTHER SPECIFIED JOINT: ICD-10-CM

## 2024-08-28 DIAGNOSIS — G89.29 PAIN IN RIGHT KNEE: ICD-10-CM

## 2024-08-28 PROCEDURE — 99214 OFFICE O/P EST MOD 30 MIN: CPT | Mod: 95

## 2024-08-28 RX ORDER — SULINDAC 150 MG/1
150 TABLET ORAL
Qty: 14 | Refills: 0 | Status: ACTIVE | COMMUNITY
Start: 2024-08-28 | End: 1900-01-01

## 2024-08-28 NOTE — HISTORY OF PRESENT ILLNESS
[FreeTextEntry1] : Televisit encounter Patient consented to be evaluated via televisit today using audio and video Present during today's encounter:  RAFAT KANG and myself Patient was located at home. I was located at: [address] [place] [address]  RAFAT KANG is here for follow up. Since last visit he had XR of the LEFT knee. Today he reports worsening pains in both knees, hips and lower back. He is concerned because pain is 10/10 on NRS and is making it difficult to get out of the house.   Denies any new pain, numbness or weakness, bowel/bladder dysfunction, saddle anesthesia, fevers, chills, weight loss, night pain, or night sweats at this time.

## 2024-08-28 NOTE — ASSESSMENT
[FreeTextEntry1] : Mr. RAFAT KANG is a 58 year M with pain in the right hip due to hip osteoarthritis, right knee with history of arthroscopy and s/p RFA. Now with new, acute injury of the LEFT knee and also LEFT hip and lower back. He appears to be having severe pains in multiple joints and lower back.  Patient reassured and educated on the diagnosis and treatment options. Risks and benefits of treatment and of delaying treatment discussed with patient. Risks discussed include but not limited to: progression of symptoms, worsening pain and functional status, etc.  This note was generated using Dragon medical dictation software. A reasonable effort had been made for proofreading its contents, but spelling mistakes or grammatical errors may still remain. If there are any questions or points of clarification needed please notify my office.  Consider repeating RIGHT HIP INTRARTICULAR JOINT STEROID INJECTION WITH FLUOROSCOPIC GUIDANCE if pain is severe in the future  XR of the LEFT knee with DJD that is worsened  Sending for XR of HIPS and lower back to al for injury and worsening DJD RICE PRN  Referring to rheumatology for pain in multiple joints  Emailed [facility] to get the next available appointment  Rx fore Famotidine PO PRN  Start Sulindac 150mg PO q12 x 7 days PRN pain with food #14. Denies CKD, CAD, or gastritis. Recommend that if patient develops GI symptoms including abdominal pain, nausea, or vomiting to discontinue use of medication immediately.  Follow up 4 weeks  Patient was advised if the following symptoms develop: chills, fever, loss of bladder control, bowel incontinence or urinary retention, numbness/tingling or weakness is present in upper or lower extremities, to go to the nearest emergency room. This may be a new clinical condition not present at the time of the patient visit that may lead to paralysis and/or death. Patient advised if the above symptoms developed to also call the office immediately to inform us and to go to the nearest emergency room.

## 2024-09-03 ENCOUNTER — RESULT REVIEW (OUTPATIENT)
Age: 59
End: 2024-09-03

## 2024-09-03 ENCOUNTER — APPOINTMENT (OUTPATIENT)
Dept: RADIOLOGY | Facility: CLINIC | Age: 59
End: 2024-09-03
Payer: MEDICARE

## 2024-09-03 ENCOUNTER — OUTPATIENT (OUTPATIENT)
Dept: OUTPATIENT SERVICES | Facility: HOSPITAL | Age: 59
LOS: 1 days | End: 2024-09-03
Payer: COMMERCIAL

## 2024-09-03 DIAGNOSIS — Z98.890 OTHER SPECIFIED POSTPROCEDURAL STATES: Chronic | ICD-10-CM

## 2024-09-03 DIAGNOSIS — M25.561 PAIN IN RIGHT KNEE: ICD-10-CM

## 2024-09-03 DIAGNOSIS — M25.59 PAIN IN OTHER SPECIFIED JOINT: ICD-10-CM

## 2024-09-03 DIAGNOSIS — M47.817 SPONDYLOSIS WITHOUT MYELOPATHY OR RADICULOPATHY, LUMBOSACRAL REGION: ICD-10-CM

## 2024-09-03 DIAGNOSIS — M25.551 PAIN IN RIGHT HIP: ICD-10-CM

## 2024-09-03 DIAGNOSIS — N28.89 OTHER SPECIFIED DISORDERS OF KIDNEY AND URETER: Chronic | ICD-10-CM

## 2024-09-03 PROCEDURE — 72100 X-RAY EXAM L-S SPINE 2/3 VWS: CPT

## 2024-09-03 PROCEDURE — 73521 X-RAY EXAM HIPS BI 2 VIEWS: CPT

## 2024-09-03 PROCEDURE — 72100 X-RAY EXAM L-S SPINE 2/3 VWS: CPT | Mod: 26

## 2024-09-03 PROCEDURE — 73521 X-RAY EXAM HIPS BI 2 VIEWS: CPT | Mod: 26

## 2024-09-06 ENCOUNTER — APPOINTMENT (OUTPATIENT)
Dept: PULMONOLOGY | Facility: CLINIC | Age: 59
End: 2024-09-06

## 2024-09-11 ENCOUNTER — APPOINTMENT (OUTPATIENT)
Dept: PHYSICAL MEDICINE AND REHAB | Facility: CLINIC | Age: 59
End: 2024-09-11
Payer: MEDICARE

## 2024-09-11 DIAGNOSIS — M25.561 PAIN IN RIGHT KNEE: ICD-10-CM

## 2024-09-11 DIAGNOSIS — M25.552 PAIN IN RIGHT HIP: ICD-10-CM

## 2024-09-11 DIAGNOSIS — M25.562 PAIN IN RIGHT KNEE: ICD-10-CM

## 2024-09-11 DIAGNOSIS — G89.29 PAIN IN RIGHT KNEE: ICD-10-CM

## 2024-09-11 DIAGNOSIS — M25.551 PAIN IN RIGHT HIP: ICD-10-CM

## 2024-09-11 PROCEDURE — 99213 OFFICE O/P EST LOW 20 MIN: CPT | Mod: 95

## 2024-09-11 RX ORDER — DICLOFENAC SODIUM 100 MG/1
100 TABLET, FILM COATED, EXTENDED RELEASE ORAL
Qty: 30 | Refills: 1 | Status: ACTIVE | COMMUNITY
Start: 2024-09-11 | End: 1900-01-01

## 2024-09-11 RX ORDER — METHOCARBAMOL 750 MG/1
750 TABLET, FILM COATED ORAL
Qty: 30 | Refills: 0 | Status: ACTIVE | COMMUNITY
Start: 2024-09-11 | End: 1900-01-01

## 2024-09-11 NOTE — HISTORY OF PRESENT ILLNESS
[FreeTextEntry1] : Televisit encounter Patient consented to be evaluated via televisit today using audio and video Present during today's encounter:  RAFAT KANG and myself Patient was located at home. I was located at: [address] [place] [address]  RAFAT KANG is here for follow up. Since last visit he had XR of both hips and also lower back. Notes worsening overall pain in the hips, LEFT > RIGHT. It wakes it up at night. He does report that Diclofenac helps for a few hours. He now has appt with Rheumatology and Orthopedics in November.   Denies any new pain, numbness or weakness, bowel/bladder dysfunction, saddle anesthesia, fevers, chills, weight loss or night sweats at this time.

## 2024-09-11 NOTE — PHYSICAL EXAM
[FreeTextEntry1] : General exam   Constitutional: The patient appears well-developed, well-nourished, and in no apparent distress. Patient is well-groomed.    Skin: The skin is warm and dry, with normal turgor.  Eyes: PERRL.    ENMT: Ears: Hearing is grossly within normal limits.    Neck: Supple: The neck is supple.    Respiratory: Inspection: Breathing unlabored.    Neurologic: Alert and oriented x 3.   Psychiatric: Patient is cooperative and appropriate.

## 2024-09-11 NOTE — ASSESSMENT
[FreeTextEntry1] : Mr. RAFAT KANG is a 59 year M with pain in the hips due to hip osteoarthritis and lower back pain due to DJD and joint space narrowing.  Patient reassured and educated on the diagnosis and treatment options. Risks and benefits of treatment and of delaying treatment discussed with patient. Risks discussed include but not limited to: progression of symptoms, worsening pain and functional status, etc.  This note was generated using Dragon medical dictation software. A reasonable effort had been made for proofreading its contents, but spelling mistakes or grammatical errors may still remain. If there are any questions or points of clarification needed please notify my office.  Consider repeating RIGHT HIP INTRARTICULAR JOINT STEROID INJECTION WITH FLUOROSCOPIC GUIDANCE if pain is severe in the future  Reviewed XR of HIPS and lower back: "IMPRESSION: There is severe right hip osteoarthrosis with joint space narrowing and subchondral sclerosis along with osseous productive change. There is mild to moderate left hip osteoarthrosis. There is degenerative change of the partially imaged lower lumbar spine. No fracture is identified.  RICE PRN Will see rheumatology for pain in multiple joints  Rx for Famotidine PO PRN Stop Sulindac  Start Diclofenac 100mg PO daily x 30 days PRN pain with food #60. Denies CKD, CAD, or gastritis. Recommend that if patient develops GI symptoms including abdominal pain, nausea, or vomiting to discontinue use of medication immediately.  Start Robaxin/Methocarbamol 500mg 1 tablet PO qHS PRN pain, dispense #30. Rx sent. Patient denies any liver problems. Monitor LFTs. Advised not to mix with any alcohol and not to take with any serotonergic medications such as antidepressants. Patient warned about the sedative nature of this medication and recommended not to drive or to handle heavy machinery.  Follow up 4 weeks  Patient was advised if the following symptoms develop: chills, fever, loss of bladder control, bowel incontinence or urinary retention, numbness/tingling or weakness is present in upper or lower extremities, to go to the nearest emergency room. This may be a new clinical condition not present at the time of the patient visit that may lead to paralysis and/or death. Patient advised if the above symptoms developed to also call the office immediately to inform us and to go to the nearest emergency room.

## 2024-09-12 ENCOUNTER — NON-APPOINTMENT (OUTPATIENT)
Age: 59
End: 2024-09-12

## 2024-09-18 ENCOUNTER — APPOINTMENT (OUTPATIENT)
Dept: ENDOCRINOLOGY | Facility: CLINIC | Age: 59
End: 2024-09-18
Payer: MEDICARE

## 2024-09-18 VITALS
HEIGHT: 69 IN | SYSTOLIC BLOOD PRESSURE: 130 MMHG | RESPIRATION RATE: 18 BRPM | TEMPERATURE: 98 F | WEIGHT: 240 LBS | DIASTOLIC BLOOD PRESSURE: 80 MMHG | OXYGEN SATURATION: 98 % | HEART RATE: 92 BPM | BODY MASS INDEX: 35.55 KG/M2

## 2024-09-18 DIAGNOSIS — Z79.4 TYPE 2 DIABETES MELLITUS W/OUT COMPLICATIONS: ICD-10-CM

## 2024-09-18 DIAGNOSIS — E11.9 TYPE 2 DIABETES MELLITUS W/OUT COMPLICATIONS: ICD-10-CM

## 2024-09-18 DIAGNOSIS — E66.9 OBESITY, UNSPECIFIED: ICD-10-CM

## 2024-09-18 LAB
GLUCOSE BLDC GLUCOMTR-MCNC: 225
HBA1C MFR BLD HPLC: 10.1

## 2024-09-18 PROCEDURE — 83036 HEMOGLOBIN GLYCOSYLATED A1C: CPT | Mod: QW

## 2024-09-18 PROCEDURE — 82962 GLUCOSE BLOOD TEST: CPT

## 2024-09-18 PROCEDURE — 99214 OFFICE O/P EST MOD 30 MIN: CPT | Mod: 25

## 2024-09-18 NOTE — HISTORY OF PRESENT ILLNESS
[FreeTextEntry1] :  f/up for Type 2 diabetes mellitus   last A1c: 10.8%   Patient with past medical history as below,     Screening Microalbumin: Cr: on cozaar EGFR: 41 LDL: on statin     Current diabetic medication regimen (verified with patient):  Lantus 22 units  Qhs trulicity 1.5mg Q weekly (reports GI intolerance with dose increase, has omitted doses)  metformin ER 500mg po bid actos 45mg        SMBG ranges (glucometer):  am fasting: avg 170mg/dl  at times 200's

## 2024-10-14 ENCOUNTER — NON-APPOINTMENT (OUTPATIENT)
Age: 59
End: 2024-10-14

## 2024-10-14 ENCOUNTER — APPOINTMENT (OUTPATIENT)
Dept: RHEUMATOLOGY | Facility: CLINIC | Age: 59
End: 2024-10-14
Payer: MEDICARE

## 2024-10-14 VITALS
DIASTOLIC BLOOD PRESSURE: 82 MMHG | BODY MASS INDEX: 35.25 KG/M2 | HEIGHT: 69 IN | SYSTOLIC BLOOD PRESSURE: 150 MMHG | RESPIRATION RATE: 16 BRPM | HEART RATE: 93 BPM | WEIGHT: 238 LBS | OXYGEN SATURATION: 98 %

## 2024-10-14 DIAGNOSIS — M25.552 PAIN IN RIGHT HIP: ICD-10-CM

## 2024-10-14 DIAGNOSIS — F32.A DEPRESSION, UNSPECIFIED: ICD-10-CM

## 2024-10-14 DIAGNOSIS — M25.551 PAIN IN RIGHT HIP: ICD-10-CM

## 2024-10-14 DIAGNOSIS — M17.0 BILATERAL PRIMARY OSTEOARTHRITIS OF KNEE: ICD-10-CM

## 2024-10-14 DIAGNOSIS — M16.0 BILATERAL PRIMARY OSTEOARTHRITIS OF HIP: ICD-10-CM

## 2024-10-14 PROCEDURE — 99204 OFFICE O/P NEW MOD 45 MIN: CPT

## 2024-10-15 PROBLEM — M17.0 PRIMARY OSTEOARTHRITIS OF BOTH KNEES: Status: ACTIVE | Noted: 2024-10-15

## 2024-10-15 PROBLEM — F32.A DEPRESSION: Status: ACTIVE | Noted: 2024-10-15

## 2024-10-15 PROBLEM — M16.0 OSTEOARTHRITIS, HIP, BILATERAL: Status: ACTIVE | Noted: 2024-10-15

## 2024-10-15 LAB
ALBUMIN SERPL ELPH-MCNC: 4.3 G/DL
ALP BLD-CCNC: 112 U/L
ALT SERPL-CCNC: 14 U/L
ANION GAP SERPL CALC-SCNC: 14 MMOL/L
AST SERPL-CCNC: 12 U/L
BILIRUB SERPL-MCNC: 0.2 MG/DL
BUN SERPL-MCNC: 20 MG/DL
CALCIUM SERPL-MCNC: 9.4 MG/DL
CCP AB SER IA-ACNC: <8 U/ML
CHLORIDE SERPL-SCNC: 106 MMOL/L
CO2 SERPL-SCNC: 23 MMOL/L
CREAT SERPL-MCNC: 1.21 MG/DL
CRP SERPL-MCNC: 4 MG/L
EGFR: 69 ML/MIN/1.73M2
ERYTHROCYTE [SEDIMENTATION RATE] IN BLOOD BY WESTERGREN METHOD: 62 MM/HR
HCT VFR BLD CALC: 39 %
HGB BLD-MCNC: 13.3 G/DL
MCHC RBC-ENTMCNC: 30.7 PG
MCHC RBC-ENTMCNC: 34.1 GM/DL
MCV RBC AUTO: 90.1 FL
PLATELET # BLD AUTO: 188 K/UL
POTASSIUM SERPL-SCNC: 3.5 MMOL/L
PROT SERPL-MCNC: 7.1 G/DL
RBC # BLD: 4.33 M/UL
RBC # FLD: 13.4 %
RF+CCP IGG SER-IMP: NEGATIVE
RHEUMATOID FACT SER QL: <10 IU/ML
SODIUM SERPL-SCNC: 143 MMOL/L
WBC # FLD AUTO: 8.89 K/UL

## 2024-10-17 DIAGNOSIS — R70.0 ELEVATED ERYTHROCYTE SEDIMENTATION RATE: ICD-10-CM

## 2024-10-28 ENCOUNTER — APPOINTMENT (OUTPATIENT)
Dept: PHYSICAL MEDICINE AND REHAB | Facility: CLINIC | Age: 59
End: 2024-10-28
Payer: MEDICARE

## 2024-10-28 DIAGNOSIS — G89.29 PAIN IN RIGHT KNEE: ICD-10-CM

## 2024-10-28 DIAGNOSIS — M25.562 PAIN IN RIGHT KNEE: ICD-10-CM

## 2024-10-28 DIAGNOSIS — M25.561 PAIN IN RIGHT KNEE: ICD-10-CM

## 2024-10-28 DIAGNOSIS — M25.552 PAIN IN RIGHT HIP: ICD-10-CM

## 2024-10-28 DIAGNOSIS — M25.551 PAIN IN RIGHT HIP: ICD-10-CM

## 2024-10-28 DIAGNOSIS — E11.9 TYPE 2 DIABETES MELLITUS W/OUT COMPLICATIONS: ICD-10-CM

## 2024-10-28 PROCEDURE — 99213 OFFICE O/P EST LOW 20 MIN: CPT

## 2024-10-28 RX ORDER — METHOCARBAMOL 750 MG/1
750 TABLET, FILM COATED ORAL
Qty: 60 | Refills: 1 | Status: ACTIVE | COMMUNITY
Start: 2024-10-28 | End: 1900-01-01

## 2024-11-21 ENCOUNTER — APPOINTMENT (OUTPATIENT)
Dept: ORTHOPEDIC SURGERY | Facility: CLINIC | Age: 59
End: 2024-11-21

## 2024-11-26 ENCOUNTER — APPOINTMENT (OUTPATIENT)
Dept: PHYSICAL MEDICINE AND REHAB | Facility: CLINIC | Age: 59
End: 2024-11-26
Payer: MEDICARE

## 2024-11-26 DIAGNOSIS — G89.29 PAIN IN RIGHT KNEE: ICD-10-CM

## 2024-11-26 DIAGNOSIS — M25.551 PAIN IN RIGHT HIP: ICD-10-CM

## 2024-11-26 DIAGNOSIS — M25.552 PAIN IN RIGHT HIP: ICD-10-CM

## 2024-11-26 DIAGNOSIS — M25.562 PAIN IN RIGHT KNEE: ICD-10-CM

## 2024-11-26 DIAGNOSIS — M25.561 PAIN IN RIGHT KNEE: ICD-10-CM

## 2024-11-26 PROCEDURE — 99213 OFFICE O/P EST LOW 20 MIN: CPT

## 2024-12-02 ENCOUNTER — APPOINTMENT (OUTPATIENT)
Dept: ORTHOPEDIC SURGERY | Facility: CLINIC | Age: 59
End: 2024-12-02
Payer: MEDICARE

## 2024-12-02 PROCEDURE — 99213 OFFICE O/P EST LOW 20 MIN: CPT

## 2024-12-11 ENCOUNTER — APPOINTMENT (OUTPATIENT)
Dept: PHYSICAL MEDICINE AND REHAB | Facility: CLINIC | Age: 59
End: 2024-12-11
Payer: MEDICARE

## 2024-12-11 DIAGNOSIS — M25.561 PAIN IN RIGHT KNEE: ICD-10-CM

## 2024-12-11 DIAGNOSIS — M25.551 PAIN IN RIGHT HIP: ICD-10-CM

## 2024-12-11 DIAGNOSIS — M25.562 PAIN IN RIGHT KNEE: ICD-10-CM

## 2024-12-11 DIAGNOSIS — G89.29 PAIN IN RIGHT KNEE: ICD-10-CM

## 2024-12-11 DIAGNOSIS — G89.29 PAIN IN RIGHT HIP: ICD-10-CM

## 2024-12-11 PROCEDURE — 99213 OFFICE O/P EST LOW 20 MIN: CPT | Mod: 95

## 2024-12-17 ENCOUNTER — NON-APPOINTMENT (OUTPATIENT)
Age: 59
End: 2024-12-17

## 2024-12-19 ENCOUNTER — APPOINTMENT (OUTPATIENT)
Dept: ORTHOPEDIC SURGERY | Facility: CLINIC | Age: 59
End: 2024-12-19

## 2024-12-20 ENCOUNTER — APPOINTMENT (OUTPATIENT)
Dept: ORTHOPEDIC SURGERY | Facility: CLINIC | Age: 59
End: 2024-12-20
Payer: MEDICARE

## 2024-12-20 DIAGNOSIS — M17.11 UNILATERAL PRIMARY OSTEOARTHRITIS, RIGHT KNEE: ICD-10-CM

## 2024-12-20 DIAGNOSIS — M16.11 UNILATERAL PRIMARY OSTEOARTHRITIS, RIGHT HIP: ICD-10-CM

## 2024-12-20 PROCEDURE — 99204 OFFICE O/P NEW MOD 45 MIN: CPT | Mod: 25

## 2024-12-20 PROCEDURE — 20611 DRAIN/INJ JOINT/BURSA W/US: CPT | Mod: RT

## 2024-12-30 ENCOUNTER — APPOINTMENT (OUTPATIENT)
Dept: ENDOCRINOLOGY | Facility: CLINIC | Age: 59
End: 2024-12-30
Payer: MEDICARE

## 2024-12-30 VITALS
OXYGEN SATURATION: 98 % | TEMPERATURE: 97.8 F | DIASTOLIC BLOOD PRESSURE: 57 MMHG | RESPIRATION RATE: 18 BRPM | HEIGHT: 69 IN | SYSTOLIC BLOOD PRESSURE: 151 MMHG | HEART RATE: 76 BPM | WEIGHT: 243 LBS | BODY MASS INDEX: 35.99 KG/M2

## 2024-12-30 DIAGNOSIS — E66.812 OBESITY, CLASS 2: ICD-10-CM

## 2024-12-30 DIAGNOSIS — Z01.818 ENCOUNTER FOR OTHER PREPROCEDURAL EXAMINATION: ICD-10-CM

## 2024-12-30 DIAGNOSIS — E11.9 TYPE 2 DIABETES MELLITUS W/OUT COMPLICATIONS: ICD-10-CM

## 2024-12-30 DIAGNOSIS — Z79.4 TYPE 2 DIABETES MELLITUS W/OUT COMPLICATIONS: ICD-10-CM

## 2024-12-30 LAB
GLUCOSE BLDC GLUCOMTR-MCNC: 88
HBA1C MFR BLD HPLC: 6.2

## 2024-12-30 PROCEDURE — 82962 GLUCOSE BLOOD TEST: CPT

## 2024-12-30 PROCEDURE — 99214 OFFICE O/P EST MOD 30 MIN: CPT | Mod: 25

## 2024-12-30 PROCEDURE — 83036 HEMOGLOBIN GLYCOSYLATED A1C: CPT | Mod: QW

## 2024-12-30 RX ORDER — DULAGLUTIDE 0.75 MG/.5ML
0.75 INJECTION, SOLUTION SUBCUTANEOUS
Qty: 1 | Refills: 2 | Status: ACTIVE | COMMUNITY
Start: 2024-12-30 | End: 1900-01-01

## 2025-01-03 ENCOUNTER — OUTPATIENT (OUTPATIENT)
Dept: OUTPATIENT SERVICES | Facility: HOSPITAL | Age: 60
LOS: 1 days | End: 2025-01-03
Payer: COMMERCIAL

## 2025-01-03 ENCOUNTER — APPOINTMENT (OUTPATIENT)
Dept: PHYSICAL MEDICINE AND REHAB | Facility: CLINIC | Age: 60
End: 2025-01-03
Payer: MEDICARE

## 2025-01-03 DIAGNOSIS — G89.29 PAIN IN RIGHT HIP: ICD-10-CM

## 2025-01-03 DIAGNOSIS — N28.89 OTHER SPECIFIED DISORDERS OF KIDNEY AND URETER: Chronic | ICD-10-CM

## 2025-01-03 DIAGNOSIS — Z98.890 OTHER SPECIFIED POSTPROCEDURAL STATES: Chronic | ICD-10-CM

## 2025-01-03 DIAGNOSIS — M25.551 PAIN IN RIGHT HIP: ICD-10-CM

## 2025-01-03 PROCEDURE — 77002 NEEDLE LOCALIZATION BY XRAY: CPT

## 2025-01-03 PROCEDURE — 20610 DRAIN/INJ JOINT/BURSA W/O US: CPT

## 2025-01-03 PROCEDURE — 77002 NEEDLE LOCALIZATION BY XRAY: CPT | Mod: 26

## 2025-01-03 PROCEDURE — 20610 DRAIN/INJ JOINT/BURSA W/O US: CPT | Mod: RT

## 2025-01-16 ENCOUNTER — APPOINTMENT (OUTPATIENT)
Dept: ORTHOPEDIC SURGERY | Facility: CLINIC | Age: 60
End: 2025-01-16

## 2025-01-16 DIAGNOSIS — M16.11 UNILATERAL PRIMARY OSTEOARTHRITIS, RIGHT HIP: ICD-10-CM

## 2025-01-16 DIAGNOSIS — M23.91 UNSPECIFIED INTERNAL DERANGEMENT OF RIGHT KNEE: ICD-10-CM

## 2025-01-16 DIAGNOSIS — Z98.890 OTHER SPECIFIED POSTPROCEDURAL STATES: ICD-10-CM

## 2025-01-16 DIAGNOSIS — M23.92 UNSPECIFIED INTERNAL DERANGEMENT OF LEFT KNEE: ICD-10-CM

## 2025-01-16 PROCEDURE — 99215 OFFICE O/P EST HI 40 MIN: CPT

## 2025-01-26 PROBLEM — M23.92 INTERNAL DERANGEMENT OF LEFT KNEE: Status: RESOLVED | Noted: 2021-12-17 | Resolved: 2025-01-26

## 2025-01-26 PROBLEM — M23.91 INTERNAL DERANGEMENT OF KNEE, RIGHT: Status: RESOLVED | Noted: 2022-10-14 | Resolved: 2025-01-26

## 2025-01-26 PROBLEM — Z98.890 HISTORY OF ARTHROSCOPY OF RIGHT KNEE: Status: RESOLVED | Noted: 2021-09-01 | Resolved: 2025-01-26

## 2025-01-27 NOTE — ED PROVIDER NOTE - MEDICAL DECISION MAKING DETAILS
pt p/w left sided lfank pain, with clinical picture of renal colic, will r/o severe hydro, infected stone. neg hydro. most likely +pyelo. urine cultures pending.
Patient currently domiciled to home with wife and daughter. 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 Family notes patient has not left the house since his mentation started declining further.

## 2025-01-29 ENCOUNTER — APPOINTMENT (OUTPATIENT)
Dept: PHYSICAL MEDICINE AND REHAB | Facility: CLINIC | Age: 60
End: 2025-01-29
Payer: MEDICARE

## 2025-01-29 DIAGNOSIS — G89.29 PAIN IN RIGHT KNEE: ICD-10-CM

## 2025-01-29 DIAGNOSIS — M25.562 PAIN IN RIGHT KNEE: ICD-10-CM

## 2025-01-29 DIAGNOSIS — M25.551 PAIN IN RIGHT HIP: ICD-10-CM

## 2025-01-29 DIAGNOSIS — M25.561 PAIN IN RIGHT KNEE: ICD-10-CM

## 2025-01-29 DIAGNOSIS — G89.29 PAIN IN RIGHT HIP: ICD-10-CM

## 2025-01-29 PROCEDURE — 99213 OFFICE O/P EST LOW 20 MIN: CPT | Mod: 2W

## 2025-02-03 NOTE — ED ADULT NURSE NOTE - ED STAT RN HANDOFF DETAILS
Fellow
pt stable, Alert and oriented times three, awaiting for Ct results report given to nurse Roberta

## 2025-02-05 ENCOUNTER — NON-APPOINTMENT (OUTPATIENT)
Age: 60
End: 2025-02-05

## (undated) DEVICE — WARMING BLANKET UPPER ADULT

## (undated) DEVICE — VENODYNE/SCD SLEEVE CALF MEDIUM